# Patient Record
Sex: FEMALE | Race: WHITE | NOT HISPANIC OR LATINO | Employment: OTHER | URBAN - METROPOLITAN AREA
[De-identification: names, ages, dates, MRNs, and addresses within clinical notes are randomized per-mention and may not be internally consistent; named-entity substitution may affect disease eponyms.]

---

## 2017-01-04 ENCOUNTER — OUTSIDE FACILITY SERVICE (OUTPATIENT)
Dept: PAIN MEDICINE | Facility: CLINIC | Age: 47
End: 2017-01-04

## 2017-01-04 PROCEDURE — 64450 NJX AA&/STRD OTHER PN/BRANCH: CPT | Performed by: PAIN MEDICINE

## 2017-01-05 RX ORDER — TIZANIDINE 4 MG/1
TABLET ORAL
Qty: 270 TABLET | Refills: 0 | Status: SHIPPED | OUTPATIENT
Start: 2017-01-05 | End: 2017-02-10 | Stop reason: SDUPTHER

## 2017-01-07 ENCOUNTER — APPOINTMENT (OUTPATIENT)
Dept: GENERAL RADIOLOGY | Facility: HOSPITAL | Age: 47
End: 2017-01-07

## 2017-01-07 ENCOUNTER — HOSPITAL ENCOUNTER (INPATIENT)
Facility: HOSPITAL | Age: 47
LOS: 4 days | Discharge: HOME | End: 2017-01-11
Attending: FAMILY MEDICINE | Admitting: INTERNAL MEDICINE

## 2017-01-07 ENCOUNTER — APPOINTMENT (OUTPATIENT)
Dept: CT IMAGING | Facility: HOSPITAL | Age: 47
End: 2017-01-07

## 2017-01-07 DIAGNOSIS — I26.99 OTHER ACUTE PULMONARY EMBOLISM WITHOUT ACUTE COR PULMONALE (HCC): Primary | ICD-10-CM

## 2017-01-07 DIAGNOSIS — D50.9 IRON DEFICIENCY ANEMIA, UNSPECIFIED IRON DEFICIENCY ANEMIA TYPE: ICD-10-CM

## 2017-01-07 DIAGNOSIS — J18.9 PNEUMONIA OF BOTH LUNGS DUE TO INFECTIOUS ORGANISM, UNSPECIFIED PART OF LUNG: ICD-10-CM

## 2017-01-07 LAB
ALBUMIN SERPL-MCNC: 3.6 G/DL (ref 3.5–5.2)
ALBUMIN/GLOB SERPL: 1 G/DL
ALP SERPL-CCNC: 139 U/L (ref 39–117)
ALT SERPL W P-5'-P-CCNC: 26 U/L (ref 1–33)
ANION GAP SERPL CALCULATED.3IONS-SCNC: 15.7 MMOL/L
AST SERPL-CCNC: 21 U/L (ref 1–32)
BASOPHILS # BLD AUTO: 0.03 10*3/MM3 (ref 0–0.2)
BASOPHILS NFR BLD AUTO: 0.4 % (ref 0–1.5)
BILIRUB SERPL-MCNC: 0.3 MG/DL (ref 0.1–1.2)
BUN BLD-MCNC: 8 MG/DL (ref 6–20)
BUN/CREAT SERPL: 11 (ref 7–25)
CALCIUM SPEC-SCNC: 9.1 MG/DL (ref 8.6–10.5)
CHLORIDE SERPL-SCNC: 99 MMOL/L (ref 98–107)
CO2 SERPL-SCNC: 25.3 MMOL/L (ref 22–29)
CREAT BLD-MCNC: 0.73 MG/DL (ref 0.57–1)
D DIMER PPP FEU-MCNC: 1.4 MCGFEU/ML (ref 0–0.49)
DEPRECATED RDW RBC AUTO: 49 FL (ref 37–54)
EOSINOPHIL # BLD AUTO: 0.19 10*3/MM3 (ref 0–0.7)
EOSINOPHIL NFR BLD AUTO: 2.5 % (ref 0.3–6.2)
ERYTHROCYTE [DISTWIDTH] IN BLOOD BY AUTOMATED COUNT: 15.3 % (ref 11.7–13)
GFR SERPL CREATININE-BSD FRML MDRD: 85 ML/MIN/1.73
GLOBULIN UR ELPH-MCNC: 3.7 GM/DL
GLUCOSE BLD-MCNC: 102 MG/DL (ref 65–99)
HCG SERPL QL: NEGATIVE
HCT VFR BLD AUTO: 38.3 % (ref 35.6–45.5)
HGB BLD-MCNC: 12.6 G/DL (ref 11.9–15.5)
HOLD SPECIMEN: NORMAL
IMM GRANULOCYTES # BLD: 0 10*3/MM3 (ref 0–0.03)
IMM GRANULOCYTES NFR BLD: 0 % (ref 0–0.5)
LYMPHOCYTES # BLD AUTO: 1.51 10*3/MM3 (ref 0.9–4.8)
LYMPHOCYTES NFR BLD AUTO: 19.6 % (ref 19.6–45.3)
MCH RBC QN AUTO: 29.2 PG (ref 26.9–32)
MCHC RBC AUTO-ENTMCNC: 32.9 G/DL (ref 32.4–36.3)
MCV RBC AUTO: 88.9 FL (ref 80.5–98.2)
MONOCYTES # BLD AUTO: 0.34 10*3/MM3 (ref 0.2–1.2)
MONOCYTES NFR BLD AUTO: 4.4 % (ref 5–12)
NEUTROPHILS # BLD AUTO: 5.62 10*3/MM3 (ref 1.9–8.1)
NEUTROPHILS NFR BLD AUTO: 73.1 % (ref 42.7–76)
NT-PROBNP SERPL-MCNC: 931.2 PG/ML (ref 5–450)
PLATELET # BLD AUTO: 298 10*3/MM3 (ref 140–500)
PMV BLD AUTO: 9.6 FL (ref 6–12)
POTASSIUM BLD-SCNC: 4.1 MMOL/L (ref 3.5–5.2)
PROT SERPL-MCNC: 7.3 G/DL (ref 6–8.5)
RBC # BLD AUTO: 4.31 10*6/MM3 (ref 3.9–5.2)
SODIUM BLD-SCNC: 140 MMOL/L (ref 136–145)
TROPONIN T SERPL-MCNC: <0.01 NG/ML (ref 0–0.03)
WBC NRBC COR # BLD: 7.69 10*3/MM3 (ref 4.5–10.7)
WHOLE BLOOD HOLD SPECIMEN: NORMAL
WHOLE BLOOD HOLD SPECIMEN: NORMAL

## 2017-01-07 PROCEDURE — 83880 ASSAY OF NATRIURETIC PEPTIDE: CPT | Performed by: FAMILY MEDICINE

## 2017-01-07 PROCEDURE — 71275 CT ANGIOGRAPHY CHEST: CPT

## 2017-01-07 PROCEDURE — 93005 ELECTROCARDIOGRAM TRACING: CPT | Performed by: FAMILY MEDICINE

## 2017-01-07 PROCEDURE — 25010000002 LORAZEPAM PER 2 MG: Performed by: FAMILY MEDICINE

## 2017-01-07 PROCEDURE — 0 IOPAMIDOL PER 1 ML: Performed by: FAMILY MEDICINE

## 2017-01-07 PROCEDURE — 85379 FIBRIN DEGRADATION QUANT: CPT | Performed by: FAMILY MEDICINE

## 2017-01-07 PROCEDURE — 71020 HC CHEST PA AND LATERAL: CPT

## 2017-01-07 PROCEDURE — 80053 COMPREHEN METABOLIC PANEL: CPT | Performed by: FAMILY MEDICINE

## 2017-01-07 PROCEDURE — 25010000002 PROMETHAZINE PER 50 MG: Performed by: FAMILY MEDICINE

## 2017-01-07 PROCEDURE — 99285 EMERGENCY DEPT VISIT HI MDM: CPT

## 2017-01-07 PROCEDURE — 25010000002 HYDROMORPHONE PER 4 MG

## 2017-01-07 PROCEDURE — 85025 COMPLETE CBC W/AUTO DIFF WBC: CPT | Performed by: FAMILY MEDICINE

## 2017-01-07 PROCEDURE — 25010000002 ENOXAPARIN PER 10 MG: Performed by: FAMILY MEDICINE

## 2017-01-07 PROCEDURE — 93010 ELECTROCARDIOGRAM REPORT: CPT | Performed by: INTERNAL MEDICINE

## 2017-01-07 PROCEDURE — 25010000002 ONDANSETRON PER 1 MG

## 2017-01-07 PROCEDURE — 84484 ASSAY OF TROPONIN QUANT: CPT | Performed by: FAMILY MEDICINE

## 2017-01-07 PROCEDURE — 84703 CHORIONIC GONADOTROPIN ASSAY: CPT | Performed by: FAMILY MEDICINE

## 2017-01-07 RX ORDER — PROMETHAZINE HYDROCHLORIDE 25 MG/ML
12.5 INJECTION, SOLUTION INTRAMUSCULAR; INTRAVENOUS ONCE
Status: COMPLETED | OUTPATIENT
Start: 2017-01-07 | End: 2017-01-07

## 2017-01-07 RX ORDER — HYDROMORPHONE HYDROCHLORIDE 1 MG/ML
0.5 INJECTION, SOLUTION INTRAMUSCULAR; INTRAVENOUS; SUBCUTANEOUS ONCE
Status: COMPLETED | OUTPATIENT
Start: 2017-01-07 | End: 2017-01-07

## 2017-01-07 RX ORDER — ONDANSETRON 2 MG/ML
4 INJECTION INTRAMUSCULAR; INTRAVENOUS ONCE
Status: COMPLETED | OUTPATIENT
Start: 2017-01-07 | End: 2017-01-07

## 2017-01-07 RX ORDER — PROMETHAZINE HYDROCHLORIDE 25 MG/ML
6.25 INJECTION, SOLUTION INTRAMUSCULAR; INTRAVENOUS ONCE
Status: COMPLETED | OUTPATIENT
Start: 2017-01-07 | End: 2017-01-07

## 2017-01-07 RX ORDER — ONDANSETRON 2 MG/ML
INJECTION INTRAMUSCULAR; INTRAVENOUS
Status: COMPLETED
Start: 2017-01-07 | End: 2017-01-07

## 2017-01-07 RX ORDER — LORAZEPAM 2 MG/ML
1 INJECTION INTRAMUSCULAR ONCE
Status: COMPLETED | OUTPATIENT
Start: 2017-01-07 | End: 2017-01-07

## 2017-01-07 RX ORDER — SODIUM CHLORIDE 0.9 % (FLUSH) 0.9 %
10 SYRINGE (ML) INJECTION AS NEEDED
Status: DISCONTINUED | OUTPATIENT
Start: 2017-01-07 | End: 2017-01-11 | Stop reason: HOSPADM

## 2017-01-07 RX ORDER — PROMETHAZINE HYDROCHLORIDE 25 MG/ML
INJECTION, SOLUTION INTRAMUSCULAR; INTRAVENOUS
Status: DISPENSED
Start: 2017-01-07 | End: 2017-01-08

## 2017-01-07 RX ORDER — ALPRAZOLAM 0.25 MG/1
1 TABLET ORAL ONCE
Status: COMPLETED | OUTPATIENT
Start: 2017-01-07 | End: 2017-01-07

## 2017-01-07 RX ORDER — LORAZEPAM 2 MG/ML
1 INJECTION INTRAMUSCULAR ONCE
Status: DISCONTINUED | OUTPATIENT
Start: 2017-01-07 | End: 2017-01-07

## 2017-01-07 RX ADMIN — IOPAMIDOL 95 ML: 755 INJECTION, SOLUTION INTRAVENOUS at 20:54

## 2017-01-07 RX ADMIN — HYDROMORPHONE HYDROCHLORIDE 0.5 MG: 1 INJECTION, SOLUTION INTRAMUSCULAR; INTRAVENOUS; SUBCUTANEOUS at 23:53

## 2017-01-07 RX ADMIN — LORAZEPAM 1 MG: 2 INJECTION INTRAMUSCULAR; INTRAVENOUS at 19:49

## 2017-01-07 RX ADMIN — ALPRAZOLAM 1 MG: 0.25 TABLET ORAL at 17:53

## 2017-01-07 RX ADMIN — ONDANSETRON 4 MG: 2 INJECTION INTRAMUSCULAR; INTRAVENOUS at 23:53

## 2017-01-07 RX ADMIN — PROMETHAZINE HYDROCHLORIDE 12.5 MG: 25 INJECTION INTRAMUSCULAR; INTRAVENOUS at 18:00

## 2017-01-07 RX ADMIN — ENOXAPARIN SODIUM 160 MG: 80 INJECTION SUBCUTANEOUS at 23:55

## 2017-01-07 RX ADMIN — PROMETHAZINE HYDROCHLORIDE 6.25 MG: 25 INJECTION INTRAMUSCULAR; INTRAVENOUS at 20:24

## 2017-01-07 NOTE — IP AVS SNAPSHOT
AFTER VISIT SUMMARY             Radha Retana           About your hospitalization     You were admitted on:  January 7, 2017 You last received care in the:  [address]       Procedures & Surgeries         Medications    If you or your caregiver advised us that you are currently taking a medication and that medication is marked below as “Resume”, this simply indicates that we have reviewed those medications to make sure our new therapy recommendations do not interfere.  If you have concerns about medications other than those new ones which we are prescribing today, please consult the physician who prescribed them (or your primary physician).  Our review of your home medications is not meant to indicate that we are directing their use.             Your Medications      START taking these medications     albuterol 108 (90 BASE) MCG/ACT inhaler   Inhale 2 puffs Every 4 (Four) Hours As Needed for wheezing.   Commonly known as:  PROVENTIL HFA;VENTOLIN HFA           budesonide-formoterol 160-4.5 MCG/ACT inhaler   Inhale 2 puffs 2 (Two) Times a Day.   Last time this was given:  1/10/2017  7:59 PM   Commonly known as:  SYMBICORT           cyanocobalamin 500 MCG tablet   Take 1 tablet by mouth Daily.   Last time this was given:  1/11/2017 10:03 AM   Commonly known as:  VITAMIN B-12           dabigatran etexilate 150 MG capsu   Take 1 capsule by mouth Every 12 (Twelve) Hours.   Last time this was given:  1/11/2017 10:03 AM   Commonly known as:  PRADAXA            MG capsule   Take 100 mg by mouth 2 (Two) Times a Day.           oxyCODONE-acetaminophen 7.5-325 MG per tablet   Take 1 tablet by mouth Every 8 (Eight) Hours As Needed for moderate pain (4-6) or severe pain (7-10) for up to 8 days.   Last time this was given:  1/11/2017  5:55 AM   Commonly known as:  PERCOCET   Replaces:  oxyCODONE-acetaminophen 5-325 MG per tablet             CONTINUE taking these medications     amphetamine-dextroamphetamine 20 MG tablet   Commonly known as:  ADDERALL           cabergoline 0.5 MG tablet   Take 0.25 mg by mouth 2 (two) times a week.   Commonly known as:  DOSTINEX           clonazePAM 2 MG tablet   Take 2 mg by mouth 2 (Two) Times a Day As Needed.   Last time this was given:  1/11/2017 10:03 AM   Commonly known as:  KlonoPIN           gabapentin 600 MG tablet   TAKE 1 TABLET BY MOUTH THREE TIMES DAILY.   Last time this was given:  1/11/2017  5:55 AM   Commonly known as:  NEURONTIN           lisdexamfetamine 70 MG capsule   Take 70 mg by mouth every morning.   Commonly known as:  VYVANSE           tiZANidine 4 MG tablet   TAKE 1 TABLET BY MOUTH THREE TIMES DAILY   Last time this was given:  1/11/2017  5:55 AM   Commonly known as:  ZANAFLEX           vilazodone 40 MG tablet tablet   Take by mouth daily.   Last time this was given:  1/11/2017 10:04 AM   Commonly known as:  VIIBRYD           ziprasidone 60 MG capsule   60 mg 2 (Two) Times a Day.   Last time this was given:  1/10/2017  9:09 PM   Commonly known as:  GEODON             STOP taking these medications     ibuprofen 200 MG tablet   Commonly known as:  ADVIL,MOTRIN           naproxen sodium 220 MG tablet   Commonly known as:  ALEVE           oxyCODONE-acetaminophen 5-325 MG per tablet   Commonly known as:  PERCOCET   Replaced by:  oxyCODONE-acetaminophen 7.5-325 MG per tablet           VICTOZA 18 MG/3ML solution pen-injector   Generic drug:  Liraglutide                Where to Get Your Medications      These medications were sent to [facility] [id number] [place] [address] - [address] [facility] - [phone] [facility] [phone]   [address] [facility], [facility] [address]     Phone:  [phone]     albuterol 108 (90 BASE) MCG/ACT inhaler    budesonide-formoterol 160-4.5 MCG/ACT inhaler    cyanocobalamin 500 MCG tablet    dabigatran etexilate 150 MG capsu     MG capsule         You can get  these medications from any pharmacy     Bring a paper prescription for each of these medications     oxyCODONE-acetaminophen 7.5-325 MG per tablet                  Your Medications      Your Medication List           Morning Noon Evening Bedtime As Needed    albuterol 108 (90 BASE) MCG/ACT inhaler   Inhale 2 puffs Every 4 (Four) Hours As Needed for wheezing.   Commonly known as:  PROVENTIL HFA;VENTOLIN HFA                            Take every 4 hours as needed for shortness of air / wheezing       amphetamine-dextroamphetamine 20 MG tablet   Commonly known as:  ADDERALL                                   budesonide-formoterol 160-4.5 MCG/ACT inhaler   Inhale 2 puffs 2 (Two) Times a Day.   Commonly known as:  SYMBICORT                                      cabergoline 0.5 MG tablet   Take 0.25 mg by mouth 2 (two) times a week.   Commonly known as:  DOSTINEX                                   clonazePAM 2 MG tablet   Take 2 mg by mouth 2 (Two) Times a Day As Needed.   Commonly known as:  KlonoPIN                            Take twice a day as needed for anxiety       cyanocobalamin 500 MCG tablet   Take 1 tablet by mouth Daily.   Commonly known as:  VITAMIN B-12                                   dabigatran etexilate 150 MG capsu   Take 1 capsule by mouth Every 12 (Twelve) Hours.   Commonly known as:  PRADAXA                                       MG capsule   Take 100 mg by mouth 2 (Two) Times a Day.                                      gabapentin 600 MG tablet   TAKE 1 TABLET BY MOUTH THREE TIMES DAILY.   Commonly known as:  NEURONTIN                                         lisdexamfetamine 70 MG capsule   Take 70 mg by mouth every morning.   Commonly known as:  VYVANSE                                   oxyCODONE-acetaminophen 7.5-325 MG per tablet   Take 1 tablet by mouth Every 8 (Eight) Hours As Needed for moderate pain (4-6) or severe pain (7-10) for up to 8 days.   Commonly known as:  PERCOCET                             Take every 8 hours as needed for pain         tiZANidine 4 MG tablet   TAKE 1 TABLET BY MOUTH THREE TIMES DAILY   Commonly known as:  ZANAFLEX                                         vilazodone 40 MG tablet tablet   Take by mouth daily.   Commonly known as:  VIIBRYD                                   ziprasidone 60 MG capsule   60 mg 2 (Two) Times a Day.   Commonly known as:  GEODON                                               Instructions for After Discharge          Discharge Instructions       • Dr. Mccloud: See him in the office in 2-3 weeks to review her laboratory parameters and make any recommendations. In the future when she returns to the office, I am planning as well to proceed with a repeat CT scan of the chest to be sure that the clots are very much resolved.    Discharge References/Attachments     PULMONARY EMBOLISM (ENGLISH)    ALBUTEROL INHALATION SOLUTION (ENGLISH)    BUDESONIDE; FORMOTEROL INHALATION (ENGLISH)    VITAMIN B12 ORAL (ENGLISH)    DABIGATRAN ORAL CAPSULES (ENGLISH)    ACETAMINOPHEN; OXYCODONE TABLETS (ENGLISH)       Follow-ups for After Discharge        Follow-up Information     Follow up with Dilma Landeros MD. Schedule an appointment as soon as possible for a visit in 1 week(s).    Specialty:  Family Medicine    Why:  made appt for 1/19/17 at 2:15pm    Contact information:    [email or web address]  [address]  [place] [id number] [phone]          Follow up with Harlan Mccloud MD. Go on 1/31/2017.    Specialties:  Hematology and Oncology, Oncology, Hematology    Why:  1/31 @ 230  Office in 2-3 wks to review her laboratory parameters & make any recommendations.      In the future when she returns to the office, I am planning as well to proceed with a repeat CT scan     Contact information:    [address]  [address]  [place] [id number] [phone]          Follow up with Claudette Larson MD. Schedule an appointment as soon as possible for a visit in 2 week(s).     Specialties:  Pulmonary Disease, Sleep Medicine    Why:  Make an appointment to have home sleep study arranged.    Contact information:    [address]  [facility] [address]  [facility] [id number] [phone]        Scheduled Appointments     Jan 19, 2017  2:30 PM EST   Office Visit with Dilma Landeros MD   [facility] (--)    [address]  [facility] [address] [phone]           Arrive 15 minutes prior to appointment.            Jan 31, 2017  2:30 PM EST   Lab with LAB CHAIR 2 [facility] ONC LAB (--)    [address] [facility]  [address]  [facility] [id number] [phone]            Jan 31, 2017  3:00 PM EST   HOSITAL FOLLOW UP with Harlan Mccloud MD   [facility]ORDERS [facility]CER ([facility])    [address] [facility] [address]  [facility] [address] [phone]              MyChart Signup     Our records indicate that you have declined [facility] MyChart signup. If you would like to sign up for [facility], please email Server [email or web address] or call [phone] to obtain an activation code.         Summary of Your Hospitalization        Reason for Hospitalization     Your primary diagnosis was:  Pulmonary Embolism Without Acute Cor Pulmonale    Your diagnoses also included:  Anxiety Problem, Depression, Severe Obesity, Pituitary Adenoma, Vitamin B12 Deficiency, Iron Deficiency Anemia      Care Providers     Provider Service Role Specialty    Devendra Lucero MD -- Attending Provider Internal Medicine    Kyle Meyers II, MD -- Consulting Physician  Hematology and Oncology    Deonte Rivera MD -- Consulting Physician  Pulmonary Disease    Bekah Valdovinos MD PhD -- Consulting Physician  Hematology and Oncology       Your Allergies  Date Reviewed: 1/8/2017    Allergen Reactions    Adhesive Tape Not Noted         Penicillins Not Noted         Sulfa Antibiotics Not  Noted         Morphine Hives  Rash      Pending Labs     Order Current Status    Beta-2 Glycoprotein Antibodies In process    Factor 5 Leiden In process    Factor II, DNA Analysis In process    Lupus Anticoagulant In process    Blood Culture Preliminary result    Blood Culture Preliminary result      Patient Belongings Returned     Document Return of Belongings Flowsheet     Were the patient bedside belongings sent home?   Yes   Belongings Retrieved from Security & Sent Home   N/A    Belongings Sent to Safe   --   Medications Retrieved from Pharmacy & Sent Home   N/A              More Information      Pulmonary Embolism  A pulmonary embolism (PE) is a sudden blockage or decrease of blood flow in one lung or both lungs. Most blockages come from a blood clot that travels from the legs or the pelvis to the lungs. PE is a dangerous and potentially life-threatening condition if it is not treated right away.  CAUSES  A pulmonary embolism occurs most commonly when a blood clot travels from one of your veins to your lungs. Rarely, PE is caused by air, fat, amniotic fluid, or part of a tumor traveling through your veins to your lungs.  RISK FACTORS  A PE is more likely to develop in:  · People who smoke.  · People who are older, especially over 60 years of age.  · People who are overweight (obese).  · People who sit or lie still for a long time, such as during long-distance travel (over 4 hours), bed rest, hospitalization, or during recovery from certain medical conditions like a stroke.  · People who do not engage in much physical activity (sedentary lifestyle).  · People who have chronic breathing disorders.  · People who have a personal or family history of blood clots or blood clotting disease.  · People who have peripheral vascular disease (PVD), diabetes, or some types of cancer.  · People who have heart disease, especially if the person had a recent heart attack or has congestive heart failure.  · People who have  neurological diseases that affect the legs (leg paresis).  · People who have had a traumatic injury, such as breaking a hip or leg.  · People who have recently had major or lengthy surgery, especially on the hip, knee, or abdomen.  · People who have had a central line placed inside a large vein.  · People who take medicines that contain the hormone estrogen. These include birth control pills and hormone replacement therapy.  · Pregnancy or during childbirth or the postpartum period.  SIGNS AND SYMPTOMS   The symptoms of a PE usually start suddenly and include:  · Shortness of breath while active or at rest.  · Coughing or coughing up blood or blood-tinged mucus.  · Chest pain that is often worse with deep breaths.  · Rapid or irregular heartbeat.  · Feeling light-headed or dizzy.  · Fainting.  · Feeling anxious.  · Sweating.  There may also be pain and swelling in a leg if that is where the blood clot started.  These symptoms may represent a serious problem that is an emergency. Do not wait to see if the symptoms will go away. Get medical help right away. Call your local emergency services (911 in the [place] Do not drive yourself to the hospital.  DIAGNOSIS  Your health care provider will take a medical history and perform a physical exam. You may also have other tests, including:  · Blood tests to assess the clotting properties of your blood, assess oxygen levels in your blood, and find blood clots.  · Imaging tests, such as CT, ultrasound, MRI, X-ray, and other tests to see if you have clots anywhere in your body.  · An electrocardiogram (ECG) to look for heart strain from blood clots in the lungs.  TREATMENT  The main goals of PE treatment are:  · To stop a blood clot from growing larger.  · To stop new blood clots from forming.  The type of treatment that you receive depends on many factors, such as the cause of your PE, your risk for bleeding or developing more clots, and other medical conditions that you have.  Sometimes, a combination of treatments is necessary.  This condition may be treated with:  · Medicines, including newer oral blood thinners (anticoagulants), warfarin, low molecular weight heparins, thrombolytics, or heparins.  · Wearing compression stockings or using different types of devices.  · Surgery (rare) to remove the blood clot or to place a filter in your abdomen to stop the blood clot from traveling to your lungs.  Treatments for a PE are often divided into immediate treatment, long-term treatment (up to 3 months after PE), and extended treatment (more than 3 months after PE). Your treatment may continue for several months. This is called maintenance therapy, and it is used to prevent the forming of new blood clots. You can work with your health care provider to choose the treatment program that is best for you.  What are anticoagulants?   Anticoagulants are medicines that treat PEs. They can stop current blood clots from growing and stop new clots from forming. They cannot dissolve existing clots. Your body dissolves clots by itself over time. Anticoagulants are given by mouth, by injection, or through an IV tube.  What are thrombolytics?   Thrombolytics are clot-dissolving medicines that are used to dissolve a PE. They carry a high risk of bleeding, so they tend to be used only in severe cases or if you have very low blood pressure.  HOME CARE INSTRUCTIONS  If you are taking a newer oral anticoagulant:   · Take the medicine every single day at the same time each day.  · Understand what foods and drugs interact with this medicine.  · Understand that there are no regular blood tests required when using this medicine.  · Understand the side effects of this medicine, including excessive bruising or bleeding. Ask your health care provider or pharmacist about other possible side effects.  If you are taking warfarin:  · Understand how to take warfarin and know which foods can affect how warfarin works in your  body.  · Understand that it is dangerous to take too much or too little warfarin. Too much warfarin increases the risk of bleeding. Too little warfarin continues to allow the risk for blood clots.  · Follow your PT and INR blood testing schedule. The PT and INR results allow your health care provider to adjust your dose of warfarin. It is very important that you have your PT and INR tested as often as told by your health care provider.  · Avoid major changes in your diet, or tell your health care provider before you change your diet. Arrange a visit with a registered dietitian to answer your questions. Many foods, especially foods that are high in vitamin K, can interfere with warfarin and affect the PT and INR results. Eat a consistent amount of foods that are high in vitamin K, such as:    Spinach, kale, broccoli, cabbage, kadie greens, turnip greens, [place] sprouts, peas, cauliflower, seaweed, and parsley.    Beef liver and pork liver.    Green tea.    Soybean oil.  · Tell your health care provider about any and all medicines, vitamins, and supplements that you take, including aspirin and other over-the-counter anti-inflammatory medicines. Be especially cautious with aspirin and anti-inflammatory medicines. Do not take those before you ask your health care provider if it is safe to do so.  This is important because many medicines can interfere with warfarin and affect the PT and INR results.  · Do not start or stop taking any over-the-counter or prescription medicine unless your health care provider or pharmacist tells you to do so.  If you take warfarin, you will also need to do these things:  · Hold pressure over cuts for longer than usual.  · Tell your dentist and other health care providers that you are taking warfarin before you have any procedures in which bleeding may occur.  · Avoid alcohol or drink very small amounts. Tell your health care provider if you change your alcohol intake.  · Do not use  tobacco products, including cigarettes, chewing tobacco, and e-cigarettes. If you need help quitting, ask your health care provider.  · Avoid contact sports.  General Instructions  · Take over-the-counter and prescription medicines only as told by your health care provider. Anticoagulant medicines can have side effects, including easy bruising and difficulty stopping bleeding. If you are prescribed an anticoagulant, you will also need to do these things:    Hold pressure over cuts for longer than usual.    Tell your dentist and other health care providers that you are taking anticoagulants before you have any procedures in which bleeding may occur.    Avoid contact sports.  · Wear a medical alert bracelet or carry a medical alert card that says you have had a PE.  · Ask your health care provider how soon you can go back to your normal activities. Stay active to prevent new blood clots from forming.  · Make sure to exercise while traveling or when you have been sitting or standing for a long period of time. It is very important to exercise. Exercise your legs by walking or by tightening and relaxing your leg muscles often. Take frequent walks.  · Wear compression stockings as told by your health care provider to help prevent more blood clots from forming.  · Do not use tobacco products, including cigarettes, chewing tobacco, and e-cigarettes. If you need help quitting, ask your health care provider.  · Keep all follow-up appointments with your health care provider. This is important.  PREVENTION  Take these actions to decrease your risk of developing another PE:  · Exercise regularly. For at least 30 minutes every day, engage in:    Activity that involves moving your arms and legs.    Activity that encourages good blood flow through your body by increasing your heart rate.  · Exercise your arms and legs every hour during long-distance travel (over 4 hours). Drink plenty of water and avoid drinking alcohol while  traveling.  · Avoid sitting or lying in bed for long periods of time without moving your legs.  · Maintain a weight that is appropriate for your height. Ask your health care provider what weight is healthy for you.  · If you are a woman who is over 35 years of age, avoid unnecessary use of medicines that contain estrogen. These include birth control pills.  · Do not smoke, especially if you take estrogen medicines.  If you need help quitting, ask your health care provider.  · If you are at very high risk for PE, wear compression stockings.  · If you recently had a PE, have regularly scheduled ultrasound testing on your legs to check for new blood clots.  If you are hospitalized, prevention measures may include:  · Early walking after surgery, as soon as your health care provider says that it is safe.  · Receiving anticoagulants to prevent blood clots. If you cannot take anticoagulants, other options may be available, such as wearing compression stockings or using different types of devices.  SEEK IMMEDIATE MEDICAL CARE IF:  · You have new or increased pain, swelling, or redness in an arm or leg.  · You have numbness or tingling in an arm or leg.  · You have shortness of breath while active or at rest.  · You have chest pain.  · You have a rapid or irregular heartbeat.  · You feel light-headed or dizzy.  · You cough up blood.  · You notice blood in your vomit, bowel movement, or urine.  · You have a fever.  These symptoms may represent a serious problem that is an emergency. Do not wait to see if the symptoms will go away. Get medical help right away. Call your local emergency services (911 in the [place] Do not drive yourself to the hospital.     This information is not intended to replace advice given to you by your health care provider. Make sure you discuss any questions you have with your health care provider.     Document Released: 12/15/2001 Document Revised: 09/07/2016 Document Reviewed: 04/13/2016  [employer]  Interactive Patient Education ©2016 [employer]          Albuterol inhalation solution  What is this medicine?  ALBUTEROL (al BYOO ter ole) is a bronchodilator. It helps to open up the airways in your lungs to make it easier to breathe. This medicine is used to treat and to prevent bronchospasm.  This medicine may be used for other purposes; ask your health care provider or pharmacist if you have questions.  What should I tell my health care provider before I take this medicine?  They need to know if you have any of the following conditions:  -diabetes  -heart disease or irregular heartbeat  -high blood pressure  -pheochromocytoma  -seizures  -thyroid disease  -an unusual or allergic reaction to albuterol, levalbuterol, sulfites, other medicines, foods, dyes, or preservatives  -pregnant or trying to get pregnant  -breast-feeding  How should I use this medicine?  This medicine is used in a nebulizer. Nebulizers make a liquid into an aerosol that you breathe in through your mouth or your mouth and nose into your lungs. You will be taught how to use your nebulizer. Follow the directions on your prescription label. Take your medicine at regular intervals. Do not use more often than directed.  Talk to your pediatrician regarding the use of this medicine in children. Special care may be needed.  Overdosage: If you think you have taken too much of this medicine contact a poison control center or emergency room at once.  NOTE: This medicine is only for you. Do not share this medicine with others.  What if I miss a dose?  If you miss a dose, use it as soon as you can. If it is almost time for your next dose, use only that dose. Do not use double or extra doses.  What may interact with this medicine?  -anti-infectives like chloroquine and pentamidine  -caffeine  -cisapride  -diuretics  -medicines for colds  -medicines for depression or emotional or psychotic conditions  -medicines for weight loss including some herbal  products  -methadone  -some antibiotics like clarithromycin, erythromycin, levofloxacin, and linezolid  -some heart medicines  -steroid hormones like dexamethasone, cortisone, hydrocortisone  -theophylline  -thyroid hormones  This list may not describe all possible interactions. Give your health care provider a list of all the medicines, herbs, non-prescription drugs, or dietary supplements you use. Also tell them if you smoke, drink alcohol, or use illegal drugs. Some items may interact with your medicine.  What should I watch for while using this medicine?  Tell your doctor or health care professional if your symptoms do not improve. Do not use extra albuterol. Call your doctor right away if your asthma or bronchitis gets worse while you are using this medicine.  If your mouth gets dry try chewing sugarless gum or sucking hard candy. Drink water as directed.  What side effects may I notice from receiving this medicine?  Side effects that you should report to your doctor or health care professional as soon as possible:  -allergic reactions like skin rash, itching or hives, swelling of the face, lips, or tongue  -breathing problems  -chest pain  -feeling faint or lightheaded, falls  -high blood pressure  -irregular heartbeat  -fever  -muscle cramps or weakness  -pain, tingling, numbness in the hands or feet  -vomiting  Side effects that usually do not require medical attention (report to your doctor or health care professional if they continue or are bothersome):  -cough  -difficulty sleeping  -headache  -nervousness, trembling  -stomach upset  -stuffy or runny nose  -throat irritation  -unusual taste  This list may not describe all possible side effects. Call your doctor for medical advice about side effects. You may report side effects to FDA at 9-741-FDA-5975.  Where should I keep my medicine?  Keep out of the reach of children.  Store between 2 and 25 degrees C (36 and 77 degrees F). Do not freeze. Protect from  light. Throw away any unused medicine after the expiration date. Most products are kept in the foil package until time of use. Some products can be used up to 1 week after they are removed from the foil pouch. Check the instructions that come with your medicine.  NOTE: This sheet is a summary. It may not cover all possible information. If you have questions about this medicine, talk to your doctor, pharmacist, or health care provider.     © 2016, [employer]. (2012-09-07 15:19:55)          Budesonide; Formoterol Inhalation  What is this medicine?  BUDESONIDE; FORMOTEROL (byoo ROMIE oh nide; for MOH te annette) inhalation is a combination of two medicines that decrease inflammation and help to open up the airways in your lungs. It is used to treat asthma. Do NOT use for an acute asthma attack.  This medicine may be used for other purposes; ask your health care provider or pharmacist if you have questions.  What should I tell my health care provider before I take this medicine?  They need to know if you have any of these conditions:  -bone problems  -diabetes  -heart disease or irregular heartbeat  -high blood pressure  -immune system problems  -infection  -liver disease  -worsening asthma  -an unusual or allergic reaction to budesonide, formoterol, medicines, foods, dyes, or preservatives  -pregnant or trying to get pregnant  -breast-feeding  How should I use this medicine?  This medicine is inhaled through the mouth. Rinse your mouth with water after use. Make sure not to swallow the water. Follow the directions on your prescription label. Do not use more often than directed. Do not stop taking except on your doctor's advice. Make sure that you are using your inhaler correctly. Ask your doctor or health care provider if you have any questions.  A special MedGuide will be given to you by the pharmacist with each prescription and refill. Be sure to read this information carefully each time.  Talk to your  pediatrician regarding the use of this medicine in children. While this drug may be prescribed for children as young as 12 years of age for selected conditions, precautions do apply.  Overdosage: If you think you have taken too much of this medicine contact a poison control center or emergency room at once.  NOTE: This medicine is only for you. Do not share this medicine with others.  What if I miss a dose?  If you miss a dose, use it as soon as you remember. If it is almost time for your next dose, use only that dose and continue with your regular schedule, spacing doses evenly. Do not use double or extra doses.  What may interact with this medicine?  Do not take this medicine with any of the following medications:  -MAOIs like Carbex, Eldepryl, Marplan, Nardil, and Parnate  -mifepristone  -probucol  -procarbazine  -some other medicines for asthma like formoterol, salmeterol  This medicine may also interact with the following medications:  -antibiotics like clarithromycin, erythromycin  -cimetidine  -diuretics  -grapefruit juice  -itraconazole  -ketoconazole  -medicines for depression, anxiety, or psychotic disturbances  -medicines for irregular heartbeat  -methadone  -some heart medicines like atenolol, metoprolol  -some other medicines for breathing problems  -some vaccines  This list may not describe all possible interactions. Give your health care provider a list of all the medicines, herbs, non-prescription drugs, or dietary supplements you use. Also tell them if you smoke, drink alcohol, or use illegal drugs. Some items may interact with your medicine.  What should I watch for while using this medicine?  Tell your doctor or health care professional if your symptoms do not improve or get worse. If you need to use your short-acting inhalers more often, call your doctor right away. Do not use more than every 12 hours.  If you have asthma, be aware that using this medicine may increase your risk of dying from  asthma related problems. Talk to your doctor about the risks and benefits of taking this medicine. NEVER use this medicine for an acute asthma attack.  This medicine may increase your risk of getting an infection. Tell your doctor or health care professional if you are around anyone with measles or chickenpox, or if you develop sores or blisters that do not heal properly.  What side effects may I notice from receiving this medicine?  Side effects that you should report to your doctor or health care professional as soon as possible:  -allergic reactions such as skin rash or itching, hives, swelling of the face, lips or tongue  -breathing problems  -changes in vision  -chest pain  -fast, irregular heartbeat  -feeling faint or lightheaded, falls  -fever  -high blood pressure  -nervousness  -tremors  -white patches or sores in mouth  Side effects that usually do not require medical attention (report to your doctor or health care professional if they continue or are bothersome):  -cough  -different taste in mouth  -headache  -sore throat  -stuffy nose  -stomach upset  This list may not describe all possible side effects. Call your doctor for medical advice about side effects. You may report side effects to FDA at 9-805-FDA-6962.  Where should I keep my medicine?  Keep out of the reach of children.  Store in a dry place at room temperature between 20 and 25 degrees C (68 and 77 degrees F). Do not get the inhaler wet. Keep track of the number of doses used. Throw away the inhaler after using the marked number of inhalations or after the expiration date, whichever comes first. Do not burn or puncture canister.  NOTE: This sheet is a summary. It may not cover all possible information. If you have questions about this medicine, talk to your doctor, pharmacist, or health care provider.     © 2016, [employer]. (2014-04-24 16:01:23)          Vitamin B12 oral  What is this medicine?  CYANOCOBALAMIN (sye an oh koe BAL  a min) is a man made form of vitamin B12. Vitamin B12 is essential in the development of healthy blood cells, nerve cells, and proteins in the body. It also helps with the metabolism of fats and carbohydrates. It is added to a healthy diet to prevent or treat low vitamin B-12 levels.  This medicine may be used for other purposes; ask your health care provider or pharmacist if you have questions.  What should I tell my health care provider before I take this medicine?  They need to know if you have any of these conditions:  -anemia  -kidney disease  -Jeffrey's disease  -malabsorption disorder  -an unusual or allergic reaction to cyanocobalamin, cobalt, other medicines, foods, dyes, or preservatives  -pregnant or trying to get pregnant  -breast-feeding  How should I use this medicine?  Take this medicine by mouth with a glass of water. Follow the directions on the package or prescription label. If you are taking the tablets, do not chew, cut, or crush this medicine. If using an vitamin solution, use a specially marked spoon or dropper to measure each dose. Ask your pharmacist if you do not have one. Household spoons are not accurate. For best results take this vitamin with food. Take your medicine at regular intervals. Do not take your medicine more often than directed.  Talk to your pediatrician regarding the use of this medicine in children. While this drug may be prescribed for selected conditions, precautions do apply.  Overdosage: If you think you have taken too much of this medicine contact a poison control center or emergency room at once.  NOTE: This medicine is only for you. Do not share this medicine with others.  What if I miss a dose?  If you miss a dose, take it as soon as you can. If it is almost time for your next dose, take only that dose. Do not take double or extra doses.  What may interact with this medicine?  -alcohol  -aminosalicylic acid  -colchicine  -medicines that suppress your bone marrow like  chemotherapy, chloramphenicol  This list may not describe all possible interactions. Give your health care provider a list of all the medicines, herbs, non-prescription drugs, or dietary supplements you use. Also tell them if you smoke, drink alcohol, or use illegal drugs. Some items may interact with your medicine.  What should I watch for while using this medicine?  Follow a healthy diet. Taking a vitamin supplement does not replace the need for a balanced diet. Some foods that have vitamin B-12 naturally are fish, seafood, egg yolk, milk and fermented cheese.  Too much of this vitamin can be unsafe. Talk to your doctor or health care provider about how much is right for you.  What side effects may I notice from receiving this medicine?  Side effects that you should report to your doctor or health care professional as soon as possible:  -allergic reactions like skin rash, itching or hives, swelling of the face, lips, or tongue  -breathing problems  -chest pain, tightness  Side effects that usually do not require medical attention (report to your doctor or health care professional if they continue or are bothersome):  -diarrhea  This list may not describe all possible side effects. Call your doctor for medical advice about side effects. You may report side effects to FDA at 6-785-FDA-7214.  Where should I keep my medicine?  Keep out of the reach of children.  Store at room temperature between 15 and 30 degrees C (59 and 85 degrees F). Protect from heat and light. Throw away any unused medicine after the expiration date.  NOTE: This sheet is a summary. It may not cover all possible information. If you have questions about this medicine, talk to your doctor, pharmacist, or health care provider.     © 2016, [employer]. (2013-05-21 07:58:17)          Dabigatran oral capsules  What is this medicine?  DABIGATRAN (DA bi GAT ran) is an anticoagulant (blood thinner). It is used to lower the chance of stroke in  people with a medical condition called atrial fibrillation. It is also used to treat or prevent blood clots in the lungs or in the veins.  This medicine may be used for other purposes; ask your health care provider or pharmacist if you have questions.  What should I tell my health care provider before I take this medicine?  They need to know if you have any of these conditions:  -bleeding disorders  -history of stomach bleeding  -mechanical heart valve  -kidney disease  -recent or planned spinal or epidural procedure  -an allergic reaction to dabigatran, other medicines, foods, dyes, or preservatives  -pregnant or trying to get pregnant  -breast-feeding  How should I use this medicine?  Take this medicine by mouth with a full glass of water. Follow the directions on the prescription label. Swallow the capsules whole. Do not break, chew, or empty the contents from the capsule. You can take it with or without food. If it upsets your stomach, take it with food. Take your medicine at regular intervals. Do not take it more often than directed. Do not stop taking except on your doctor's advice. Stopping this medicine may increase your risk of a blot clot. Be sure to refill your prescription before you run out of medicine.  A special MedGuide will be given to you by the pharmacist with each prescription and refill. Be sure to read this information carefully each time.  Talk to your pediatrician regarding the use of this medicine in children. Special care may be needed.  Overdosage: If you think you have taken too much of this medicine contact a poison control center or emergency room at once.  NOTE: This medicine is only for you. Do not share this medicine with others.  What if I miss a dose?  If you miss a dose, take it as soon as you can. If your next dose is less than 6 hours away, skip the missed dose. Do not take double or extra doses.  What may interact with this medicine?  Do not take this medicine with any of the  following medications:  -certain medicines that treat or prevent blood clots like warfarin, enoxaparin, and dalteparin  -mifepristone, RU-486This medicine may also interact with the following:  -carbamazepine  -certain medicines for depression  -dronedarone  -ketoconazole  -NSAIDs, medicines for pain and inflammation, like ibuprofen or naproxen  -quinidine  -rifampin  -tipranavir  This list may not describe all possible interactions. Give your health care provider a list of all the medicines, herbs, non-prescription drugs, or dietary supplements you use. Also tell them if you smoke, drink alcohol, or use illegal drugs. Some items may interact with your medicine.  What should I watch for while using this medicine?  Visit your doctor or health care professional for regular check ups.  Notify your doctor or health care professional and seek emergency treatment if you develop breathing problems; changes in vision; chest pain; severe, sudden headache; pain, swelling, warmth in the leg; trouble speaking; sudden numbness or weakness of the face, arm, or leg. These can be signs that your condition has gotten worse.  If you are going to have surgery, tell your doctor or health care professional that you are taking this medicine.  Tell your health care professional that you use this medicine before you have a spinal or epidural procedure. Sometimes people who take this medicine have bleeding problems around the spine when they have a spinal or epidural procedure. This bleeding is very rare. If you have a spinal or epidural procedure while on this medicine, call your health care professional immediately if you have back pain, numbness or tingling (especially in your legs and feet), muscle weakness, paralysis, or loss of bladder or bowel control.  Avoid sports and activities that might cause injury while you are using this medicine. Severe falls or injuries can cause unseen bleeding. Be careful when using sharp tools or knives.  Consider using an electric razor. Take special care brushing or flossing your teeth. Report any injuries, bruising, or red spots on the skin to your doctor or health care professional.  What side effects may I notice from receiving this medicine?  Side effects that you should report to your doctor or health care professional as soon as possible:  -allergic reactions like skin rash, itching or hives, swelling of the face, lips, or tongue  -feeling faint or lightheaded, falls  -signs and symptoms of bleeding such as bloody or black, tarry stools; red or dark-brown urine; spitting up blood or brown material that looks like coffee grounds; red spots on the skin; unusual bruising or bleeding from the eye, gums, or nose  Side effects that usually do not require medical attention (report these to your doctor or health care professional if they continue or are bothersome):  -stomach pain  -upset stomach  This list may not describe all possible side effects. Call your doctor for medical advice about side effects. You may report side effects to FDA at 5-127-FDA-9117.  Where should I keep my medicine?  Keep out of the reach of children.  Store at room temperature between 15 and 30 degrees C (59 and 86 degrees F). Keep capsules in the original container. Do not store or place capsules in any other container, such as pill boxes or pill organizers. After opening the bottle, use within 4 months. Throw away any unused medicine after 4 months.  NOTE: This sheet is a summary. It may not cover all possible information. If you have questions about this medicine, talk to your doctor, pharmacist, or health care provider.     © 2016, [employer]. (2014-08-16 22:28:33)          Acetaminophen; Oxycodone tablets  What is this medicine?  ACETAMINOPHEN; OXYCODONE (a set a GLENROY nadeem fen; ox i KOE done) is a pain reliever. It is used to treat moderate to severe pain.  This medicine may be used for other purposes; ask your health care  provider or pharmacist if you have questions.  What should I tell my health care provider before I take this medicine?  They need to know if you have any of these conditions:  -brain tumor  -Crohn's disease, inflammatory bowel disease, or ulcerative colitis  -drug abuse or addiction  -head injury  -heart or circulation problems  -if you often drink alcohol  -kidney disease or problems going to the bathroom  -liver disease  -lung disease, asthma, or breathing problems  -an unusual or allergic reaction to acetaminophen, oxycodone, other opioid analgesics, other medicines, foods, dyes, or preservatives  -pregnant or trying to get pregnant  -breast-feeding  How should I use this medicine?  Take this medicine by mouth with a full glass of water. Follow the directions on the prescription label. You can take it with or without food. If it upsets your stomach, take it with food. Take your medicine at regular intervals. Do not take it more often than directed.  Talk to your pediatrician regarding the use of this medicine in children. Special care may be needed.  Patients over 65 years old may have a stronger reaction and need a smaller dose.  Overdosage: If you think you have taken too much of this medicine contact a poison control center or emergency room at once.  NOTE: This medicine is only for you. Do not share this medicine with others.  What if I miss a dose?  If you miss a dose, take it as soon as you can. If it is almost time for your next dose, take only that dose. Do not take double or extra doses.  What may interact with this medicine?  -alcohol  -antihistamines  -barbiturates like amobarbital, butalbital, butabarbital, methohexital, pentobarbital, phenobarbital, thiopental, and secobarbital  -benztropine  -drugs for bladder problems like solifenacin, trospium, oxybutynin, tolterodine, hyoscyamine, and methscopolamine  -drugs for breathing problems like ipratropium and tiotropium  -drugs for certain stomach or  intestine problems like propantheline, homatropine methylbromide, glycopyrrolate, atropine, belladonna, and dicyclomine  -general anesthetics like etomidate, ketamine, nitrous oxide, propofol, desflurane, enflurane, halothane, isoflurane, and sevoflurane  -medicines for depression, anxiety, or psychotic disturbances  -medicines for sleep  -muscle relaxants  -naltrexone  -narcotic medicines (opiates) for pain  -phenothiazines like perphenazine, thioridazine, chlorpromazine, mesoridazine, fluphenazine, prochlorperazine, promazine, and trifluoperazine  -scopolamine  -tramadol  -trihexyphenidyl  This list may not describe all possible interactions. Give your health care provider a list of all the medicines, herbs, non-prescription drugs, or dietary supplements you use. Also tell them if you smoke, drink alcohol, or use illegal drugs. Some items may interact with your medicine.  What should I watch for while using this medicine?  Tell your doctor or health care professional if your pain does not go away, if it gets worse, or if you have new or a different type of pain. You may develop tolerance to the medicine. Tolerance means that you will need a higher dose of the medication for pain relief. Tolerance is normal and is expected if you take this medicine for a long time.  Do not suddenly stop taking your medicine because you may develop a severe reaction. Your body becomes used to the medicine. This does NOT mean you are addicted. Addiction is a behavior related to getting and using a drug for a non-medical reason. If you have pain, you have a medical reason to take pain medicine. Your doctor will tell you how much medicine to take. If your doctor wants you to stop the medicine, the dose will be slowly lowered over time to avoid any side effects.  You may get drowsy or dizzy. Do not drive, use machinery, or do anything that needs mental alertness until you know how this medicine affects you. Do not stand or sit up  quickly, especially if you are an older patient. This reduces the risk of dizzy or fainting spells. Alcohol may interfere with the effect of this medicine. Avoid alcoholic drinks.  There are different types of narcotic medicines (opiates) for pain. If you take more than one type at the same time, you may have more side effects. Give your health care provider a list of all medicines you use. Your doctor will tell you how much medicine to take. Do not take more medicine than directed. Call emergency for help if you have problems breathing.  The medicine will cause constipation. Try to have a bowel movement at least every 2 to 3 days. If you do not have a bowel movement for 3 days, call your doctor or health care professional.  Do not take Tylenol (acetaminophen) or medicines that have acetaminophen with this medicine. Too much acetaminophen can be very dangerous. Many nonprescription medicines contain acetaminophen. Always read the labels carefully to avoid taking more acetaminophen.  What side effects may I notice from receiving this medicine?  Side effects that you should report to your doctor or health care professional as soon as possible:  -allergic reactions like skin rash, itching or hives, swelling of the face, lips, or tongue  -breathing difficulties, wheezing  -confusion  -light headedness or fainting spells  -severe stomach pain  -unusually weak or tired  -yellowing of the skin or the whites of the eyes  Side effects that usually do not require medical attention (report to your doctor or health care professional if they continue or are bothersome):  -dizziness  -drowsiness  -nausea  -vomiting  This list may not describe all possible side effects. Call your doctor for medical advice about side effects. You may report side effects to FDA at 3-553-FDA-3172.  Where should I keep my medicine?  Keep out of the reach of children. This medicine can be abused. Keep your medicine in a safe place to protect it from  theft. Do not share this medicine with anyone. Selling or giving away this medicine is dangerous and against the law.  This medicine may cause accidental overdose and death if it taken by other adults, children, or pets. Mix any unused medicine with a substance like cat litter or coffee grounds. Then throw the medicine away in a sealed container like a sealed bag or a coffee can with a lid. Do not use the medicine after the expiration date.  Store at room temperature between 20 and 25 degrees C (68 and 77 degrees F).  NOTE: This sheet is a summary. It may not cover all possible information. If you have questions about this medicine, talk to your doctor, pharmacist, or health care provider.     © 2016, [employer]. (2015-11-18 15:18:46)            SYMPTOMS OF A STROKE    Call 911 or have someone take you to the Emergency Department if you have any of the following:    · Sudden numbness or weakness of your face, arm or leg especially on one side of the body  · Sudden confusion, diffiiculty speaking or trouble understanding   · Changes in your vision or loss of sight in one eye  · Sudden severe headache with no known cause  · sudden dizziness, trouble walking, loss of balance or coordination    It is important to seek emergency care right away if you have further stroke symptoms. If you get emergency help quickly, the powerful clot-dissolving medicines can reduce the disabilities caused by a stroke.     For more information:    American Stroke Association  7-526-9-STROKE  www.strokeassociation.org           IF YOU SMOKE OR USE TOBACCO PLEASE READ THE FOLLOWING:    Why is smoking bad for me?  Smoking increases the risk of heart disease, lung disease, vascular disease, stroke, and cancer.     If you smoke, STOP!    If you would like more information on quitting smoking, please visit the [employer] website: [email or web address]   This link will provide additional  resources including the QUIT line and the Beat the Pack support groups.     For more information:    American Cancer Society  ([phone] 506-4677    American Heart Association  1-512.920.3828               YOU ARE THE MOST IMPORTANT FACTOR IN YOUR RECOVERY.     Follow all instructions carefully.     I have reviewed my discharge instructions with my nurse, including the following information, if applicable:     Information about my illness and diagnosis   Follow up appointments (including lab draws)   Wound Care   Equipment Needs   Medications (new and continuing) along with side effects   Preventative information such as vaccines and smoking cessations   Diet   Pain   I know when to contact my Doctor's office or seek emergency care      I want my nurse to describe the side effects of my medications: YES NO   If the answer is no, I understand the side effects of my medications: YES NO   My nurse described the side effects of my medications in a way that I could understand: YES NO   I have taken my personal belongings and my own medications with me at discharge: YES NO            I have received this information and my questions have been answered. I have discussed any concerns I see with this plan with the nurse or physician. I understand these instructions.    Signature of Patient or Responsible Person: _____________________________________    Date: _________________  Time: __________________    Signature of Healthcare Provider: _______________________________________  Date: _________________  Time: __________________

## 2017-01-07 NOTE — ED PROVIDER NOTES
EMERGENCY DEPARTMENT ENCOUNTER    CHIEF COMPLAINT  Chief Complaint: SOB  History given by: patient and friend  History limited by: nothing  Room Number: 11/11  PMD: Dilma Landeros MD      HPI:  Pt is a 46 y.o. female who presents complaining of SOB since this morning when she was woken up by her mother. Pt states that her mother is a nurse and woke pt up because of apparent apnea while she was sleeping in a chair. Pt states that she also suffers from anxiety and is bipolar. Pt states that she has taken all of her medication as directed today. Pt also complains of constant left parasternal CP, nausea, vomiting.  She denies recent long travel or history of DVT's.  She is quite sedentary due to hip/knee issues. Pt states that she had gastroenteritis one week ago. Pt has history of heart disease. Pt states that she has been diagnosed with a brain tumor (?pituitary)..    Duration: 8 hours   Onset: sudden  Timing: constant  Location: N/A  Radiation: N/A  Quality: N/A  Intensity/Severity: moderate  Progression: unchanged  Associated Symptoms: anxiety, CP, nausea, vomiting  Aggravating Factors: none stated  Alleviating Factors: none stated  Previous Episodes: Pt has history of anxiety  Treatment before arrival: pt reports taking all of her medications as directed    PAST MEDICAL HISTORY  Active Ambulatory Problems     Diagnosis Date Noted   • Anxiety 02/15/2016   • Depression 02/15/2016   • Knee pain 02/15/2016   • Low back pain 02/15/2016   • Psoriasis 02/15/2016   • Sciatica 02/15/2016   • Morbid obesity due to excess calories 02/17/2016   • Disorder of lactation 07/27/2016   • Pituitary adenoma 07/27/2016   • Chronic pain of both knees 12/29/2016     Resolved Ambulatory Problems     Diagnosis Date Noted   • No Resolved Ambulatory Problems     Past Medical History   Diagnosis Date   • Arthritis    • Bipolar depression    • Hyperprolinemia    • Migraine    • Neuromuscular disorder    • Obesity    • Pituitary tumor         PAST SURGICAL HISTORY  Past Surgical History   Procedure Laterality Date   • Bariatric surgery     •  section     • Back surgery     • Cholecystectomy     • Dilation and curettage, diagnostic / therapeutic         FAMILY HISTORY  Family History   Problem Relation Age of Onset   • Heart disease Mother    • Hypertension Mother    • Bipolar disorder Mother    • Heart disease Father    • Diabetes Father    • Alcohol abuse Brother    • Asthma Daughter    • Alcohol abuse Brother    • Bipolar disorder Brother        SOCIAL HISTORY  Social History     Social History   • Marital status:      Spouse name: N/A   • Number of children: N/A   • Years of education: N/A     Occupational History   • Not on file.     Social History Main Topics   • Smoking status: Former Smoker   • Smokeless tobacco: Never Used   • Alcohol use Yes      Comment: socially   • Drug use: No   • Sexual activity: Defer     Other Topics Concern   • Not on file     Social History Narrative   • No narrative on file       ALLERGIES  Adhesive tape; Penicillins; Sulfa antibiotics; and Morphine    REVIEW OF SYSTEMS  Review of Systems   Constitutional: Negative for chills, fever and unexpected weight change.   HENT: Negative for congestion, rhinorrhea and sore throat.    Respiratory: Negative for cough and shortness of breath.    Cardiovascular: Positive for chest pain. Negative for leg swelling.   Gastrointestinal: Positive for nausea and vomiting. Negative for abdominal pain, blood in stool and diarrhea.   Genitourinary: Negative for difficulty urinating, dysuria and frequency.   Musculoskeletal: Negative.    Skin: Negative.  Negative for rash.   Neurological: Negative.  Negative for weakness and headaches.   Psychiatric/Behavioral: The patient is nervous/anxious.    All other systems reviewed and are negative.      PHYSICAL EXAM  ED Triage Vitals   Temp Heart Rate Resp BP SpO2   17 1720 17 1716 17 1717 17 1720  01/07/17 1716   97 °F (36.1 °C) 115 40 156/117 99 %      Temp src Heart Rate Source Patient Position BP Location FiO2 (%)   -- -- -- -- --              Physical Exam   Constitutional: She is oriented to person, place, and time and well-developed, well-nourished, and in no distress. No distress.   HENT:   Head: Normocephalic and atraumatic.   Eyes: EOM are normal. Pupils are equal, round, and reactive to light.   Neck: Normal range of motion. Neck supple.   Cardiovascular: Normal rate, regular rhythm and normal heart sounds.    No murmur heard.  Pulmonary/Chest: Effort normal and breath sounds normal. No respiratory distress.   Abdominal: Soft. There is no tenderness. There is no rebound and no guarding.   Musculoskeletal: Normal range of motion. She exhibits no edema.   Homans negative.    Neurological: She is alert and oriented to person, place, and time. She has normal sensation and normal strength.   Skin: Skin is warm and dry. No rash noted.   Psychiatric: Mood and affect normal.   Nursing note and vitals reviewed.      LAB RESULTS  Lab Results (last 24 hours)     Procedure Component Value Units Date/Time    CBC & Differential [[id number]] Collected:  01/07/17 1721    Specimen:  Blood Updated:  01/07/17 1732    Narrative:       The following orders were created for panel order CBC & Differential.  Procedure                               Abnormality         Status                     ---------                               -----------         ------                     CBC Auto Differential[[id number]]         Abnormal            Final result                 Please view results for these tests on the individual orders.    Comprehensive Metabolic Panel [[id number]]  (Abnormal) Collected:  01/07/17 1721    Specimen:  Blood Updated:  01/07/17 1757     Glucose 102 (H) mg/dL      BUN 8 mg/dL      Creatinine 0.73 mg/dL      Sodium 140 mmol/L      Potassium 4.1 mmol/L      Chloride 99 mmol/L      CO2 25.3 mmol/L      Calcium  9.1 mg/dL      Total Protein 7.3 g/dL      Albumin 3.60 g/dL      ALT (SGPT) 26 U/L      AST (SGOT) 21 U/L      Alkaline Phosphatase 139 (H) U/L      Total Bilirubin 0.3 mg/dL      eGFR Non African Amer 85 mL/min/1.73      Globulin 3.7 gm/dL      A/G Ratio 1.0 g/dL      BUN/Creatinine Ratio 11.0      Anion Gap 15.7 mmol/L     BNP [[id number]]  (Abnormal) Collected:  01/07/17 1721    Specimen:  Blood Updated:  01/07/17 1756     proBNP 931.2 (H) pg/mL     Narrative:       Among patients with dyspnea, NT-proBNP is highly sensitive for the detection of acute congestive heart failure. In addition NT-proBNP of <300 pg/ml effectively rules out acute congestive heart failure with 99% negative predictive value.    Troponin [[id number]]  (Normal) Collected:  01/07/17 1721    Specimen:  Blood Updated:  01/07/17 1757     Troponin T <0.010 ng/mL     Narrative:       Troponin T Reference Ranges:  Less than 0.03 ng/mL:    Negative for AMI  0.03 to 0.09 ng/mL:      Indeterminant for AMI  Greater than 0.09 ng/mL: Positive for AMI    hCG, Serum, Qualitative [[id number]]  (Normal) Collected:  01/07/17 1721    Specimen:  Blood Updated:  01/07/17 1802     HCG Qualitative Negative     CBC Auto Differential [[id number]]  (Abnormal) Collected:  01/07/17 1721    Specimen:  Blood Updated:  01/07/17 1732     WBC 7.69 10*3/mm3      RBC 4.31 10*6/mm3      Hemoglobin 12.6 g/dL      Hematocrit 38.3 %      MCV 88.9 fL      MCH 29.2 pg      MCHC 32.9 g/dL      RDW 15.3 (H) %      RDW-SD 49.0 fl      MPV 9.6 fL      Platelets 298 10*3/mm3      Neutrophil % 73.1 %      Lymphocyte % 19.6 %      Monocyte % 4.4 (L) %      Eosinophil % 2.5 %      Basophil % 0.4 %      Immature Grans % 0.0 %      Neutrophils, Absolute 5.62 10*3/mm3      Lymphocytes, Absolute 1.51 10*3/mm3      Monocytes, Absolute 0.34 10*3/mm3      Eosinophils, Absolute 0.19 10*3/mm3      Basophils, Absolute 0.03 10*3/mm3      Immature Grans, Absolute 0.00 10*3/mm3     D-dimer, Quantitative  [[id number]]  (Abnormal) Collected:  01/07/17 1721    Specimen:  Blood Updated:  01/07/17 1822     D-Dimer, Quantitative 1.40 (H) MCGFEU/mL           I ordered the above labs and reviewed the results    RADIOLOGY  CT Angiogram Chest With Contrast   Preliminary Result   1.  Bilateral pulmonary emboli, thrombus burden is moderately large, no   evidence for right ventricular strain.   2.  Opacities in the right middle lobe and left lower lobe are likely   from infiltrate, less likely lung infarcts.            Findings called to Dr. Viadl, 9:30 PM.          XR Chest 2 View   Preliminary Result   Negative chest x-ray.               I ordered the above noted radiological studies. Interpreted by radiologist. Reviewed by me in PACS.       PROCEDURES  Procedures  EKG           EKG time: 1911  Rhythm/Rate: NSR, 68  P waves and MA: normal  QRS, axis: normal   ST and T waves: normal     Interpreted Contemporaneously by me, independently viewed  No prior for comparison      PROGRESS AND CONSULTS  ED Course     1721- Ordered blood work, hCG serum, Troponin, BNP, CXR, and EKG for further evaluation.    1747- Ordered Xanax and Phenergan. Ordered D-dimer for further evaluation.    1748- Ordered EKG for further evaluation.    1931- Pt rechecked and is resting comfortably. Pt states that she is feeling slightly better, but is still complaining of nausea, lightheadedness, and SOB.    1933- Ordered Ativan.    1934- Ordered CT Angiogram Chest for further evaluation.     2023- Ordered Phenergan for nausea.    2103- Pt rechecked and is resting comfortably. Pt states that she is still dizziness, SOB, and lightheadedness even after receiving more medication.    2229- Pt rechecked and is resting comfortably. Pt states that she is still complaining of SOB and lightheadedness. Discussed with pt and family CT Angiogram Chest result which shows evidence of blood clots in bilateral lungs and probable bilateral pneumonia or infarcts..    2234- Call  to [facility].    0254- Discussed pt's case with Dr. Caceres who agrees to admit the patient.    MEDICAL DECISION MAKING  Results were reviewed/discussed with the patient and they were also made aware of online access. Pt also made aware that some labs, such as cultures, will not be resulted during ER visit and follow up with PMD is necessary.     MDM  Number of Diagnoses or Management Options     Amount and/or Complexity of Data Reviewed  Clinical lab tests: ordered and reviewed  Tests in the radiology section of CPT®: ordered and reviewed (CT Angiogram Chest shows  1.  Bilateral pulmonary emboli, thrombus burden is moderately large, no  evidence for right ventricular strain.  2.  Opacities in the right middle lobe and left lower lobe are likely  from infiltrate, less likely lung infarcts.          CXR is negative.)  Tests in the medicine section of CPT®: ordered and reviewed (See EKG procedure note.)  Independent visualization of images, tracings, or specimens: yes    Patient Progress  Patient progress: stable         DIAGNOSIS  Final diagnoses:   Other acute pulmonary embolism without acute cor pulmonale   Pneumonia of both lungs due to infectious organism, unspecified part of lung       DISPOSITION  ADMISSION    Discussed treatment plan and reason for admission with pt/family and admitting physician.  Pt/family voiced understanding of the plan for admission for further testing/treatment as needed.         Latest Documented Vital Signs:  As of 10:58 PM  BP- 161/99 HR- 63 Temp- 97 °F (36.1 °C) O2 sat- 97%    --  Documentation assistance provided by artemio Malik for Dr. Vidal.  Information recorded by the scribe was done at my direction and has been verified and validated by me.              Tavia Malik  01/07/17 [id number]       Jovno Vidal MD  01/08/17 0020

## 2017-01-07 NOTE — ED NOTES
Pt unable to get a good breath. Pt feels like she is not moving air through lungs, specifically lower lungs. Symptoms started this morning and have gotten worse throughout day. Pt also c/o left sided chest pain      Kadi Rodríguez RN  01/07/17 [id number]

## 2017-01-08 ENCOUNTER — APPOINTMENT (OUTPATIENT)
Dept: CARDIOLOGY | Facility: HOSPITAL | Age: 47
End: 2017-01-08
Attending: INTERNAL MEDICINE

## 2017-01-08 LAB
ANION GAP SERPL CALCULATED.3IONS-SCNC: 10.2 MMOL/L
B PERT DNA SPEC QL NAA+PROBE: NOT DETECTED
BASOPHILS # BLD AUTO: 0.04 10*3/MM3 (ref 0–0.2)
BASOPHILS NFR BLD AUTO: 0.5 % (ref 0–1.5)
BH CV LOW VAS LEFT PERONEAL VESSEL: 1
BH CV LOWER VASCULAR LEFT COMMON FEMORAL AUGMENT: NORMAL
BH CV LOWER VASCULAR LEFT COMMON FEMORAL COMPETENT: NORMAL
BH CV LOWER VASCULAR LEFT COMMON FEMORAL COMPRESS: NORMAL
BH CV LOWER VASCULAR LEFT COMMON FEMORAL PHASIC: NORMAL
BH CV LOWER VASCULAR LEFT COMMON FEMORAL SPONT: NORMAL
BH CV LOWER VASCULAR LEFT DISTAL FEMORAL COMPRESS: NORMAL
BH CV LOWER VASCULAR LEFT GASTRONEMIUS COMPRESS: NORMAL
BH CV LOWER VASCULAR LEFT GREATER SAPH AK COMPRESS: NORMAL
BH CV LOWER VASCULAR LEFT GREATER SAPH BK COMPRESS: NORMAL
BH CV LOWER VASCULAR LEFT MID FEMORAL AUGMENT: NORMAL
BH CV LOWER VASCULAR LEFT MID FEMORAL COMPETENT: NORMAL
BH CV LOWER VASCULAR LEFT MID FEMORAL COMPRESS: NORMAL
BH CV LOWER VASCULAR LEFT MID FEMORAL PHASIC: NORMAL
BH CV LOWER VASCULAR LEFT MID FEMORAL SPONT: NORMAL
BH CV LOWER VASCULAR LEFT PERONEAL COMPRESS: NORMAL
BH CV LOWER VASCULAR LEFT PERONEAL THROMBUS: NORMAL
BH CV LOWER VASCULAR LEFT POPLITEAL AUGMENT: NORMAL
BH CV LOWER VASCULAR LEFT POPLITEAL COMPETENT: NORMAL
BH CV LOWER VASCULAR LEFT POPLITEAL COMPRESS: NORMAL
BH CV LOWER VASCULAR LEFT POPLITEAL PHASIC: NORMAL
BH CV LOWER VASCULAR LEFT POPLITEAL SPONT: NORMAL
BH CV LOWER VASCULAR LEFT POSTERIOR TIBIAL COMPRESS: NORMAL
BH CV LOWER VASCULAR LEFT PROXIMAL FEMORAL COMPRESS: NORMAL
BH CV LOWER VASCULAR LEFT SAPHENOFEMORAL JUNCTION AUGMENT: NORMAL
BH CV LOWER VASCULAR LEFT SAPHENOFEMORAL JUNCTION COMPETENT: NORMAL
BH CV LOWER VASCULAR LEFT SAPHENOFEMORAL JUNCTION COMPRESS: NORMAL
BH CV LOWER VASCULAR LEFT SAPHENOFEMORAL JUNCTION PHASIC: NORMAL
BH CV LOWER VASCULAR LEFT SAPHENOFEMORAL JUNCTION SPONT: NORMAL
BH CV LOWER VASCULAR RIGHT COMMON FEMORAL AUGMENT: NORMAL
BH CV LOWER VASCULAR RIGHT COMMON FEMORAL COMPETENT: NORMAL
BH CV LOWER VASCULAR RIGHT COMMON FEMORAL COMPRESS: NORMAL
BH CV LOWER VASCULAR RIGHT COMMON FEMORAL PHASIC: NORMAL
BH CV LOWER VASCULAR RIGHT COMMON FEMORAL SPONT: NORMAL
BH CV LOWER VASCULAR RIGHT DISTAL FEMORAL COMPRESS: NORMAL
BH CV LOWER VASCULAR RIGHT GASTRONEMIUS COMPRESS: NORMAL
BH CV LOWER VASCULAR RIGHT GREATER SAPH AK COMPRESS: NORMAL
BH CV LOWER VASCULAR RIGHT GREATER SAPH BK COMPRESS: NORMAL
BH CV LOWER VASCULAR RIGHT MID FEMORAL AUGMENT: NORMAL
BH CV LOWER VASCULAR RIGHT MID FEMORAL COMPETENT: NORMAL
BH CV LOWER VASCULAR RIGHT MID FEMORAL COMPRESS: NORMAL
BH CV LOWER VASCULAR RIGHT MID FEMORAL PHASIC: NORMAL
BH CV LOWER VASCULAR RIGHT MID FEMORAL SPONT: NORMAL
BH CV LOWER VASCULAR RIGHT PERONEAL COMPRESS: NORMAL
BH CV LOWER VASCULAR RIGHT POPLITEAL AUGMENT: NORMAL
BH CV LOWER VASCULAR RIGHT POPLITEAL COMPETENT: NORMAL
BH CV LOWER VASCULAR RIGHT POPLITEAL COMPRESS: NORMAL
BH CV LOWER VASCULAR RIGHT POPLITEAL PHASIC: NORMAL
BH CV LOWER VASCULAR RIGHT POPLITEAL SPONT: NORMAL
BH CV LOWER VASCULAR RIGHT POSTERIOR TIBIAL COMPRESS: NORMAL
BH CV LOWER VASCULAR RIGHT PROXIMAL FEMORAL COMPRESS: NORMAL
BH CV LOWER VASCULAR RIGHT SAPHENOFEMORAL JUNCTION AUGMENT: NORMAL
BH CV LOWER VASCULAR RIGHT SAPHENOFEMORAL JUNCTION COMPETENT: NORMAL
BH CV LOWER VASCULAR RIGHT SAPHENOFEMORAL JUNCTION COMPRESS: NORMAL
BH CV LOWER VASCULAR RIGHT SAPHENOFEMORAL JUNCTION PHASIC: NORMAL
BH CV LOWER VASCULAR RIGHT SAPHENOFEMORAL JUNCTION SPONT: NORMAL
BUN BLD-MCNC: 6 MG/DL (ref 6–20)
BUN/CREAT SERPL: 7.5 (ref 7–25)
C PNEUM DNA NPH QL NAA+NON-PROBE: NOT DETECTED
CALCIUM SPEC-SCNC: 8.3 MG/DL (ref 8.6–10.5)
CHLORIDE SERPL-SCNC: 102 MMOL/L (ref 98–107)
CHOLEST SERPL-MCNC: 159 MG/DL (ref 0–200)
CO2 SERPL-SCNC: 26.8 MMOL/L (ref 22–29)
CREAT BLD-MCNC: 0.8 MG/DL (ref 0.57–1)
CRP SERPL-MCNC: 0.71 MG/DL (ref 0–0.5)
DEPRECATED RDW RBC AUTO: 52.5 FL (ref 37–54)
EOSINOPHIL # BLD AUTO: 0.31 10*3/MM3 (ref 0–0.7)
EOSINOPHIL NFR BLD AUTO: 3.9 % (ref 0.3–6.2)
ERYTHROCYTE [DISTWIDTH] IN BLOOD BY AUTOMATED COUNT: 16 % (ref 11.7–13)
FERRITIN SERPL-MCNC: 15.36 NG/ML (ref 13–150)
FLUAV H1 2009 PAND RNA NPH QL NAA+PROBE: NOT DETECTED
FLUAV H1 HA GENE NPH QL NAA+PROBE: NOT DETECTED
FLUAV H3 RNA NPH QL NAA+PROBE: NOT DETECTED
FLUAV SUBTYP SPEC NAA+PROBE: NOT DETECTED
FLUBV RNA ISLT QL NAA+PROBE: NOT DETECTED
FOLATE SERPL-MCNC: 9.56 NG/ML (ref 4.78–24.2)
GFR SERPL CREATININE-BSD FRML MDRD: 77 ML/MIN/1.73
GLUCOSE BLD-MCNC: 126 MG/DL (ref 65–99)
HADV DNA SPEC NAA+PROBE: NOT DETECTED
HCOV 229E RNA SPEC QL NAA+PROBE: NOT DETECTED
HCOV HKU1 RNA SPEC QL NAA+PROBE: NOT DETECTED
HCOV NL63 RNA SPEC QL NAA+PROBE: NOT DETECTED
HCOV OC43 RNA SPEC QL NAA+PROBE: NOT DETECTED
HCT VFR BLD AUTO: 35.4 % (ref 35.6–45.5)
HDLC SERPL-MCNC: 59 MG/DL (ref 40–60)
HGB BLD-MCNC: 11 G/DL (ref 11.9–15.5)
HGB RETIC QN: 29.2 PG (ref 32.7–38.6)
HMPV RNA NPH QL NAA+NON-PROBE: NOT DETECTED
HPIV1 RNA SPEC QL NAA+PROBE: NOT DETECTED
HPIV2 RNA SPEC QL NAA+PROBE: NOT DETECTED
HPIV3 RNA NPH QL NAA+PROBE: NOT DETECTED
HPIV4 P GENE NPH QL NAA+PROBE: NOT DETECTED
IMM GRANULOCYTES # BLD: 0 10*3/MM3 (ref 0–0.03)
IMM GRANULOCYTES NFR BLD: 0 % (ref 0–0.5)
IMM RETICS NFR: 25.9 % (ref 0.7–13.7)
IRON 24H UR-MRATE: 29 MCG/DL (ref 37–145)
IRON SATN MFR SERPL: 7 % (ref 20–50)
LDLC SERPL CALC-MCNC: 63 MG/DL (ref 0–100)
LDLC/HDLC SERPL: 1.07 {RATIO}
LYMPHOCYTES # BLD AUTO: 2.01 10*3/MM3 (ref 0.9–4.8)
LYMPHOCYTES NFR BLD AUTO: 25.6 % (ref 19.6–45.3)
M PNEUMO IGG SER IA-ACNC: NOT DETECTED
MCH RBC QN AUTO: 28.5 PG (ref 26.9–32)
MCHC RBC AUTO-ENTMCNC: 31.1 G/DL (ref 32.4–36.3)
MCV RBC AUTO: 91.7 FL (ref 80.5–98.2)
MONOCYTES # BLD AUTO: 0.48 10*3/MM3 (ref 0.2–1.2)
MONOCYTES NFR BLD AUTO: 6.1 % (ref 5–12)
NEUTROPHILS # BLD AUTO: 5.02 10*3/MM3 (ref 1.9–8.1)
NEUTROPHILS NFR BLD AUTO: 63.9 % (ref 42.7–76)
PLATELET # BLD AUTO: 299 10*3/MM3 (ref 140–500)
PMV BLD AUTO: 9.7 FL (ref 6–12)
POTASSIUM BLD-SCNC: 3.6 MMOL/L (ref 3.5–5.2)
PROCALCITONIN SERPL-MCNC: 0.03 NG/ML (ref 0.1–0.25)
RBC # BLD AUTO: 3.86 10*6/MM3 (ref 3.9–5.2)
RETICS/RBC NFR AUTO: 2.42 % (ref 0.5–1.5)
RHINOVIRUS RNA SPEC NAA+PROBE: NOT DETECTED
RSV RNA NPH QL NAA+NON-PROBE: NOT DETECTED
SODIUM BLD-SCNC: 139 MMOL/L (ref 136–145)
TIBC SERPL-MCNC: 407 MCG/DL (ref 298–536)
TRANSFERRIN SERPL-MCNC: 273 MG/DL (ref 200–360)
TRIGL SERPL-MCNC: 185 MG/DL (ref 0–150)
VLDLC SERPL-MCNC: 37 MG/DL (ref 5–40)
WBC NRBC COR # BLD: 7.86 10*3/MM3 (ref 4.5–10.7)

## 2017-01-08 PROCEDURE — 81240 F2 GENE: CPT | Performed by: INTERNAL MEDICINE

## 2017-01-08 PROCEDURE — 85670 THROMBIN TIME PLASMA: CPT | Performed by: INTERNAL MEDICINE

## 2017-01-08 PROCEDURE — 87798 DETECT AGENT NOS DNA AMP: CPT | Performed by: INTERNAL MEDICINE

## 2017-01-08 PROCEDURE — 82728 ASSAY OF FERRITIN: CPT | Performed by: INTERNAL MEDICINE

## 2017-01-08 PROCEDURE — 81241 F5 GENE: CPT | Performed by: INTERNAL MEDICINE

## 2017-01-08 PROCEDURE — 99223 1ST HOSP IP/OBS HIGH 75: CPT | Performed by: INTERNAL MEDICINE

## 2017-01-08 PROCEDURE — 94640 AIRWAY INHALATION TREATMENT: CPT

## 2017-01-08 PROCEDURE — 87581 M.PNEUMON DNA AMP PROBE: CPT | Performed by: INTERNAL MEDICINE

## 2017-01-08 PROCEDURE — 80061 LIPID PANEL: CPT | Performed by: INTERNAL MEDICINE

## 2017-01-08 PROCEDURE — 84466 ASSAY OF TRANSFERRIN: CPT | Performed by: INTERNAL MEDICINE

## 2017-01-08 PROCEDURE — 85732 THROMBOPLASTIN TIME PARTIAL: CPT | Performed by: INTERNAL MEDICINE

## 2017-01-08 PROCEDURE — 85613 RUSSELL VIPER VENOM DILUTED: CPT | Performed by: INTERNAL MEDICINE

## 2017-01-08 PROCEDURE — 83540 ASSAY OF IRON: CPT | Performed by: INTERNAL MEDICINE

## 2017-01-08 PROCEDURE — 84145 PROCALCITONIN (PCT): CPT | Performed by: INTERNAL MEDICINE

## 2017-01-08 PROCEDURE — 94799 UNLISTED PULMONARY SVC/PX: CPT

## 2017-01-08 PROCEDURE — 86140 C-REACTIVE PROTEIN: CPT | Performed by: INTERNAL MEDICINE

## 2017-01-08 PROCEDURE — 87486 CHLMYD PNEUM DNA AMP PROBE: CPT | Performed by: INTERNAL MEDICINE

## 2017-01-08 PROCEDURE — 25010000002 ENOXAPARIN PER 10 MG: Performed by: INTERNAL MEDICINE

## 2017-01-08 PROCEDURE — 85046 RETICYTE/HGB CONCENTRATE: CPT | Performed by: INTERNAL MEDICINE

## 2017-01-08 PROCEDURE — 93970 EXTREMITY STUDY: CPT

## 2017-01-08 PROCEDURE — 87040 BLOOD CULTURE FOR BACTERIA: CPT | Performed by: FAMILY MEDICINE

## 2017-01-08 PROCEDURE — 85705 THROMBOPLASTIN INHIBITION: CPT | Performed by: INTERNAL MEDICINE

## 2017-01-08 PROCEDURE — 85025 COMPLETE CBC W/AUTO DIFF WBC: CPT | Performed by: INTERNAL MEDICINE

## 2017-01-08 PROCEDURE — 87633 RESP VIRUS 12-25 TARGETS: CPT | Performed by: INTERNAL MEDICINE

## 2017-01-08 PROCEDURE — 82746 ASSAY OF FOLIC ACID SERUM: CPT | Performed by: INTERNAL MEDICINE

## 2017-01-08 PROCEDURE — 80048 BASIC METABOLIC PNL TOTAL CA: CPT | Performed by: INTERNAL MEDICINE

## 2017-01-08 RX ORDER — CLONAZEPAM 1 MG/1
2 TABLET ORAL DAILY
Status: DISCONTINUED | OUTPATIENT
Start: 2017-01-08 | End: 2017-01-08

## 2017-01-08 RX ORDER — ONDANSETRON 2 MG/ML
4 INJECTION INTRAMUSCULAR; INTRAVENOUS EVERY 6 HOURS PRN
Status: DISCONTINUED | OUTPATIENT
Start: 2017-01-08 | End: 2017-01-11 | Stop reason: HOSPADM

## 2017-01-08 RX ORDER — ZIPRASIDONE HYDROCHLORIDE 60 MG/1
120 CAPSULE ORAL NIGHTLY
Status: DISCONTINUED | OUTPATIENT
Start: 2017-01-08 | End: 2017-01-08

## 2017-01-08 RX ORDER — VILAZODONE HYDROCHLORIDE 40 MG/1
40 TABLET ORAL DAILY
Status: DISCONTINUED | OUTPATIENT
Start: 2017-01-08 | End: 2017-01-11 | Stop reason: HOSPADM

## 2017-01-08 RX ORDER — DEXTROAMPHETAMINE SACCHARATE, AMPHETAMINE ASPARTATE, DEXTROAMPHETAMINE SULFATE AND AMPHETAMINE SULFATE 5; 5; 5; 5 MG/1; MG/1; MG/1; MG/1
20 TABLET ORAL DAILY
Status: DISCONTINUED | OUTPATIENT
Start: 2017-01-08 | End: 2017-01-08

## 2017-01-08 RX ORDER — NITROGLYCERIN 0.4 MG/1
0.4 TABLET SUBLINGUAL
Status: DISCONTINUED | OUTPATIENT
Start: 2017-01-08 | End: 2017-01-11 | Stop reason: HOSPADM

## 2017-01-08 RX ORDER — TIZANIDINE 4 MG/1
4 TABLET ORAL EVERY 8 HOURS SCHEDULED
Status: DISCONTINUED | OUTPATIENT
Start: 2017-01-08 | End: 2017-01-11 | Stop reason: HOSPADM

## 2017-01-08 RX ORDER — ONDANSETRON 4 MG/1
4 TABLET, FILM COATED ORAL EVERY 6 HOURS PRN
Status: DISCONTINUED | OUTPATIENT
Start: 2017-01-08 | End: 2017-01-11 | Stop reason: HOSPADM

## 2017-01-08 RX ORDER — ZIPRASIDONE HYDROCHLORIDE 60 MG/1
60 CAPSULE ORAL ONCE
Status: COMPLETED | OUTPATIENT
Start: 2017-01-08 | End: 2017-01-08

## 2017-01-08 RX ORDER — GABAPENTIN 600 MG/1
600 TABLET ORAL EVERY 8 HOURS SCHEDULED
Status: DISCONTINUED | OUTPATIENT
Start: 2017-01-08 | End: 2017-01-11 | Stop reason: HOSPADM

## 2017-01-08 RX ORDER — ALBUTEROL SULFATE 2.5 MG/3ML
2.5 SOLUTION RESPIRATORY (INHALATION) EVERY 6 HOURS PRN
Status: DISCONTINUED | OUTPATIENT
Start: 2017-01-08 | End: 2017-01-11 | Stop reason: HOSPADM

## 2017-01-08 RX ORDER — CLONAZEPAM 1 MG/1
2 TABLET ORAL EVERY 12 HOURS SCHEDULED
Status: DISCONTINUED | OUTPATIENT
Start: 2017-01-08 | End: 2017-01-11 | Stop reason: HOSPADM

## 2017-01-08 RX ORDER — ZIPRASIDONE HYDROCHLORIDE 60 MG/1
60 CAPSULE ORAL EVERY 12 HOURS
Status: DISCONTINUED | OUTPATIENT
Start: 2017-01-08 | End: 2017-01-08

## 2017-01-08 RX ORDER — ZIPRASIDONE HYDROCHLORIDE 60 MG/1
60 CAPSULE ORAL NIGHTLY
Status: DISCONTINUED | OUTPATIENT
Start: 2017-01-08 | End: 2017-01-11 | Stop reason: HOSPADM

## 2017-01-08 RX ORDER — SODIUM CHLORIDE 0.9 % (FLUSH) 0.9 %
1-10 SYRINGE (ML) INJECTION AS NEEDED
Status: DISCONTINUED | OUTPATIENT
Start: 2017-01-08 | End: 2017-01-11 | Stop reason: HOSPADM

## 2017-01-08 RX ORDER — OXYCODONE HYDROCHLORIDE AND ACETAMINOPHEN 5; 325 MG/1; MG/1
1 TABLET ORAL ONCE
Status: COMPLETED | OUTPATIENT
Start: 2017-01-08 | End: 2017-01-08

## 2017-01-08 RX ORDER — OXYCODONE HYDROCHLORIDE AND ACETAMINOPHEN 5; 325 MG/1; MG/1
1 TABLET ORAL 3 TIMES DAILY PRN
Status: DISCONTINUED | OUTPATIENT
Start: 2017-01-08 | End: 2017-01-09

## 2017-01-08 RX ORDER — BUDESONIDE AND FORMOTEROL FUMARATE DIHYDRATE 160; 4.5 UG/1; UG/1
2 AEROSOL RESPIRATORY (INHALATION)
Status: DISCONTINUED | OUTPATIENT
Start: 2017-01-08 | End: 2017-01-11 | Stop reason: HOSPADM

## 2017-01-08 RX ORDER — ZIPRASIDONE HYDROCHLORIDE 60 MG/1
60 CAPSULE ORAL NIGHTLY
Status: DISCONTINUED | OUTPATIENT
Start: 2017-01-08 | End: 2017-01-08

## 2017-01-08 RX ORDER — DEXTROAMPHETAMINE SACCHARATE, AMPHETAMINE ASPARTATE, DEXTROAMPHETAMINE SULFATE AND AMPHETAMINE SULFATE 5; 5; 5; 5 MG/1; MG/1; MG/1; MG/1
20 TABLET ORAL DAILY
Status: DISCONTINUED | OUTPATIENT
Start: 2017-01-08 | End: 2017-01-11 | Stop reason: HOSPADM

## 2017-01-08 RX ORDER — ONDANSETRON 4 MG/1
4 TABLET, ORALLY DISINTEGRATING ORAL EVERY 6 HOURS PRN
Status: DISCONTINUED | OUTPATIENT
Start: 2017-01-08 | End: 2017-01-11 | Stop reason: HOSPADM

## 2017-01-08 RX ADMIN — CLONAZEPAM 2 MG: 1 TABLET ORAL at 09:22

## 2017-01-08 RX ADMIN — ZIPRASIDONE HYDROCHLORIDE 60 MG: 60 CAPSULE ORAL at 03:18

## 2017-01-08 RX ADMIN — BUDESONIDE AND FORMOTEROL FUMARATE DIHYDRATE 2 PUFF: 160; 4.5 AEROSOL RESPIRATORY (INHALATION) at 20:58

## 2017-01-08 RX ADMIN — CLONAZEPAM 2 MG: 1 TABLET ORAL at 20:01

## 2017-01-08 RX ADMIN — ENOXAPARIN SODIUM 160 MG: 80 INJECTION SUBCUTANEOUS at 14:44

## 2017-01-08 RX ADMIN — ZIPRASIDONE HYDROCHLORIDE 60 MG: 60 CAPSULE ORAL at 22:20

## 2017-01-08 RX ADMIN — OXYCODONE HYDROCHLORIDE AND ACETAMINOPHEN 1 TABLET: 5; 325 TABLET ORAL at 22:20

## 2017-01-08 RX ADMIN — GABAPENTIN 600 MG: 600 TABLET ORAL at 14:44

## 2017-01-08 RX ADMIN — GABAPENTIN 600 MG: 600 TABLET ORAL at 09:22

## 2017-01-08 RX ADMIN — CLONAZEPAM 2 MG: 1 TABLET ORAL at 03:18

## 2017-01-08 RX ADMIN — OXYCODONE HYDROCHLORIDE AND ACETAMINOPHEN 1 TABLET: 5; 325 TABLET ORAL at 03:18

## 2017-01-08 RX ADMIN — TIZANIDINE 4 MG: 4 TABLET ORAL at 09:23

## 2017-01-08 RX ADMIN — TIZANIDINE 4 MG: 4 TABLET ORAL at 22:20

## 2017-01-08 RX ADMIN — GABAPENTIN 600 MG: 600 TABLET ORAL at 22:20

## 2017-01-08 RX ADMIN — OXYCODONE HYDROCHLORIDE AND ACETAMINOPHEN 1 TABLET: 5; 325 TABLET ORAL at 17:23

## 2017-01-08 RX ADMIN — TIZANIDINE 4 MG: 4 TABLET ORAL at 14:44

## 2017-01-08 RX ADMIN — OXYCODONE HYDROCHLORIDE AND ACETAMINOPHEN 1 TABLET: 5; 325 TABLET ORAL at 09:26

## 2017-01-08 RX ADMIN — DEXTROAMPHETAMINE SACCHARATE, AMPHETAMINE ASPARTATE, DEXTROAMPHETAMINE SULFATE AND AMPHETAMINE SULFATE 20 MG: 5; 5; 5; 5 TABLET ORAL at 14:44

## 2017-01-08 RX ADMIN — VILAZODONE HYDROCHLORIDE 40 MG: 40 TABLET ORAL at 09:22

## 2017-01-08 NOTE — PLAN OF CARE
Problem: Patient Care Overview (Adult)  Goal: Plan of Care Review  Outcome: Ongoing (interventions implemented as appropriate)    17 1601   Coping/Psychosocial Response Interventions   Plan Of Care Reviewed With patient   Patient Care Overview   Progress no change   Outcome Evaluation   Outcome Summary/Follow up Plan Doppler & Echo completed, prelim report pt has Left calf vein thrombosis, 2L O2 pt reports orthopnea        Goal: Adult Individualization and Mutuality  Outcome: Ongoing (interventions implemented as appropriate)  Goal: Discharge Needs Assessment  Outcome: Ongoing (interventions implemented as appropriate)    Problem: Pain, Acute (Adult)  Goal: Acceptable Pain Control/Comfort Level  Outcome: Ongoing (interventions implemented as appropriate)    Problem: Tissue Perfusion, Ineffective Peripheral (Adult)  Goal: Adequate Tissue Perfusion  Outcome: Ongoing (interventions implemented as appropriate)    Problem: Health Knowledge, Opportunity for Enhanced (Adult,NICU,[place],Obstetrics,Pediatric)  Goal: Knowledgeable about Health Subject/Topic  Outcome: Ongoing (interventions implemented as appropriate)

## 2017-01-08 NOTE — H&P
[facility] Admission H&P    Patient Care Team:  Dilma Landeros MD as PCP - General    Chief complaint: Shortness of breath, chest pain    History of Present Illness    This is a 46-year-old white female who presented to the hospital with acute onset shortness of breath that started this morning.  She was at home with her mother.  She was sleeping upright in a chair and her mother noticed that she was having periods of apnea.  She woke her daughter up and it was found that she was having shortness of breath that was worse with exertion.  She also had associated sharp chest pain that was worse with deep inspiration.  Symptoms did not improve and thus she presented to the emergency room.  She is currently at the tail end of a viral illness that is essentially resolved at this point.  Last week she was having cough with nausea and diarrhea.  Again, these symptoms are all more or less resolved at this point.  The shortness of breath is certainly new however.  In the emergency room she had an elevated d-dimer and follow-up CT angiography of the chest that showed bilateral pulmonary emboli.  The patient states that she took Lovenox injections during the last 4 weeks of [place] with her daughter 22 years ago but does not remember ever being told she had a blood clot.  She denies any prolonged travel.  Her viral illness from last week more or less made her immobile for several days and she states she basically spent that whole time in bed.  Yesterday she did notice some right calf cramping that is currently resolved.  She has not noticed any exceptional leg swelling or redness.  She does suffer from fairly significant anxiety and she says that it is worse at present due to her shortness of breath.  She feels herself getting more anxious that then in turn exacerbates her symptoms of dyspnea.    Past Medical History   Diagnosis Date   • Anxiety    • Arthritis    • Bipolar depression    • Depression    • Hyperprolinemia    • Low  back pain    • Migraine    • Neuromuscular disorder    • Obesity    • Pituitary tumor    • Pulmonary embolism without acute cor pulmonale 2017     Past Surgical History   Procedure Laterality Date   • Bariatric surgery     •  section     • Back surgery     • Cholecystectomy     • Dilation and curettage, diagnostic / therapeutic       Family History   Problem Relation Age of Onset   • Heart disease Mother    • Hypertension Mother    • Bipolar disorder Mother    • Heart disease Father    • Diabetes Father    • Alcohol abuse Brother    • Asthma Daughter    • Alcohol abuse Brother    • Bipolar disorder Brother      Social History   Substance Use Topics   • Smoking status: Former Smoker   • Smokeless tobacco: Never Used   • Alcohol use Yes      Comment: socially       (Not in a hospital admission)  Allergies:  Adhesive tape; Penicillins; Sulfa antibiotics; and Morphine    Review of Systems   Constitutional: Negative for chills and fever.   HENT: Negative for congestion and sore throat.    Eyes: Negative for visual disturbance.   Respiratory: Positive for shortness of breath. Negative for cough, chest tightness and wheezing.    Cardiovascular: Positive for chest pain. Negative for palpitations and leg swelling.   Gastrointestinal: Negative for abdominal distention, abdominal pain, diarrhea, nausea and vomiting.   Endocrine: Negative for polydipsia and polyuria.   Genitourinary: Negative for difficulty urinating, dysuria, frequency and urgency.   Musculoskeletal: Negative for arthralgias and myalgias.   Skin: Negative for color change and rash.   Neurological: Positive for light-headedness. Negative for dizziness.   Psychiatric/Behavioral: The patient is nervous/anxious.         PHYSICAL EXAM    Vital Signs  tMax 24 hrs:  Temp (24hrs), Av °F (36.1 °C), Min:97 °F (36.1 °C), Max:97 °F (36.1 °C)    Vitals Ranges:  Temp:  [97 °F (36.1 °C)] 97 °F (36.1 °C)  Heart Rate:  [] 91  Resp:  [24-40] 24  BP:  (156-196)/() 196/123    Physical Exam   Constitutional: She is oriented to person, place, and time. She appears well-developed and well-nourished. She appears distressed.   She appears in a mild amount of distress resulting from accommodation of her shortness of breath and anxiety.   HENT:   Head: Normocephalic and atraumatic.   Eyes: EOM are normal. Pupils are equal, round, and reactive to light.   Neck: Neck supple. No tracheal deviation present.   Cardiovascular: Normal rate and regular rhythm.  Exam reveals no gallop.    No murmur heard.  Pulmonary/Chest: Effort normal. She has no wheezes.   Upon entering the room the patient appeared significantly short of breath after just ambulating back from the bathroom.  Over the course of my interview her dyspnea improved significantly with rest.   Abdominal: Soft. Bowel sounds are normal. She exhibits no distension. There is no tenderness.   She is morbidly obese   Musculoskeletal: She exhibits no edema or tenderness.   Neurological: She is alert and oriented to person, place, and time. No cranial nerve deficit.   Skin: Skin is warm and dry.   Psychiatric:   She appears quite anxious that I feel his feeding into her symptoms of shortness of air.   Nursing note and vitals reviewed.      Results Review:    Results from last 7 days  Lab Units 01/07/17  1721   WBC 10*3/mm3 7.69   HEMOGLOBIN g/dL 12.6   HEMATOCRIT % 38.3   PLATELETS 10*3/mm3 298       Results from last 7 days  Lab Units 01/07/17  1721   SODIUM mmol/L 140   POTASSIUM mmol/L 4.1   CHLORIDE mmol/L 99   TOTAL CO2 mmol/L 25.3   BUN mg/dL 8   CREATININE mg/dL 0.73   CALCIUM mg/dL 9.1   BILIRUBIN mg/dL 0.3   ALK PHOS U/L 139*   ALT (SGPT) U/L 26   AST (SGOT) U/L 21   GLUCOSE mg/dL 102*     D-dimer 1.4  Troponin less than 0.01  ProBNP 931  HCG negative    Chest x-ray showed no acute infiltrate or consolidation    CT angiogram of the chest:  1.  Bilateral pulmonary emboli, thrombus burden is moderately large,  no  evidence for right ventricular strain.  2.  Opacities in the right middle lobe and left lower lobe are likely  from infiltrate, less likely lung infarcts.    EKG shows sinus rhythm.  No evidence of LVH     I reviewed the patient's new clinical results.  I reviewed the patient's new imaging results and agree with the interpretation.  I personally viewed and interpreted the patient's EKG/Telemetry data      Principal Problem:    Pulmonary embolism without acute cor pulmonale  Active Problems:    Anxiety    Depression    Morbid obesity due to excess calories    Pituitary adenoma      Assessment & Plan    The patient will be admitted and started on therapeutic Lovenox.  We'll check lower extremity Dopplers.  Will ask hematology to see her.  She states she had a hypercoagulable workup 3 years ago that was negative.  I'm not convinced she truly has a pneumonia as represented on her CT scan.  We will check a pro-calcitonin and a respiratory viral panel.  Hold on antibiotics for the moment.  Continue outpatient medications for anxiety and bipolar depression.  She appears hemodynamically stable at this time and will monitor on a telemetry floor.  Additional plans based on her clinical course.    I discussed the patients findings and my recommendations with patient and family    Jonel Caceres MD  01/07/17  11:52 PM

## 2017-01-08 NOTE — PLAN OF CARE
Problem: Patient Care Overview (Adult)  Goal: Plan of Care Review  Outcome: Ongoing (interventions implemented as appropriate)    17 0500   Coping/Psychosocial Response Interventions   Plan Of Care Reviewed With patient   Patient Care Overview   Progress no change   Outcome Evaluation   Outcome Summary/Follow up Plan Pt admitted to floor and settled in; will continue to monitor       Goal: Adult Individualization and Mutuality  Outcome: Ongoing (interventions implemented as appropriate)  Goal: Discharge Needs Assessment  Outcome: Ongoing (interventions implemented as appropriate)    Problem: Pain, Acute (Adult)  Goal: Identify Related Risk Factors and Signs and Symptoms  Outcome: Outcome(s) achieved Date Met:  17  Goal: Acceptable Pain Control/Comfort Level  Outcome: Ongoing (interventions implemented as appropriate)    Problem: Tissue Perfusion, Ineffective Peripheral (Adult)  Goal: Identify Related Risk Factors and Signs and Symptoms  Outcome: Outcome(s) achieved Date Met:  17  Goal: Adequate Tissue Perfusion  Outcome: Ongoing (interventions implemented as appropriate)    Problem: Health Knowledge, Opportunity for Enhanced (Adult,NICU,[place],Obstetrics,Pediatric)  Goal: Identify Related Risk Factors and Signs and Symptoms  Outcome: Outcome(s) achieved Date Met:  17  Goal: Knowledgeable about Health Subject/Topic  Outcome: Ongoing (interventions implemented as appropriate)

## 2017-01-08 NOTE — PROGRESS NOTES
"[facility]               ASSOCIATES     LOS: 1 day     Name: Radha Retana  Age: 46 y.o.  Sex: female  :  1970  MRN: [id number]         Primary Care Physician: Dilma Landeros MD    Subjective   Feels OK. Chest pain and SOA not bad at present. Discussed with RN.    Objective   Temp:  [97 °F (36.1 °C)-97.9 °F (36.6 °C)] 97.6 °F (36.4 °C)  Heart Rate:  [] 82  Resp:  [16-40] 16  BP: (100-196)/() 111/94  Body mass index is 62 kg/(m^2).  Diet Regular; Lactose Restricted    Objective:  General Appearance:  Comfortable and in no acute distress.    Vital signs: (most recent): Blood pressure 111/94, pulse 82, temperature 97.6 °F (36.4 °C), temperature source Oral, resp. rate 16, height 63\" (160 cm), weight 350 lb (159 kg), last menstrual period 2017, SpO2 90 %.    Lungs:  Normal respiratory rate and normal effort.  She is not in respiratory distress.  Breath sounds clear to auscultation.    Heart: Normal rate.  Regular rhythm.    Abdomen: Abdomen is soft.  (Obese)There is no abdominal tenderness.  There is no rebound tenderness.  There is no guarding.     Neurological: Patient is alert and oriented to person, place and time.    Skin:  Warm and dry.            Results Review:    Reviewed medications and new clinical results    amphetamine-dextroamphetamine 20 mg Oral Daily   clonazePAM 2 mg Oral Q12H   enoxaparin 1 mg/kg Subcutaneous Q12H   gabapentin 600 mg Oral Q8H   tiZANidine 4 mg Oral Q8H   vilazodone 40 mg Oral Daily   ziprasidone 60 mg Oral Q12H      Results from last 7 days  Lab Units 17  0453 17  1721   WBC 10*3/mm3 7.86 7.69   HEMOGLOBIN g/dL 11.0* 12.6   PLATELETS 10*3/mm3 299 298     Results from last 7 days  Lab Units 17  0453 17  1721   SODIUM mmol/L 139 140   POTASSIUM mmol/L 3.6 4.1   CHLORIDE mmol/L 102 99   TOTAL CO2 mmol/L 26.8 25.3   BUN mg/dL 6 8   CREATININE mg/dL 0.80 0.73   CALCIUM mg/dL 8.3* 9.1   GLUCOSE mg/dL " 126* 102*     Blood culture and resp panel so far negative  Procal 0.03    Assessment/Plan   Active Hospital Problems (** Indicates Principal Problem)    Diagnosis Date Noted   • **Pulmonary embolism without acute cor pulmonale [I26.99] 01/07/2017   • Pituitary adenoma [D35.2] 07/27/2016   • Morbid obesity due to excess calories [E66.01] 02/17/2016   • Anxiety [F41.9] 02/15/2016   • Depression [F32.9] 02/15/2016      Resolved Hospital Problems    Diagnosis Date Noted Date Resolved   No resolved problems to display.     · Continue anticoagulation  · Appreciate hematology  · Patient and family request pulm eval  · They also state that she takes Geodon only at night  · Discussed with family x2    Eddie Sue MD   01/08/17  1:39 PM

## 2017-01-08 NOTE — CONSULTS
[facility]  Dr Claudette Larson. MD [facility]  Pulmonary/Critical Care/Sleep Medicine    Reason for consult:    PE and Asthma    HPI:   This is a 46-year-old female who is morbidly obese had a upper respiratory tract infection around Radha time and she was immobile for a week.  She developed shortness breath all of a sudden on the day of admission was brought to the emergency room where she was found to have elevated dimers and followed by a CT which shows bilateral pulmonary embolism.  She is currently on Lovenox.  Oncology has seen the patient and is being worked up for hypercoagulable state.  In addition patient also has a history of asthma for which she is takes Symbicort and pro-air.  She has been stable at present she is not wheezing but complaints of shortness breath and she is using 2 L of oxygen.    Past Medical History   Diagnosis Date   • Anxiety    • Arthritis    • Bipolar depression    • Depression    • Hyperprolinemia    • Low back pain    • Migraine    • Neuromuscular disorder    • Obesity    • Pituitary tumor    • Pulmonary embolism without acute cor pulmonale 2017     Past Surgical History   Procedure Laterality Date   • Bariatric surgery     •  section     • Back surgery     • Cholecystectomy     • Dilation and curettage, diagnostic / therapeutic       No current facility-administered medications on file prior to encounter.      Current Outpatient Prescriptions on File Prior to Encounter   Medication Sig Dispense Refill   • amphetamine-dextroamphetamine (ADDERALL) 20 MG tablet   0   • cabergoline (DOSTINEX) 0.5 MG tablet Take 0.25 mg by mouth 2 (two) times a week.     • clonazePAM (KlonoPIN) 2 MG tablet Take 2 mg by mouth 2 (Two) Times a Day As Needed.     • gabapentin (NEURONTIN) 600 MG tablet TAKE 1 TABLET BY MOUTH THREE TIMES DAILY. 270 tablet 5   • ibuprofen (ADVIL,MOTRIN) 200 MG tablet Take 200 mg by mouth Every 6 (Six) Hours As Needed for mild pain (1-3).     •  lisdexamfetamine (VYVANSE) 70 MG capsule Take 70 mg by mouth every morning.     • naproxen sodium (ALEVE) 220 MG tablet Take 220 mg by mouth 2 (Two) Times a Day As Needed for mild pain (1-3).     • oxyCODONE-acetaminophen (PERCOCET) 5-325 MG per tablet Take 1 tablet by mouth 3 (Three) Times a Day As Needed (pain). 90 tablet 0   • tiZANidine (ZANAFLEX) 4 MG tablet TAKE 1 TABLET BY MOUTH THREE TIMES DAILY 270 tablet 0   • VICTOZA 18 MG/3ML solution pen-injector   6   • vilazodone (VIIBRYD) 40 MG tablet tablet Take by mouth daily.     • ziprasidone (GEODON) 60 MG capsule 60 mg 2 (Two) Times a Day.       Allergies as of 01/07/2017 - [place] as Reviewed 01/07/2017   Allergen Reaction Noted   • Adhesive tape  11/17/2016   • Penicillins  02/15/2016   • Sulfa antibiotics  02/15/2016   • Morphine Hives and Rash 05/17/2016     Social History   Substance Use Topics   • Smoking status: Former Smoker   • Smokeless tobacco: Never Used   • Alcohol use Yes      Comment: socially     Family History   Problem Relation Age of Onset   • Heart disease Mother    • Hypertension Mother    • Bipolar disorder Mother    • Heart disease Father    • Diabetes Father    • Alcohol abuse Brother    • Asthma Daughter    • Alcohol abuse Brother    • Bipolar disorder Brother        amphetamine-dextroamphetamine 20 mg Oral Daily   budesonide-formoterol 2 puff Inhalation BID - RT   clonazePAM 2 mg Oral Q12H   enoxaparin 1 mg/kg Subcutaneous Q12H   gabapentin 600 mg Oral Q8H   tiZANidine 4 mg Oral Q8H   vilazodone 40 mg Oral Daily   ziprasidone 60 mg Oral Nightly       Review of Systems   Constitution: Negative for chills and fever.   HENT: Negative for congestion, headaches, nosebleeds and stridor.    Eyes: Negative for blurred vision and discharge.   Cardiovascular: Negative for chest pain, cyanosis and leg swelling.   Respiratory: Positive for shortness of breath. Negative for cough, hemoptysis, snoring and wheezing.    Endocrine: Negative for cold  "intolerance and heat intolerance.   Skin: Negative for itching and rash.   Musculoskeletal: Negative for falls, neck pain and stiffness.   Gastrointestinal: Negative for diarrhea, heartburn, jaundice and vomiting.   Genitourinary: Negative for dysuria and hematuria.   Neurological: Negative for dizziness, numbness and seizures.   Psychiatric/Behavioral: Negative for depression and hallucinations. The patient does not have insomnia.        Visit Vitals   • /51 (BP Location: Right arm, Patient Position: Lying)   • Pulse 88   • Temp 97.9 °F (36.6 °C) (Oral)   • Resp 16   • Ht 63\" (160 cm)   • Wt (!) 350 lb (159 kg)   • LMP 01/07/2017 (Exact Date)   • SpO2 94%   • BMI 62 kg/m2        I/O this shift:  In: 480 [P.O.:480]  Out: -     Physical Exam   Constitutional: She appears well-developed and well-nourished.   Obese   oral airway Mallampati class 4   HENT:   Head: Normocephalic and atraumatic.   Nose: No mucosal edema or nasal deformity.   Mouth/Throat: Mucous membranes are normal. No oral lesions. No lacerations. No oropharyngeal exudate.   Eyes: Conjunctivae are normal. Pupils are equal, round, and reactive to light. No scleral icterus. Right pupil is round and reactive. Left pupil is round and reactive. Pupils are equal.   Neck: Neck supple. No tracheal deviation present.   Cardiovascular: Regular rhythm, S1 normal and S2 normal.    No murmur heard.  Pulmonary/Chest: Effort normal and breath sounds normal.   Abdominal: Soft. Normal appearance and bowel sounds are normal. There is no hepatosplenomegaly.   Musculoskeletal: Normal range of motion.   Neurological: She is alert. She has normal reflexes. No cranial nerve deficit or sensory deficit.   Skin: Skin is warm, dry and intact. No cyanosis. Nails show no clubbing.   Psychiatric: She has a normal mood and affect. Her speech is normal and behavior is normal. Judgment and thought content normal. Cognition and memory are normal.         Results from last 7 " days  Lab Units 01/08/17  0453   WBC 10*3/mm3 7.86   HEMOGLOBIN g/dL 11.0*   HEMATOCRIT % 35.4*   PLATELETS 10*3/mm3 299       Results from last 7 days  Lab Units 01/08/17  0453 01/07/17  1721   SODIUM mmol/L 139 140   POTASSIUM mmol/L 3.6 4.1   CHLORIDE mmol/L 102 99   TOTAL CO2 mmol/L 26.8 25.3   BUN mg/dL 6 8   CREATININE mg/dL 0.80 0.73   CALCIUM mg/dL 8.3* 9.1   BILIRUBIN mg/dL  --  0.3   ALK PHOS U/L  --  139*   ALT (SGPT) U/L  --  26   AST (SGOT) U/L  --  21   GLUCOSE mg/dL 126* 102*     CXR/CT:       Assessment and plan:       · Hypoxemia secondary to pulmonary embolism.  Continue oxygen  · Bilateral PE she is on anticoagulation and oncology is also following and workup is ongoing  · DVT  · Obesity  · History of asthma which is stable we will restart her home Symbicort                                                             Claudette Larson MD,[facility]  Pulmonary/Critical Care and Sleep Medicine.  1/8/2017    [employer] Dragon/Transcription disclaimer:   Much of this encounter note is an electronic transcription/translation of spoken language to printed text. The electronic translation of spoken language may permit erroneous, or at times, nonsensical words or phrases to be inadvertently transcribed; Although I have reviewed the note for such errors, some may still exist.

## 2017-01-08 NOTE — PROGRESS NOTES
Bilateral leg venous doppler study preliminary report: right leg negative for dvt.  Positive for calf vein thrombosis left leg.  Called report to Ann Post.

## 2017-01-09 ENCOUNTER — APPOINTMENT (OUTPATIENT)
Dept: CARDIOLOGY | Facility: HOSPITAL | Age: 47
End: 2017-01-09
Attending: INTERNAL MEDICINE

## 2017-01-09 PROBLEM — D50.9 IRON DEFICIENCY ANEMIA: Status: ACTIVE | Noted: 2017-01-09

## 2017-01-09 PROBLEM — E53.8 B12 DEFICIENCY: Status: ACTIVE | Noted: 2017-01-09

## 2017-01-09 LAB
BH CV ECHO MEAS - ACS: 1.9 CM
BH CV ECHO MEAS - AO MEAN PG (FULL): 2 MMHG
BH CV ECHO MEAS - AO MEAN PG: 4 MMHG
BH CV ECHO MEAS - AO V2 MEAN: 94.7 CM/SEC
BH CV ECHO MEAS - AO V2 VTI: 29.6 CM
BH CV ECHO MEAS - ASC AORTA: 2.6 CM
BH CV ECHO MEAS - AVA(I,A): 2.3 CM^2
BH CV ECHO MEAS - AVA(I,D): 2.3 CM^2
BH CV ECHO MEAS - BSA(HAYCOCK): 2.8 M^2
BH CV ECHO MEAS - BSA: 2.5 M^2
BH CV ECHO MEAS - BZI_BMI: 62 KILOGRAMS/M^2
BH CV ECHO MEAS - BZI_METRIC_HEIGHT: 160 CM
BH CV ECHO MEAS - BZI_METRIC_WEIGHT: 158.8 KG
BH CV ECHO MEAS - CONTRAST EF (2CH): 63.3 ML/M^2
BH CV ECHO MEAS - CONTRAST EF 4CH: 63.4 ML/M^2
BH CV ECHO MEAS - EDV(CUBED): 97.3 ML
BH CV ECHO MEAS - EDV(MOD-SP2): 90 ML
BH CV ECHO MEAS - EDV(MOD-SP4): 101 ML
BH CV ECHO MEAS - EDV(TEICH): 97.3 ML
BH CV ECHO MEAS - EF(CUBED): 77.4 %
BH CV ECHO MEAS - EF(MOD-SP2): 63.3 %
BH CV ECHO MEAS - EF(MOD-SP4): 63.4 %
BH CV ECHO MEAS - EF(TEICH): 69.6 %
BH CV ECHO MEAS - ESV(CUBED): 22 ML
BH CV ECHO MEAS - ESV(MOD-SP2): 33 ML
BH CV ECHO MEAS - ESV(MOD-SP4): 37 ML
BH CV ECHO MEAS - ESV(TEICH): 29.6 ML
BH CV ECHO MEAS - FS: 39.1 %
BH CV ECHO MEAS - IVS/LVPW: 0.91
BH CV ECHO MEAS - IVSD: 1 CM
BH CV ECHO MEAS - LAT PEAK E' VEL: 13 CM/SEC
BH CV ECHO MEAS - LV DIASTOLIC VOL/BSA (35-75): 41.2 ML/M^2
BH CV ECHO MEAS - LV MASS(C)D: 169.9 GRAMS
BH CV ECHO MEAS - LV MASS(C)DI: 69.2 GRAMS/M^2
BH CV ECHO MEAS - LV MEAN PG: 2 MMHG
BH CV ECHO MEAS - LV SYSTOLIC VOL/BSA (12-30): 15.1 ML/M^2
BH CV ECHO MEAS - LV V1 MEAN: 67.6 CM/SEC
BH CV ECHO MEAS - LV V1 VTI: 23.9 CM
BH CV ECHO MEAS - LVIDD: 4.6 CM
BH CV ECHO MEAS - LVIDS: 2.8 CM
BH CV ECHO MEAS - LVLD AP2: 7.5 CM
BH CV ECHO MEAS - LVLD AP4: 8.3 CM
BH CV ECHO MEAS - LVLS AP2: 6.4 CM
BH CV ECHO MEAS - LVLS AP4: 6.9 CM
BH CV ECHO MEAS - LVOT AREA (M): 2.8 CM^2
BH CV ECHO MEAS - LVOT AREA: 2.8 CM^2
BH CV ECHO MEAS - LVOT DIAM: 1.9 CM
BH CV ECHO MEAS - LVPWD: 1.1 CM
BH CV ECHO MEAS - MED PEAK E' VEL: 11 CM/SEC
BH CV ECHO MEAS - MR MAX PG: 62.1 MMHG
BH CV ECHO MEAS - MR MAX VEL: 384 CM/SEC
BH CV ECHO MEAS - MV A DUR: 0.14 SEC
BH CV ECHO MEAS - MV A MAX VEL: 80.5 CM/SEC
BH CV ECHO MEAS - MV DEC SLOPE: 360 CM/SEC^2
BH CV ECHO MEAS - MV DEC TIME: 0.22 SEC
BH CV ECHO MEAS - MV E MAX VEL: 100 CM/SEC
BH CV ECHO MEAS - MV E/A: 1.2
BH CV ECHO MEAS - MV MEAN PG: 2 MMHG
BH CV ECHO MEAS - MV P1/2T MAX VEL: 101 CM/SEC
BH CV ECHO MEAS - MV P1/2T: 82.2 MSEC
BH CV ECHO MEAS - MV V2 MEAN: 61 CM/SEC
BH CV ECHO MEAS - MV V2 VTI: 32.1 CM
BH CV ECHO MEAS - MVA P1/2T LCG: 2.2 CM^2
BH CV ECHO MEAS - MVA(P1/2T): 2.7 CM^2
BH CV ECHO MEAS - MVA(VTI): 2.1 CM^2
BH CV ECHO MEAS - PA ACC SLOPE: 20.6 CM/SEC^2
BH CV ECHO MEAS - PA ACC TIME: 0.11 SEC
BH CV ECHO MEAS - PA MAX PG: 4.5 MMHG
BH CV ECHO MEAS - PA PR(ACCEL): 31.3 MMHG
BH CV ECHO MEAS - PA V2 MAX: 106 CM/SEC
BH CV ECHO MEAS - PULM A REVS DUR: 0.11 SEC
BH CV ECHO MEAS - PULM A REVS VEL: 24.7 CM/SEC
BH CV ECHO MEAS - PULM DIAS VEL: 31.6 CM/SEC
BH CV ECHO MEAS - PULM S/D: 1.4
BH CV ECHO MEAS - PULM SYS VEL: 44.7 CM/SEC
BH CV ECHO MEAS - QP/QS: 0.53
BH CV ECHO MEAS - RAP SYSTOLE: 8 MMHG
BH CV ECHO MEAS - RV MEAN PG: 1 MMHG
BH CV ECHO MEAS - RV V1 MEAN: 48.2 CM/SEC
BH CV ECHO MEAS - RV V1 VTI: 17.8 CM
BH CV ECHO MEAS - RVOT AREA: 2 CM^2
BH CV ECHO MEAS - RVOT DIAM: 1.6 CM
BH CV ECHO MEAS - RVSP: 41.6 MMHG
BH CV ECHO MEAS - SI(CUBED): 30.7 ML/M^2
BH CV ECHO MEAS - SI(LVOT): 27.6 ML/M^2
BH CV ECHO MEAS - SI(MOD-SP2): 23.2 ML/M^2
BH CV ECHO MEAS - SI(MOD-SP4): 26.1 ML/M^2
BH CV ECHO MEAS - SI(TEICH): 27.6 ML/M^2
BH CV ECHO MEAS - SV(CUBED): 75.4 ML
BH CV ECHO MEAS - SV(LVOT): 67.8 ML
BH CV ECHO MEAS - SV(MOD-SP2): 57 ML
BH CV ECHO MEAS - SV(MOD-SP4): 64 ML
BH CV ECHO MEAS - SV(RVOT): 35.8 ML
BH CV ECHO MEAS - SV(TEICH): 67.8 ML
BH CV ECHO MEAS - TAPSE (>1.6): 2.3 CM2
BH CV ECHO MEAS - TR MAX VEL: 290 CM/SEC
BH CV XLRA - RV BASE: 3.5 CM
BH CV XLRA - TDI S': 15 CM/SEC
E/E' RATIO: 9
LEFT ATRIUM VOLUME INDEX: 23 ML/M2
LV EF 2D ECHO EST: 64 %
VIT B12 BLD-MCNC: 271 PG/ML (ref 211–946)

## 2017-01-09 PROCEDURE — 82607 VITAMIN B-12: CPT | Performed by: INTERNAL MEDICINE

## 2017-01-09 PROCEDURE — 94799 UNLISTED PULMONARY SVC/PX: CPT

## 2017-01-09 PROCEDURE — 93306 TTE W/DOPPLER COMPLETE: CPT | Performed by: INTERNAL MEDICINE

## 2017-01-09 PROCEDURE — 99233 SBSQ HOSP IP/OBS HIGH 50: CPT | Performed by: INTERNAL MEDICINE

## 2017-01-09 PROCEDURE — 25010000002 ENOXAPARIN PER 10 MG: Performed by: INTERNAL MEDICINE

## 2017-01-09 PROCEDURE — 25010000002 IRON SUCROSE PER 1 MG: Performed by: INTERNAL MEDICINE

## 2017-01-09 PROCEDURE — 93306 TTE W/DOPPLER COMPLETE: CPT

## 2017-01-09 PROCEDURE — 94640 AIRWAY INHALATION TREATMENT: CPT

## 2017-01-09 RX ORDER — FERROUS SULFATE 325(65) MG
325 TABLET ORAL
Status: DISCONTINUED | OUTPATIENT
Start: 2017-01-09 | End: 2017-01-09

## 2017-01-09 RX ORDER — OXYCODONE AND ACETAMINOPHEN 7.5; 325 MG/1; MG/1
1 TABLET ORAL EVERY 8 HOURS PRN
Status: DISCONTINUED | OUTPATIENT
Start: 2017-01-09 | End: 2017-01-11 | Stop reason: HOSPADM

## 2017-01-09 RX ORDER — DOCUSATE SODIUM 100 MG/1
100 CAPSULE, LIQUID FILLED ORAL 2 TIMES DAILY
Status: DISCONTINUED | OUTPATIENT
Start: 2017-01-09 | End: 2017-01-11 | Stop reason: HOSPADM

## 2017-01-09 RX ORDER — CHOLECALCIFEROL (VITAMIN D3) 125 MCG
500 CAPSULE ORAL DAILY
Status: DISCONTINUED | OUTPATIENT
Start: 2017-01-09 | End: 2017-01-11 | Stop reason: HOSPADM

## 2017-01-09 RX ORDER — FAMOTIDINE 10 MG/ML
20 INJECTION, SOLUTION INTRAVENOUS DAILY
Status: COMPLETED | OUTPATIENT
Start: 2017-01-09 | End: 2017-01-10

## 2017-01-09 RX ADMIN — ENOXAPARIN SODIUM 160 MG: 80 INJECTION SUBCUTANEOUS at 13:42

## 2017-01-09 RX ADMIN — ZIPRASIDONE HYDROCHLORIDE 60 MG: 60 CAPSULE ORAL at 22:31

## 2017-01-09 RX ADMIN — IRON SUCROSE 300 MG: 20 INJECTION, SOLUTION INTRAVENOUS at 13:42

## 2017-01-09 RX ADMIN — GABAPENTIN 600 MG: 600 TABLET ORAL at 22:31

## 2017-01-09 RX ADMIN — OXYCODONE HYDROCHLORIDE AND ACETAMINOPHEN 1 TABLET: 5; 325 TABLET ORAL at 05:48

## 2017-01-09 RX ADMIN — BUDESONIDE AND FORMOTEROL FUMARATE DIHYDRATE 2 PUFF: 160; 4.5 AEROSOL RESPIRATORY (INHALATION) at 09:15

## 2017-01-09 RX ADMIN — FAMOTIDINE 20 MG: 10 INJECTION, SOLUTION INTRAVENOUS at 13:42

## 2017-01-09 RX ADMIN — TIZANIDINE 4 MG: 4 TABLET ORAL at 22:31

## 2017-01-09 RX ADMIN — CLONAZEPAM 2 MG: 1 TABLET ORAL at 20:20

## 2017-01-09 RX ADMIN — DEXTROAMPHETAMINE SACCHARATE, AMPHETAMINE ASPARTATE, DEXTROAMPHETAMINE SULFATE AND AMPHETAMINE SULFATE 20 MG: 5; 5; 5; 5 TABLET ORAL at 09:29

## 2017-01-09 RX ADMIN — OXYCODONE HYDROCHLORIDE AND ACETAMINOPHEN 1 TABLET: 7.5; 325 TABLET ORAL at 22:31

## 2017-01-09 RX ADMIN — Medication 500 MCG: at 12:39

## 2017-01-09 RX ADMIN — VILAZODONE HYDROCHLORIDE 40 MG: 40 TABLET ORAL at 09:29

## 2017-01-09 RX ADMIN — GABAPENTIN 600 MG: 600 TABLET ORAL at 14:58

## 2017-01-09 RX ADMIN — TIZANIDINE 4 MG: 4 TABLET ORAL at 14:58

## 2017-01-09 RX ADMIN — BUDESONIDE AND FORMOTEROL FUMARATE DIHYDRATE 2 PUFF: 160; 4.5 AEROSOL RESPIRATORY (INHALATION) at 21:15

## 2017-01-09 RX ADMIN — OXYCODONE HYDROCHLORIDE AND ACETAMINOPHEN 1 TABLET: 7.5; 325 TABLET ORAL at 13:42

## 2017-01-09 RX ADMIN — GABAPENTIN 600 MG: 600 TABLET ORAL at 05:48

## 2017-01-09 RX ADMIN — TIZANIDINE 4 MG: 4 TABLET ORAL at 05:48

## 2017-01-09 RX ADMIN — CLONAZEPAM 2 MG: 1 TABLET ORAL at 09:29

## 2017-01-09 RX ADMIN — ENOXAPARIN SODIUM 160 MG: 80 INJECTION SUBCUTANEOUS at 00:06

## 2017-01-09 NOTE — PLAN OF CARE
Problem: Patient Care Overview (Adult)  Goal: Plan of Care Review  Outcome: Ongoing (interventions implemented as appropriate)    17 0756   Coping/Psychosocial Response Interventions   Plan Of Care Reviewed With patient   Patient Care Overview   Progress improving   Outcome Evaluation   Outcome Summary/Follow up Plan VSS. C/o of pain when breathe but eased up after pain meds. Pt now on room air. Possible d/c tomorrow.        Goal: Adult Individualization and Mutuality  Outcome: Ongoing (interventions implemented as appropriate)  Goal: Discharge Needs Assessment  Outcome: Ongoing (interventions implemented as appropriate)    Problem: Pain, Acute (Adult)  Goal: Acceptable Pain Control/Comfort Level  Outcome: Ongoing (interventions implemented as appropriate)    Problem: Tissue Perfusion, Ineffective Peripheral (Adult)  Goal: Adequate Tissue Perfusion  Outcome: Ongoing (interventions implemented as appropriate)    Problem: Health Knowledge, Opportunity for Enhanced (Adult,NICU,,Obstetrics,Pediatric)  Goal: Knowledgeable about Health Subject/Topic  Outcome: Ongoing (interventions implemented as appropriate)

## 2017-01-09 NOTE — PROGRESS NOTES
LOS: 2 days   Primary Care Physician: Dilma Landeros MD       Subjective    History taken from: patient chart family   No acute events overnight.  Patient reports improved pain on percocet but still significant.  Dyspnea is improving, walking to and from nurses' station.  Denies nausea, vomiting, and diarrhea.      Physical Exam   Constitutional: She is oriented to person, place, and time. No distress.   HENT:   Head: Normocephalic and atraumatic.   Mouth/Throat: Oropharynx is clear and moist.   Eyes: Conjunctivae and EOM are normal. Pupils are equal, round, and reactive to light. No scleral icterus.   Neck: Normal range of motion. Neck supple. No JVD present.   Cardiovascular: Normal rate, regular rhythm and intact distal pulses.    Pulmonary/Chest: Effort normal and breath sounds normal.   Abdominal: Soft. Bowel sounds are normal.   Musculoskeletal: She exhibits edema and tenderness.   Neurological: She is alert and oriented to person, place, and time. She exhibits normal muscle tone.   Skin: Skin is warm and dry. She is not diaphoretic.   Psychiatric: She has a normal mood and affect. Her behavior is normal.   Nursing note and vitals reviewed.      Vital Signs  Body mass index is 62 kg/(m^2).  Temp:  [97.3 °F (36.3 °C)-97.9 °F (36.6 °C)] 97.3 °F (36.3 °C)  Heart Rate:  [68-90] 78  Resp:  [16-20] 16  BP: ()/(51-94) 107/69      Results Review:     I reviewed the patient's new clinical results.  I reviewed the patient's other test results and agree with the interpretation  I personally viewed and interpreted the patient's EKG/Telemetry data      Results from last 7 days  Lab Units 01/08/17  0453 01/07/17  1721   WBC 10*3/mm3 7.86 7.69   HEMOGLOBIN g/dL 11.0* 12.6   PLATELETS 10*3/mm3 299 298       Results from last 7 days  Lab Units 01/08/17  0453 01/07/17  1721   SODIUM mmol/L 139 140   POTASSIUM mmol/L 3.6 4.1   CHLORIDE mmol/L 102 99   TOTAL CO2 mmol/L 26.8 25.3   BUN mg/dL 6 8   CREATININE mg/dL  0.80 0.73   CALCIUM mg/dL 8.3* 9.1   GLUCOSE mg/dL 126* 102*         Hemoglobin A1C:No results found for: HGBA1C    Glucose Range:No results found for: POCGLU    Medication Review: Yes    Physical Therapy:    Assessment/Plan     Active Hospital Problems (** Indicates Principal Problem)    Diagnosis Date Noted   • **Pulmonary embolism without acute cor pulmonale [I26.99] 01/07/2017   • B12 deficiency [E53.8] 01/09/2017   • Iron deficiency anemia [D50.9] 01/09/2017   • Pituitary adenoma [D35.2] 07/27/2016   • Morbid obesity due to excess calories [E66.01] 02/17/2016   • Anxiety [F41.9] 02/15/2016   • Depression [F32.9] 02/15/2016      Resolved Hospital Problems    Diagnosis Date Noted Date Resolved   No resolved problems to display.       Assessment & Plan  Pulmonary embolism with hypoxia  -on therapeutic lovenox, further anticoagulation recommendations per heme/onc  -echo done this AM, follow result  -anticoagulation as long as patient is obese  -will increase percocet for pain  -walking oximetry today  -pulm following, appreciate recs      Anemia  -B12 and iron deficiency, will supplement both  -further recommendations/adjustment per heme/onc      Dispo: Can go home tomorrow if echo okay, pain controlled, and okay with consultants.  Devendra Lucero MD  01/09/17  10:39 AM

## 2017-01-09 NOTE — PROGRESS NOTES
REASON FOR CONSULTATION:   1. Pumonary embolism with tachycardia, morbid obesity is most likely the cause, ; anemia after gastric bypass that needs further work up. Provide an opinion on any further workup or treatment   2.  Anemia in the setting of gastric bypass.     Interval history: She still has some dyspnea especially when she talks.  No cough or hemoptysis.  She is getting Lovenox for anticoagulation.     HISTORY OF PRESENT ILLNESS: The patient is a 46 y.o. year old female who is here for an opinion about the above issue.     History of Present Illness    This 46-year-old white female was brought yesterday to the emergency room by the family, complaining of several days' history of shortness of breath, coughing, and a sensation of palpitations. The patient just got over a very severe upper and lower respiratory infection; I wonder if rhinovirus or respiratory syncytial virus. In any event, at the time of the admission the patient again was tachycardic with no fever. A D-dimer was done that showed elevation and this triggered a CT scan of the chest that documented pulmonary emboli. The patient was anticoagulated with Lovenox and she was transferred to the floor and she is ready to go for Doppler studies of the lower extremities as we speak. The patient denies any swelling in her lower extremities or trauma, erythema, or palpable cords. She has not had any fevers or chills. Her sputum at this time is minimal and her shortness of breath is prominent. She has not had any obvious pleuritic pain or hemoptysis.       The patient's weight remains a problem, very elevated, with a BMI of 65. The patient has not had any modification in her weight lately. She denies any heartburn or indigestion; no difficulty swallowing; no abdominal pain or cramping. Bowel activity has been normal with no passage of blood in the stools. Urination is normal. No vaginal bleeding or discharge. She has continuous pain in her back and in her  knees. She has been seen by pain specialists and local injections have been given. The patient also has treatment for an adenoma of the hypophysis and she goes to the [facility] every 3 months and has an MRI of the hypophysis twice a year and according to her everything is stable.            Medical History         Past Medical History    Diagnosis  Date    •  Anxiety      •  Arthritis      •  Bipolar depression      •  Depression      •  Hyperprolinemia      •  Low back pain      •  Migraine      •  Neuromuscular disorder      •  Obesity      •  Pituitary tumor      •  Pulmonary embolism without acute cor pulmonale  2017        History of MRSA infection, after abdomen surgery      Surgical History          Past Surgical History    Procedure  Laterality  Date    •  Bariatric surgery, has sugar bypass in  at [facility], with complication, followed by 2 episodes of bowels resection no details available        •   section        •  Back surgery        •  Cholecystectomy        •  Dilation and curettage, diagnostic / therapeutic                        Current Outpatient Prescriptions on File Prior to Encounter    Medication  Sig  Dispense  Refill    •  amphetamine-dextroamphetamine (ADDERALL) 20 MG tablet      0    •  cabergoline (DOSTINEX) 0.5 MG tablet  Take 0.25 mg by mouth 2 (two) times a week.        •  clonazePAM (KlonoPIN) 2 MG tablet  Take 2 mg by mouth 2 (Two) Times a Day As Needed.        •  gabapentin (NEURONTIN) 600 MG tablet  TAKE 1 TABLET BY MOUTH THREE TIMES DAILY.  270 tablet  5    •  ibuprofen (ADVIL,MOTRIN) 200 MG tablet  Take 200 mg by mouth Every 6 (Six) Hours As Needed for mild pain (1-3).        •  lisdexamfetamine (VYVANSE) 70 MG capsule  Take 70 mg by mouth every morning.        •  naproxen sodium (ALEVE) 220 MG tablet  Take 220 mg by mouth 2 (Two) Times a Day As Needed for mild pain (1-3).        •  oxyCODONE-acetaminophen (PERCOCET) 5-325 MG per tablet  Take 1 tablet  by mouth 3 (Three) Times a Day As Needed (pain).  90 tablet  0    •  tiZANidine (ZANAFLEX) 4 MG tablet  TAKE 1 TABLET BY MOUTH THREE TIMES DAILY  270 tablet  0    •  VICTOZA 18 MG/3ML solution pen-injector      6    •  vilazodone (VIIBRYD) 40 MG tablet tablet  Take by mouth daily.        •  ziprasidone (GEODON) 60 MG capsule  60 mg 2 (Two) Times a Day.              ALLERGIES:         Allergies    Allergen  Reactions    •  Adhesive Tape      •  Penicillins      •  Sulfa Antibiotics      •  Morphine  Hives and Rash           Social History    Social History             Social History    •  Marital status:          Spouse name:  N/A    •  Number of children:  N/A    •  Years of education:  N/A               Social History Main Topics     •  Smoking status:  Former Smoker     •  Smokeless tobacco:  Never Used     •  Alcohol use  Yes          Comment: socially     •  Drug use:  No     •  Sexual activity:  Defer                          Family History    Problem  Relation  Age of Onset    •  Heart disease  Mother      •  Hypertension  Mother      •  Bipolar disorder  Mother      •  Heart disease  Father      •  Diabetes  Father      •  Alcohol abuse  Brother      •  Asthma  Daughter      •  Alcohol abuse  Brother      •  Bipolar disorder  Brother            Review of Systems    General: no fever, chills,SOME fatigue, NO weight changes, or lack of appetite.  Eyes: no epiphora, xerophthalmia,conjunctivitis, pain, glaucoma, blurred vision, blindness, secretion, photophobia, proptosis, diplopia.  Ears: no otorrhea, tinnitus, otorrhagia, deafness, pain, vertigo.  Nose: no rhinorrhea, epistaxis, alteration in perception of odors, sinuses pressure.  Mouth: no alteration in gums or teeth, ulcers, no difficulty with mastication or deglut ion, no odynophagia.  Neck: no masses or pain, no thyroid alterations, no pain in muscles or arteries, no carotid odynia, no crepitation.  Respiratory: DRY cough,CLEAR sputum  production,SIGNIFICANT dyspnea,NO trepopnea, pleuritic pain, hemoptysis.  Heart: no syncope, irregularity, SIGNIFICANT palpitations,NO angina, orthopnea, paroxysmal nocturnal dyspnea.  Vascular Venous: no tenderness,edema, palpable cords, postphlebitic syndrome, skin changes or ulcerations.  Vascular Arterial: no distal ischemia, claudication, gangrene, neuropathic ischemic pain, skin ulcers, paleness or cyanosis.  GI: no dysphagia, odynophagia, no regurgitation, no heartburn,no indigestion,no nausea,no vomiting,no hematemesis ,no melena,no jaundice,no distention, no obstipation,no enterorrhagia,no proctalgia,no anal lesions, nochanges in bowel habits.  : no frequency, hesitancy, hematuria, discharge, pain.  Musculoskeletal: CHRONIC muscle AND tendon pain or inflammation, joint pain, edema, SOMEfunctional limitation, NO fasciculations, mass.  Neurologic: CHRONIC headache, NO seizures, alterations on Craneal nerves, no motor or senssory deficit, normal coordination, no alteration in memory, orientation, calculation,writting, verbal or written language.  Skin: no rashes, pruritus or localized lesions.  Psychiatric: SIGNIFICANT anxiety, NO depression, agitation, delusions, proper insight.     Objective   Vitals:    01/09/17 0022 01/09/17 0630 01/09/17 0915 01/09/17 0924   BP: 116/69  Comment: recheck bp 107/69     BP Location:  Right arm     Patient Position:  Lying     Pulse: 76  Comment: recheck bp 68 90  Comment: 90 78   Resp:  20 16    Temp:  97.3 °F (36.3 °C)     TempSrc:  Oral     SpO2:  96% 97% 97%   Weight:       Height:          Physical Exam   GENERAL: Well-developed, well-nourished Patient  in no acute distress. OBESE MILD RESPIRATORY DISTRESS  SKIN: Warm, dry without rashes, purpura or petechiae.  HEENT: Pupils were equal, conjunctivas non injected, no pterigion, normal extraocular movements, normal visual acuity.   Mouth mucosa was moist, no exudates in oropharynx, normal papillations of the tongue.Ear  canals were normal, as well tympanic membranes, normal hearing acuity  NECK: Supple with good range of motion; no thyromegaly or masses.   LYMPHATICS: No cervical, supraclavicular adenopathy.  CHEST: Normal excursion of both herve thoraces, normal voice fremitus, no subcutaneous emphysema, normal axillas, no rashes or acanthosis nigricans. Lungs clear to auscultation, normal breath sounds bilaterally, no wheezing, crackles or ronchi.  CARDIAC AND VASCULAR: PMI not displaced,no thrills, FAST rate and regular rhythm, without murmurs, rubs  ABDOMEN: Soft, nontender with no organomegaly or masses, no ascites, no collateral circulation,no distention,no Bernabe sign, no abdominal pain, no inguinal hernias,no umbilical hernias, no abdominal bruits. Normal bowel sounds.  Largest surgical scar in the mid of the abdomen.    GENITAL: Not Performed.  EXTREMITIES AND SPINE: No clubbing, cyanosis or edema, no deformities or pain .No kyphosis, scoliosis, deformities or pain in spine, ribs or pelvic bone.   NEUROLOGICAL: Patient was awake, alert, oriented to time, person and place     RECENT LABS:    Results from last 7 days  Lab Units 01/08/17  0453 01/07/17  1721   WBC 10*3/mm3 7.86 7.69   HEMOGLOBIN g/dL 11.0* 12.6   HEMATOCRIT % 35.4* 38.3   PLATELETS 10*3/mm3 299 298       Results from last 7 days  Lab Units 01/08/17  0453 01/07/17  1721   SODIUM mmol/L 139 140   POTASSIUM mmol/L 3.6 4.1   CHLORIDE mmol/L 102 99   TOTAL CO2 mmol/L 26.8 25.3   BUN mg/dL 6 8   CREATININE mg/dL 0.80 0.73   CALCIUM mg/dL 8.3* 9.1   BILIRUBIN mg/dL  --  0.3   ALK PHOS U/L  --  139*   ALT (SGPT) U/L  --  26   AST (SGOT) U/L  --  21   GLUCOSE mg/dL 126* 102*     Lab Results   Component Value Date    IRON 29 (L) 01/08/2017    TIBC 407 01/08/2017    FERRITIN 15.36 01/08/2017    saturation 7%  Lab Results   Component Value Date    FOLATE 9.56 01/08/2017     Lab Results   Component Value Date    [id number] 271 01/09/2017    C-reactive protein 0.71 mg/dl              Assessment/Plan       1.  In summary, this patient has developed pulmonary embolism. We do believe that the reason why she has developed this is the recent respiratory infection. We will not be surprised if the respiratory infection has triggered a lupus anticoagulant to trigger PE. The other factor on her though is her morbid obesity. As we have stated many times before, obesity per se triggers inflammation and propensity for thrombophilia.       There is no family history of thrombophilia in her parents. Therefore, the need for any other testing in this regard will be limited and I am going to proceed with testing of protein C, protein S with antithrombin III. We ordered prothrombin gene mutation, Factor V Leiden mutation, a lupus anticoagulant and anti-glycoprotein antibodies and C-reactive protein to see the degree of inflammation associated with her morbid obesity.     Hypercoagulable status workup obtained and pending.  Patient currently is on Lovenox.  Discussed with the patient for anticoagulation, I recommended new generation oral anticoagulation, such as a Pradaxa 150 mg twice a day.  We will get the CCP help look into the cost.  She needs long-term anticoagulation.     2.  Anemia with iron deficiency.  This patient had a gastric bypass surgery in 2005.  Previously had IV iron infusion therapy many years ago.  Clearly she has recurrent iron deficiency.  I discussed with the patient and her family members, recommend IV Venofer treatment when she is in the hospital.  As outpatient, we can give Feraheme.  Oral iron supplementation does not work due to malabsorption in patient with history of gastric bypass, remember she also had 2 episodes of bowel resection due to complication from her original gastric bypass.     3.  B12 deficiency syndrome related to her gastric bypass.  Ideally she should've get vitamin B12 injection therapy, however because of anticoagulation, the risk for developing  intramuscular hemorrhage is very high.  I recommended sublingual vitamin B12 supplementation.  She can buy over-the-counter products.  We'll repeat her labs as outpatient in several weeks to see whether there is any improvement.     4.  Per Dr. Mccloud's plan, we make her an appointment to come back to the office in 2-3 weeks to review her laboratory parameters and make any recommendations. As long as she remains obese she will remain on anticoagulants.     In the future when she returns to the office, I am planning as well to proceed with a repeat CT scan of the chest to be sure that the clots are very much resolved and at the same time I will proceed with a CT scan of the abdomen and pelvis to be sure there is no occult malignancy. The patient is up-to-date with mammogram, pelvic examination and colonoscopy. There is no family history of malignancy.       Finally, given her persistent tachycardia at rest in spite of being anticoagulated and having the tendency to listen a right-sided S3 gallop, I am going to pursue an echocardiogram to see if there is any repercussion of pulmonary hypertension in the background of her pulmonary embolism. As I stated, Doppler studies of the lower extremities will be done in the next few minutes.            GRACE GROVE M.D., Ph.D.            Gabrielle disclaimer:  Much of this encounter note is an electronic transcription/translation of spoken language to printed text. The electronic translation of spoken language may permit erroneous, or at times, nonsensical words or phrases to be inadvertently transcribed; Although I have reviewed the note for such errors, some may still exist.

## 2017-01-09 NOTE — PLAN OF CARE
Problem: Patient Care Overview (Adult)  Goal: Plan of Care Review  Outcome: Ongoing (interventions implemented as appropriate)  Goal: Adult Individualization and Mutuality  Outcome: Ongoing (interventions implemented as appropriate)  Goal: Discharge Needs Assessment  Outcome: Ongoing (interventions implemented as appropriate)    Problem: Pain, Acute (Adult)  Goal: Acceptable Pain Control/Comfort Level  Outcome: Ongoing (interventions implemented as appropriate)    Problem: Tissue Perfusion, Ineffective Peripheral (Adult)  Goal: Adequate Tissue Perfusion  Outcome: Ongoing (interventions implemented as appropriate)

## 2017-01-09 NOTE — PROGRESS NOTES
Discharge Planning Assessment  [facility]     Patient Name: Radha Retana  MRN: [id number]  Today's Date: 1/9/2017    Admit Date: 1/7/2017          Discharge Needs Assessment       01/09/17 1346    Living Environment    Lives With spouse    Living Arrangements house    Provides Primary Care For no one, unable/limited ability to care for self    Primary Care Provided By spouse/significant other    Quality Of Family Relationships supportive    Able to Return to Prior Living Arrangements yes    Discharge Needs Assessment    Concerns To Be Addressed no discharge needs identified    Readmission Within The Last 30 Days no previous admission in last 30 days    Anticipated Changes Related to Illness none    Equipment Currently Used at Home none    Equipment Needed After Discharge none            Discharge Plan       01/09/17 1347    Case Management/Social Work Plan    Plan Home no needs    Patient/Family In Agreement With Plan yes    Additional Comments IMM verified 1/8. Met at bedside with pt and her sp. Pt has been independent in all care. Has used HH several years ago unsure of agency. Pt has never been to rehab. Pt has no equipment at home. PCP is Dilma Le, Rx is [facility]. Call to Rx to check cost of Pradaxa will need RX. Call to Silvana lofton to obtain from MD. Per pharmacy pt has Herson PANCHAL, Email to [facility] insurance verification. Demographic information verified. Will chk medicaction cost. CCP will follow to assist as needed,.....dr        Discharge Placement     No information found                Demographic Summary       01/09/17 1339    Referral Information    Admission Type inpatient    Arrived From admitted as an inpatient    Referral Source admission list    Reason For Consult discharge planning    Contact Information    Permission Granted to Share Information With family/designee   spouse Ray    Primary Care Physician Information    Name Tigre, Dilma Rea            Functional Status        01/09/17 1346    Functional Status Current    Ambulation 0-->independent    Transferring 0-->independent    Toileting 0-->independent    Dressing 0-->independent    Eating 0-->independent    Communication 0-->understands/communicates without difficulty    Swallowing (if score 2 or more for any item, consult Rehab Services) 0-->swallows foods/liquids without difficulty    Functional Status Prior    Ambulation 0-->independent    Transferring 0-->independent    Toileting 0-->independent    Bathing 0-->independent    Dressing 0-->independent    Eating 0-->independent    Communication 0-->understands/communicates without difficulty    Swallowing 0-->swallows foods/liquids without difficulty            Psychosocial     None            Abuse/Neglect     None            Legal     None            Substance Abuse     None            Patient Forms     None          Kaitlyn Rutledge, RN

## 2017-01-09 NOTE — PROGRESS NOTES
Continued Stay Note  [facility]     Patient Name: Radha Retana  MRN: [id number]  Today's Date: 1/9/2017    Admit Date: 1/7/2017          Discharge Plan       01/09/17 1525    Case Management/Social Work Plan    Additional Comments Call back from Frederic, cost for pt is $8.25 for 30 day supply of Pradaxa 150mg bid. Pt informed and can afford.....[facility]      01/09/17 1347    Case Management/Social Work Plan    Plan Home no needs    Patient/Family In Agreement With Plan yes    Additional Comments IMM verified 1/8. Met at bedside with pt and her sp. Pt has been independant in all care. Has used HH several years ago unsure of agency. Pt has never been to rehab. Pt has no equipment at home. PCP is Dilma Le, Rx is [facility]eric Bradley. Call to Rx to check cost of Pradaxa will need RX. Call to Silvana lofton to obtain from MD. Per pharmacy pt has Herson PANCHAL, Email to [facility] insurance verification. Demographic information verififed. Will chk medicaction cost. CCP will follow to assist as needed,.....[facility]              Discharge Codes     None            Kaitlyn Rutledge RN

## 2017-01-09 NOTE — PROGRESS NOTES
Exercise Oximetry    Patient Name:Radha Retana   MRN: [id number]   Date: 01/09/17             ROOM AIR BASELINE   SpO2% 96   Heart Rate 119   Blood Pressure      EXERCISE ON ROOM AIR SpO2% EXERCISE ON O2 @  LPM SpO2%   1 MINUTE  97  1 MINUTE    2 MINUTES  99  2 MINUTES    3 MINUTES  96  3 MINUTES    4 MINUTES  4 MINUTES    5 MINUTES  5 MINUTES    6 MINUTES  6 MINUTES               Distance Walked  About 60 ft Distance Walked   Dyspnea (Enrique Scale)   Dyspnea (Enrique Scale)   Fatigue (Enrique Scale)   Fatigue (Enrique Scale)   SpO2% Post Exercise  99 SpO2% Post Exercise   HR Post Exercise  114 HR Post Exercise   Time to Recovery   Time to Recovery     Comments: pt. Can only walk short dis  Hr increaced to 136 while walking

## 2017-01-09 NOTE — PROGRESS NOTES
[facility].  Daily Progress Note.     Radha Retana  46 y.o.  female  1/9/2017    Brief problem (summary)  This is a 46-year-old female who is morbidly obese had a upper respiratory tract infection around Radha time and she was immobile for a week. She developed shortness breath all of a sudden on the day of admission was brought to the emergency room where she was found to have elevated dimers and followed by a CT which shows bilateral pulmonary embolism. She is currently on Lovenox. Oncology has seen the patient and is being worked up for hypercoagulable state. In addition patient also has a history of asthma for which she is takes Symbicort and pro-air. She has been stable at present she is not wheezing but complaints of shortness breath and she is using 2 L of oxygen.      Today, she is walking  Plan for Pradaxa noted, her cost for one month supply is $8  Echo Estimated right ventricular systolic pressure from tricuspid regurgitation is mildly elevated (35-45 mmHg).  PMS/FH/SH: reviewed and unchanged.  Medications:     amphetamine-dextroamphetamine 20 mg Oral Daily   budesonide-formoterol 2 puff Inhalation BID - RT   clonazePAM 2 mg Oral Q12H   docusate sodium 100 mg Oral BID   enoxaparin 1 mg/kg Subcutaneous Q12H   famotidine 20 mg Intravenous Daily   gabapentin 600 mg Oral Q8H   iron sucrose 300 mg Intravenous Daily   tiZANidine 4 mg Oral Q8H   vilazodone 40 mg Oral Daily   vitamin B-12 500 mcg Oral Daily   ziprasidone 60 mg Oral Nightly          ROS:  Respiratory ROS: no cough, shortness of breath, or wheezing    Objective:  Temp:  [97.3 °F (36.3 °C)-98 °F (36.7 °C)] 97.8 °F (36.6 °C)  I/O last 3 completed shifts:  In: 840 [P.O.:840]  Out: -   I/O this shift:  In: 1053.3 [P.O.:800; IV Piggyback:253.3]  Out: -     Physical Exam   Constitutional: She is oriented to person, place, and time. She appears well-developed and well-nourished. No distress.   obese   HENT:   Head: Normocephalic and  atraumatic.   Mallampati class 4   Eyes: Pupils are equal, round, and reactive to light. No scleral icterus.   Cardiovascular: Normal rate and regular rhythm.    Pulmonary/Chest: Effort normal. No respiratory distress. She has no decreased breath sounds. She has no wheezes.   Abdominal: Soft. Bowel sounds are normal. There is no hepatosplenomegaly.   Neurological: She is alert and oriented to person, place, and time.   Skin: No cyanosis. Nails show no clubbing.   Psychiatric: She has a normal mood and affect. Her behavior is normal.           Results from last 7 days  Lab Units 01/08/17  0453 01/07/17  1721   WBC 10*3/mm3 7.86 7.69   HEMOGLOBIN g/dL 11.0* 12.6   HEMATOCRIT % 35.4* 38.3   PLATELETS 10*3/mm3 299 298       Results from last 7 days  Lab Units 01/08/17  0453 01/07/17  1721   SODIUM mmol/L 139 140   POTASSIUM mmol/L 3.6 4.1   CHLORIDE mmol/L 102 99   TOTAL CO2 mmol/L 26.8 25.3   BUN mg/dL 6 8   CREATININE mg/dL 0.80 0.73   GLUCOSE mg/dL 126* 102*   CALCIUM mg/dL 8.3* 9.1               ASSESSMENT/ PLAN:  · Hypoxemia secondary to pulmonary embolism. resolved  · Bilateral PE she is on anticoagulation and oncology is also following and workup is ongoing  · DVT  · Obesity  · History of asthma which is stable we will restart her home Symbicort  · Mild pulmonary hypertension on ECHO 35-45, most likely due to obesity  · Very strongly suspect JULIANNE, she needs sleep study, will arrange a home sleep study as a out patient in few weeks   ·        Claudette Larson MD,[facility]  Pulmonary, Critical Care and Sleep Medicine.  [facility]    1/9/2017    EMR Dragon/Transcription disclaimer:   Much of this encounter note is an electronic transcription/translation of spoken language to printed text. The electronic translation of spoken language may permit erroneous, or at times, nonsensical words or phrases to be inadvertently transcribed; Although I have reviewed the note for such errors, some may  still exist.

## 2017-01-10 ENCOUNTER — APPOINTMENT (OUTPATIENT)
Dept: CARDIOLOGY | Facility: HOSPITAL | Age: 47
End: 2017-01-10
Attending: INTERNAL MEDICINE

## 2017-01-10 LAB
BH CV LOW VAS LEFT PERONEAL VESSEL: 1
BH CV LOWER VASCULAR LEFT COMMON FEMORAL AUGMENT: NORMAL
BH CV LOWER VASCULAR LEFT COMMON FEMORAL COMPETENT: NORMAL
BH CV LOWER VASCULAR LEFT COMMON FEMORAL COMPRESS: NORMAL
BH CV LOWER VASCULAR LEFT COMMON FEMORAL PHASIC: NORMAL
BH CV LOWER VASCULAR LEFT COMMON FEMORAL SPONT: NORMAL
BH CV LOWER VASCULAR LEFT DISTAL FEMORAL COMPRESS: NORMAL
BH CV LOWER VASCULAR LEFT GASTRONEMIUS COMPRESS: NORMAL
BH CV LOWER VASCULAR LEFT GREATER SAPH AK COMPRESS: NORMAL
BH CV LOWER VASCULAR LEFT GREATER SAPH BK COMPRESS: NORMAL
BH CV LOWER VASCULAR LEFT LESSER SAPH COMPRESS: NORMAL
BH CV LOWER VASCULAR LEFT MID FEMORAL AUGMENT: NORMAL
BH CV LOWER VASCULAR LEFT MID FEMORAL COMPETENT: NORMAL
BH CV LOWER VASCULAR LEFT MID FEMORAL COMPRESS: NORMAL
BH CV LOWER VASCULAR LEFT MID FEMORAL PHASIC: NORMAL
BH CV LOWER VASCULAR LEFT MID FEMORAL SPONT: NORMAL
BH CV LOWER VASCULAR LEFT PERONEAL COMPRESS: NORMAL
BH CV LOWER VASCULAR LEFT PERONEAL THROMBUS: NORMAL
BH CV LOWER VASCULAR LEFT POPLITEAL AUGMENT: NORMAL
BH CV LOWER VASCULAR LEFT POPLITEAL COMPETENT: NORMAL
BH CV LOWER VASCULAR LEFT POPLITEAL COMPRESS: NORMAL
BH CV LOWER VASCULAR LEFT POPLITEAL PHASIC: NORMAL
BH CV LOWER VASCULAR LEFT POPLITEAL SPONT: NORMAL
BH CV LOWER VASCULAR LEFT POSTERIOR TIBIAL COMPRESS: NORMAL
BH CV LOWER VASCULAR LEFT PROXIMAL FEMORAL COMPRESS: NORMAL
BH CV LOWER VASCULAR LEFT SAPHENOFEMORAL JUNCTION AUGMENT: NORMAL
BH CV LOWER VASCULAR LEFT SAPHENOFEMORAL JUNCTION COMPETENT: NORMAL
BH CV LOWER VASCULAR LEFT SAPHENOFEMORAL JUNCTION COMPRESS: NORMAL
BH CV LOWER VASCULAR LEFT SAPHENOFEMORAL JUNCTION PHASIC: NORMAL
BH CV LOWER VASCULAR LEFT SAPHENOFEMORAL JUNCTION SPONT: NORMAL
CARDIOLIPIN IGA SER IA-ACNC: <9 APL U/ML (ref 0–11)
CARDIOLIPIN IGG SER IA-ACNC: <9 GPL U/ML (ref 0–14)
CARDIOLIPIN IGM SER IA-ACNC: <9 MPL U/ML (ref 0–12)

## 2017-01-10 PROCEDURE — 94640 AIRWAY INHALATION TREATMENT: CPT

## 2017-01-10 PROCEDURE — 93971 EXTREMITY STUDY: CPT

## 2017-01-10 PROCEDURE — 25010000002 ENOXAPARIN PER 10 MG: Performed by: INTERNAL MEDICINE

## 2017-01-10 PROCEDURE — 94799 UNLISTED PULMONARY SVC/PX: CPT

## 2017-01-10 PROCEDURE — 99232 SBSQ HOSP IP/OBS MODERATE 35: CPT | Performed by: INTERNAL MEDICINE

## 2017-01-10 PROCEDURE — 25010000002 IRON SUCROSE PER 1 MG: Performed by: INTERNAL MEDICINE

## 2017-01-10 RX ORDER — DABIGATRAN ETEXILATE 150 MG/1
150 CAPSULE ORAL EVERY 12 HOURS SCHEDULED
Status: DISCONTINUED | OUTPATIENT
Start: 2017-01-11 | End: 2017-01-11 | Stop reason: HOSPADM

## 2017-01-10 RX ORDER — FAMOTIDINE 10 MG/ML
20 INJECTION, SOLUTION INTRAVENOUS 2 TIMES DAILY
Status: DISCONTINUED | OUTPATIENT
Start: 2017-01-11 | End: 2017-01-11 | Stop reason: HOSPADM

## 2017-01-10 RX ADMIN — CLONAZEPAM 2 MG: 1 TABLET ORAL at 10:50

## 2017-01-10 RX ADMIN — ENOXAPARIN SODIUM 160 MG: 80 INJECTION SUBCUTANEOUS at 00:17

## 2017-01-10 RX ADMIN — OXYCODONE HYDROCHLORIDE AND ACETAMINOPHEN 1 TABLET: 7.5; 325 TABLET ORAL at 14:57

## 2017-01-10 RX ADMIN — TIZANIDINE 4 MG: 4 TABLET ORAL at 21:09

## 2017-01-10 RX ADMIN — BUDESONIDE AND FORMOTEROL FUMARATE DIHYDRATE 2 PUFF: 160; 4.5 AEROSOL RESPIRATORY (INHALATION) at 19:59

## 2017-01-10 RX ADMIN — TIZANIDINE 4 MG: 4 TABLET ORAL at 05:53

## 2017-01-10 RX ADMIN — DEXTROAMPHETAMINE SACCHARATE, AMPHETAMINE ASPARTATE, DEXTROAMPHETAMINE SULFATE AND AMPHETAMINE SULFATE 20 MG: 5; 5; 5; 5 TABLET ORAL at 10:51

## 2017-01-10 RX ADMIN — TIZANIDINE 4 MG: 4 TABLET ORAL at 13:06

## 2017-01-10 RX ADMIN — BUDESONIDE AND FORMOTEROL FUMARATE DIHYDRATE 2 PUFF: 160; 4.5 AEROSOL RESPIRATORY (INHALATION) at 08:28

## 2017-01-10 RX ADMIN — ZIPRASIDONE HYDROCHLORIDE 60 MG: 60 CAPSULE ORAL at 21:09

## 2017-01-10 RX ADMIN — OXYCODONE HYDROCHLORIDE AND ACETAMINOPHEN 1 TABLET: 7.5; 325 TABLET ORAL at 05:55

## 2017-01-10 RX ADMIN — GABAPENTIN 600 MG: 600 TABLET ORAL at 13:06

## 2017-01-10 RX ADMIN — IRON SUCROSE 300 MG: 20 INJECTION, SOLUTION INTRAVENOUS at 13:22

## 2017-01-10 RX ADMIN — VILAZODONE HYDROCHLORIDE 40 MG: 40 TABLET ORAL at 10:51

## 2017-01-10 RX ADMIN — GABAPENTIN 600 MG: 600 TABLET ORAL at 21:09

## 2017-01-10 RX ADMIN — CLONAZEPAM 2 MG: 1 TABLET ORAL at 21:09

## 2017-01-10 RX ADMIN — ENOXAPARIN SODIUM 160 MG: 80 INJECTION SUBCUTANEOUS at 13:22

## 2017-01-10 RX ADMIN — FAMOTIDINE 20 MG: 10 INJECTION, SOLUTION INTRAVENOUS at 10:51

## 2017-01-10 RX ADMIN — GABAPENTIN 600 MG: 600 TABLET ORAL at 05:53

## 2017-01-10 RX ADMIN — Medication 500 MCG: at 10:51

## 2017-01-10 NOTE — PROGRESS NOTES
[facility].  Daily Progress Note.     Radha Retana  46 y.o.  female  1/10/2017    Brief problem (summary)  This is a 46-year-old female who is morbidly obese had a upper respiratory tract infection around Radha time and she was immobile for a week. She developed shortness breath all of a sudden on the day of admission was brought to the emergency room where she was found to have elevated dimers and followed by a CT which shows bilateral pulmonary embolism. She is currently on Lovenox. Oncology has seen the patient and is being worked up for hypercoagulable state. In addition patient also has a history of asthma for which she is takes Symbicort and pro-air. She has been stable at present she is not wheezing but complaints of shortness breath and she is using 2 L of oxygen.      Today, she is walking  Plan for Pradaxa noted, her cost for one month supply is $8  Echo Estimated right ventricular systolic pressure from tricuspid regurgitation is mildly elevated (35-45 mmHg).    PMS/FH/SH: reviewed and unchanged.  Medications:     amphetamine-dextroamphetamine 20 mg Oral Daily   budesonide-formoterol 2 puff Inhalation BID - RT   clonazePAM 2 mg Oral Q12H   [START ON 1/11/2017] dabigatran etexilate 150 mg Oral Q12H   docusate sodium 100 mg Oral BID   enoxaparin 1 mg/kg Subcutaneous Q12H   [START ON 1/11/2017] famotidine 20 mg Intravenous BID   gabapentin 600 mg Oral Q8H   [START ON 1/11/2017] iron sucrose 300 mg Intravenous Once   tiZANidine 4 mg Oral Q8H   vilazodone 40 mg Oral Daily   vitamin B-12 500 mcg Oral Daily   ziprasidone 60 mg Oral Nightly          ROS:  Respiratory ROS: no cough, shortness of breath, or wheezing    Objective:  Temp:  [97.5 °F (36.4 °C)-98.6 °F (37 °C)] 98.6 °F (37 °C)  I/O last 3 completed shifts:  In: 1053.3 [P.O.:800; IV Piggyback:253.3]  Out: -   I/O this shift:  In: 678 [P.O.:360; IV Piggyback:318]  Out: -     Physical Exam   Constitutional: She is oriented to person,  place, and time. She appears well-developed and well-nourished. No distress.   obese   HENT:   Head: Normocephalic and atraumatic.   Mallampati class 4   Eyes: Pupils are equal, round, and reactive to light. No scleral icterus.   Cardiovascular: Normal rate and regular rhythm.    Pulmonary/Chest: Effort normal. No respiratory distress. She has no decreased breath sounds. She has no wheezes.   Abdominal: Soft. Bowel sounds are normal. There is no hepatosplenomegaly.   Neurological: She is alert and oriented to person, place, and time.   Skin: No cyanosis. Nails show no clubbing.   Psychiatric: She has a normal mood and affect. Her behavior is normal.           Results from last 7 days  Lab Units 01/08/17  0453 01/07/17  1721   WBC 10*3/mm3 7.86 7.69   HEMOGLOBIN g/dL 11.0* 12.6   HEMATOCRIT % 35.4* 38.3   PLATELETS 10*3/mm3 299 298       Results from last 7 days  Lab Units 01/08/17  0453 01/07/17  1721   SODIUM mmol/L 139 140   POTASSIUM mmol/L 3.6 4.1   CHLORIDE mmol/L 102 99   TOTAL CO2 mmol/L 26.8 25.3   BUN mg/dL 6 8   CREATININE mg/dL 0.80 0.73   GLUCOSE mg/dL 126* 102*   CALCIUM mg/dL 8.3* 9.1               ASSESSMENT/ PLAN:  · Hypoxemia secondary to pulmonary embolism. resolved  · Bilateral PE she is on anticoagulation and oncology is also following and workup is ongoing  · DVT  · Obesity  · History of asthma which is stable we will restart her home Symbicort  · Mild pulmonary hypertension on ECHO 35-45, most likely due to obesity  · Very strongly suspect JULIANNE, she needs sleep study, will arrange a home sleep study as a out patient in few weeks     OK to discharge from pulmonary stand point  Her sleep study will be done in 2 weeks and my office will make follow up appointment        Claudette Larson MD,[facility]  Pulmonary, Critical Care and Sleep Medicine.  [facility]    1/10/2017    EMR Dragon/Transcription disclaimer:   Much of this encounter note is an electronic  transcription/translation of spoken language to printed text. The electronic translation of spoken language may permit erroneous, or at times, nonsensical words or phrases to be inadvertently transcribed; Although I have reviewed the note for such errors, some may still exist.

## 2017-01-10 NOTE — PROGRESS NOTES
Lt lower extremity venous doppler completed, Prelim given to MADELEINE Juarez of no change for calf vein thrombus

## 2017-01-10 NOTE — PROGRESS NOTES
LOS: 3 days   Primary Care Physician: Dilma Landeros MD       Subjective    History taken from: patient chart family   No acute events overnight.  Patient reports improved pain with increased dose of percocet.  Has some bilateral calf cramps.  Dyspnea is improving, walking to and from nurses' station.  Denies nausea, vomiting, and diarrhea.      Physical Exam   Constitutional: She is oriented to person, place, and time. No distress.   HENT:   Head: Normocephalic and atraumatic.   Mouth/Throat: Oropharynx is clear and moist.   Eyes: Conjunctivae and EOM are normal. Pupils are equal, round, and reactive to light. No scleral icterus.   Neck: Normal range of motion. Neck supple. No JVD present.   Cardiovascular: Normal rate, regular rhythm and intact distal pulses.    Pulmonary/Chest: Effort normal and breath sounds normal.   Abdominal: Soft. Bowel sounds are normal.   Musculoskeletal: She exhibits edema and tenderness.   Neurological: She is alert and oriented to person, place, and time. She exhibits normal muscle tone.   Skin: Skin is warm and dry. She is not diaphoretic.   Psychiatric: She has a normal mood and affect. Her behavior is normal.   Nursing note and vitals reviewed.      Vital Signs  Body mass index is 62 kg/(m^2).  Temp:  [97.5 °F (36.4 °C)-98 °F (36.7 °C)] 97.8 °F (36.6 °C)  Heart Rate:  [72-91] 72  Resp:  [18-20] 18  BP: (101-115)/(47-73) 115/58      Results Review:     I reviewed the patient's new clinical results.  I reviewed the patient's other test results and agree with the interpretation  I personally viewed and interpreted the patient's EKG/Telemetry data      Results from last 7 days  Lab Units 01/08/17  0453 01/07/17  1721   WBC 10*3/mm3 7.86 7.69   HEMOGLOBIN g/dL 11.0* 12.6   PLATELETS 10*3/mm3 299 298       Results from last 7 days  Lab Units 01/08/17  0453 01/07/17  1721   SODIUM mmol/L 139 140   POTASSIUM mmol/L 3.6 4.1   CHLORIDE mmol/L 102 99   TOTAL CO2 mmol/L 26.8 25.3   BUN  mg/dL 6 8   CREATININE mg/dL 0.80 0.73   CALCIUM mg/dL 8.3* 9.1   GLUCOSE mg/dL 126* 102*         Hemoglobin A1C:No results found for: HGBA1C    Glucose Range:No results found for: POCGLU    Medication Review: Yes    Physical Therapy:    Assessment/Plan     Active Hospital Problems (** Indicates Principal Problem)    Diagnosis Date Noted   • **Pulmonary embolism without acute cor pulmonale [I26.99] 01/07/2017   • B12 deficiency [E53.8] 01/09/2017   • Iron deficiency anemia [D50.9] 01/09/2017   • Pituitary adenoma [D35.2] 07/27/2016   • Morbid obesity due to excess calories [E66.01] 02/17/2016   • Anxiety [F41.9] 02/15/2016   • Depression [F32.9] 02/15/2016      Resolved Hospital Problems    Diagnosis Date Noted Date Resolved   No resolved problems to display.       Assessment & Plan  Pulmonary embolism with hypoxia  -on therapeutic lovenox, heme/onc and pulm recommending pradaxa  -echo done this AM, follow result  -anticoagulation as long as patient is obese  -will increase percocet for pain  -no desat on walking oximetry  -pulm following, appreciate recs-set up sleep study as outpatient for likely JULIANNE  -echo results noted      Anemia  -B12 and iron deficiency  -SL B12 and IV iron per heme/onc; history of gastric bypass and subsequent 2 bowel resections due to complications        Dispo: Can go home in AM after receiving IV iron if okay with pulm and heme/onc.  Devendra Lucero MD  01/10/17  12:17 PM

## 2017-01-10 NOTE — PROGRESS NOTES
REASON FOR FOLLOW-UP:   1. Pumonary embolism with tachycardia, morbid obesity is most likely the cause, ; anemia after gastric bypass that needs further work up. Provide an opinion on any further workup or treatment   2.  Anemia in the setting of gastric bypass.  Iron deficiency, vitamin B12 deficiency.  Patient was started on Venofer 300 mg on 01/09/2017, will be repeated for 3 times as inpatient.     Interval history: She still has some dyspnea especially when she talks, but overall better. No cough or hemoptysis. She is getting Lovenox for anticoagulation.  She was also started on Vantin for on 01/09/2017, no allergic reaction.  In the morning of 01/10/2017, she still has dyspnea, and also complains of pain in the left leg.  There is no edema.      HISTORY OF PRESENT ILLNESS: The patient is a 46 y.o. year old female who is here for an opinion about the above issue.      History of Present Illness   This 46-year-old white female was brought yesterday to the emergency room by the family, complaining of several days' history of shortness of breath, coughing, and a sensation of palpitations. The patient just got over a very severe upper and lower respiratory infection; I wonder if rhinovirus or respiratory syncytial virus. In any event, at the time of the admission the patient again was tachycardic with no fever. A D-dimer was done that showed elevation and this triggered a CT scan of the chest that documented pulmonary emboli. The patient was anticoagulated with Lovenox and she was transferred to the floor and she is ready to go for Doppler studies of the lower extremities as we speak. The patient denies any swelling in her lower extremities or trauma, erythema, or palpable cords. She has not had any fevers or chills. Her sputum at this time is minimal and her shortness of breath is prominent. She has not had any obvious pleuritic pain or hemoptysis.        The patient's weight remains a problem, very elevated, with  a BMI of 65. The patient has not had any modification in her weight lately. She denies any heartburn or indigestion; no difficulty swallowing; no abdominal pain or cramping. Bowel activity has been normal with no passage of blood in the stools. Urination is normal. No vaginal bleeding or discharge. She has continuous pain in her back and in her knees. She has been seen by pain specialists and local injections have been given. The patient also has treatment for an adenoma of the hypophysis and she goes to the [facility] every 3 months and has an MRI of the hypophysis twice a year and according to her everything is stable.              Medical History             Past Medical History    Diagnosis  Date    •  Anxiety       •  Arthritis       •  Bipolar depression       •  Depression       •  Hyperprolinemia       •  Low back pain       •  Migraine       •  Neuromuscular disorder       •  Obesity       •  Pituitary tumor       •  Pulmonary embolism without acute cor pulmonale  2017        History of MRSA infection, after abdomen surgery             Surgical History               Past Surgical History    Procedure  Laterality  Date    •  Bariatric surgery, has sugar bypass in  at [facility], with complication, followed by 2 episodes of bowels resection no details available          •   section          •  Back surgery          •  Cholecystectomy          •  Dilation and curettage, diagnostic / therapeutic                  Medication.  Reviewed and is see EMR records.         ALLERGIES:            Allergies    Allergen  Reactions    •  Adhesive Tape       •  Penicillins       •  Sulfa Antibiotics       •  Morphine  Hives and Rash                   Social History     Social History                  Social History    •  Marital status:            Spouse name:  N/A    •  Number of children:  N/A    •  Years of education:  N/A                      Social History Main Topics      •  Smoking  status:  Former Smoker      •  Smokeless tobacco:  Never Used      •  Alcohol use  Yes             Comment: socially      •  Drug use:  No      •  Sexual activity:  Defer                                   Family History    Problem  Relation  Age of Onset    •  Heart disease  Mother       •  Hypertension  Mother       •  Bipolar disorder  Mother       •  Heart disease  Father       •  Diabetes  Father       •  Alcohol abuse  Brother       •  Asthma  Daughter       •  Alcohol abuse  Brother       •  Bipolar disorder  Brother               Review of Systems    General: no fever, chills, some fatigue, NO weight changes, or lack of appetite.  Eyes: no epiphora, xerophthalmia,conjunctivitis, pain, glaucoma, blurred vision, blindness, secretion, photophobia, proptosis, diplopia.  Ears: no otorrhea, tinnitus, otorrhagia, deafness, pain, vertigo.  Nose: no rhinorrhea, epistaxis, alteration in perception of odors, sinuses pressure.  Mouth: no alteration in gums or teeth, ulcers, no difficulty with mastication or deglut ion, no odynophagia.  Neck: no masses or pain, no thyroid alterations, no pain in muscles or arteries,   Respiratory: See history of present illness   Heart: no syncope, irregularity, palpitations resolved, NO angina, orthopnea, paroxysmal nocturnal dyspnea.  Vascular Venous: no tenderness,edema, palpable cords, postphlebitic syndrome, skin changes or ulcerations.  Vascular Arterial: no distal ischemia, claudication, gangrene, neuropathic ischemic pain, skin ulcers, paleness or cyanosis.  GI: no dysphagia, odynophagia, no regurgitation, no heartburn,no indigestion,no nausea,no vomiting,no hematemesis ,no melena,no jaundice,no distention, no obstipation,no enterorrhagia,no proctalgia,no anal lesions, nochanges in bowel habits.  : no frequency, hesitancy, hematuria, discharge, pain.  Musculoskeletal: CHRONIC muscle AND tendon pain or inflammation, joint pain, edema, SOMEfunctional limitation, NO fasciculations,  mass.  Neurologic: CHRONIC headache, NO seizures, alterations on Craneal nerves, no motor or senssory deficit, normal coordination, no alteration in memory, orientation, calculation,writting, verbal or written language.  Skin: no rashes, pruritus or localized lesions.  Psychiatric: SIGNIFICANT anxiety, NO depression, agitation, delusions, proper insight.      Objective   Vitals:    01/09/17 1345 01/09/17 1552 01/09/17 2300 01/10/17 0500   BP: 108/73 111/62 101/47 115/58   BP Location: Right arm Right arm Right arm Right arm   Patient Position: Lying Lying Lying Lying   Pulse: 87 77 79 72   Resp:   18 18   Temp:  97.8 °F (36.6 °C) 97.5 °F (36.4 °C) 97.8 °F (36.6 °C)   TempSrc:  Oral Oral Oral   SpO2: 94% 90% 92% 92%   Weight:       Height:          Physical Exam   GENERAL: Well-developed, well-nourished Patient in no acute distress. OBESE MILD RESPIRATORY DISTRESS  SKIN: Warm, dry without rashes, purpura or petechiae.  HEENT: Pupils were equal, conjunctivas non injected, no pterigion, normal extraocular movements, normal visual acuity.   Mouth mucosa was moist, no exudates in oropharynx, normal papillations of the tongue.Ear canals were normal, as well tympanic membranes, normal hearing acuity  NECK: Supple with good range of motion; no thyromegaly or masses.   LYMPHATICS: No cervical, supraclavicular adenopathy.  CHEST: Normal excursion of both herve thoraces, normal voice fremitus, no subcutaneous emphysema, normal axillas, no rashes or acanthosis nigricans. Lungs clear to auscultation, normal breath sounds bilaterally, no wheezing, crackles or ronchi.  CARDIAC AND VASCULAR: PMI not displaced,no thrills, regular rate and rhythm, without murmurs, rubs  ABDOMEN: Soft, nontender with no organomegaly or masses, no ascites, no abdominal pain, no inguinal hernias,no umbilical hernias, no abdominal bruits. Normal bowel sounds.  Largest surgical scar in the mid of the abdomen.   GENITAL: Not Performed.  EXTREMITIES AND  SPINE: No clubbing, cyanosis or edema, no deformities or pain .No kyphosis, scoliosis, deformities or pain in spine, ribs or pelvic bone.   NEUROLOGICAL: Patient was awake, alert, oriented to time, person and place      RECENT LABS:       Results from last 7 days  Lab Units 01/08/17  0453 01/07/17  1721   WBC 10*3/mm3 7.86 7.69   HEMOGLOBIN g/dL 11.0* 12.6   HEMATOCRIT % 35.4* 38.3   PLATELETS 10*3/mm3 299 298          Results from last 7 days  Lab Units 01/08/17  0453 01/07/17  1721   SODIUM mmol/L 139 140   POTASSIUM mmol/L 3.6 4.1   CHLORIDE mmol/L 102 99   TOTAL CO2 mmol/L 26.8 25.3   BUN mg/dL 6 8   CREATININE mg/dL 0.80 0.73   CALCIUM mg/dL 8.3* 9.1   BILIRUBIN mg/dL  --  0.3   ALK PHOS U/L  --  139*   ALT (SGPT) U/L  --  26   AST (SGOT) U/L  --  21   GLUCOSE mg/dL 126* 102*           Lab Results   Component Value Date     IRON 29 (L) 01/08/2017     TIBC 407 01/08/2017     FERRITIN 15.36 01/08/2017    saturation 7%        Lab Results   Component Value Date     FOLATE 9.56 01/08/2017            Lab Results   Component Value Date     VTCKDUSE99 271 01/09/2017    C-reactive protein 0.71 mg/dl       ECHO on 1/09/17    Interpretation Summary   · All left ventricular wall segments contract normally.  · Left ventricular function is normal. Estimated EF = 64%.  · Right ventricular cavity is mildly dilated.  · Trace tricuspid valve regurgitation is present. Estimated right ventricular systolic pressure from tricuspid regurgitation is mildly elevated (35-45 mmHg).           Assessment/Plan       1. In summary, this patient has developed pulmonary embolism. We do believe that the reason why she has developed this is the recent respiratory infection. We will not be surprised if the respiratory infection has triggered a lupus anticoagulant to trigger PE. The other factor on her though is her morbid obesity. As we have stated many times before, obesity per se triggers inflammation and propensity for thrombophilia.      The  patient reports she has worsening pain in the left leg.  Her most recent Doppler study was on 01/08/2017.  I will repeat her Doppler study today for reassessment.     Her tachycardia has resolved.  Her echocardiogram study on 01/09/2017 reported normal LVEF 64%.  There was no significant abnormalities.       There is no family history of thrombophilia in her parents.  Laboratory examination with testing of protein C, protein S with antithrombin III. We ordered prothrombin gene mutation, Factor V Leiden mutation, lupus anticoagulant and anti-glycoprotein antibodies are still pending.  C-reactive protein was marginally elevated at 0.71 on 01/09/2017, likely of inflammation associated with her morbid obesity.      Hypercoagulable status workup obtained and pending. Patient currently is on Lovenox. Discussed with the patient for anticoagulation, I recommended new generation oral anticoagulation, such as a Pradaxa 150 mg twice a day.  [facility] staff checked the cost for her monthly supply which is only less than $10.  We'll start patient on Pradaxa from 11/11/2017.  She is being on Lovenox since 01/08/2017.   She needs long-term anticoagulation.      2.  Anemia with iron deficiency. This is secondary gastric bypass surgery in 2005 with a poor oral iron absorption. Previously had IV iron infusion therapy many years ago. Clearly she has recurrent iron deficiency.  She was started on IV Venofer 300 mg on 01/09/2017.  Plan to have 3 doses as inpatient so she likely will stay at least till 01/11/2017.  I discussed with the patient and her family members, recommend IV Venofer treatment when she is in the hospital. As outpatient, we can give Feraheme. Oral iron supplementation does not work due to malabsorption in patient with history of gastric bypass.  Remember she also had 2 episodes of bowel resection due to complication from her original gastric bypass.      3. B12 deficiency syndrome related to her gastric bypass. Ideally she  should've get vitamin B12 injection therapy, however because of anticoagulation, the risk for developing intramuscular hemorrhage is very high. I recommended sublingual vitamin B12 supplementation as outpatient. She can buy over-the-counter products. We'll repeat her labs as outpatient in several weeks to see whether there is any improvement.      4.  Patient already has appointment with Dr. Mccloud on 01/31/2017, at 2:30 PM with labs and to see physician afterwards.        In the future when she returns to the office, Dr. Mccloud plans to proceed with a repeat CT scan of the chest to be sure that the clots are very much resolved and at the same time I will proceed with a CT scan of the abdomen and pelvis to be sure there is no occult malignancy. The patient is up-to-date with mammogram, pelvic examination and colonoscopy. There is no family history of malignancy.          PLAN.  1.  Continue Lovenox subcutaneous every 12 hours today.  Switched to Pradaxa 150 mg oral twice a day on 01/11/2017.  2.  Continue IV Venofer second dose today.  I added a third dose for 01/11/2017.  Cancer her outpatient on infusion scheduled on January 16.   3.  Repeated left leg Doppler study for reassessment due to worsening leg pain.  4.  Continue oral vitamin B12 supplementation.  Was discharged, by over-the-counter sublingual B12.  5.  Keep appointment with Dr. Trujillo as scheduled on 01/01/2017.    40 minutes, over half of that time counseling.   I will sign off now.  Call if needed.            GRACE GROVE M.D., Ph.D.    01/10/2017            Dragon disclaimer:  Much of this encounter note is an electronic transcription/translation of spoken language to printed text. The electronic translation of spoken language may permit erroneous, or at times, nonsensical words or phrases to be inadvertently transcribed; Although I have reviewed the note for such errors, some may still exist.

## 2017-01-10 NOTE — PLAN OF CARE
Problem: Patient Care Overview (Adult)  Goal: Plan of Care Review  Outcome: Outcome(s) achieved Date Met:  01/10/17    01/10/17 0753   Coping/Psychosocial Response Interventions   Plan Of Care Reviewed With patient   Patient Care Overview   Progress improving       Goal: Adult Individualization and Mutuality  Outcome: Outcome(s) achieved Date Met:  01/10/17  Goal: Discharge Needs Assessment  Outcome: Outcome(s) achieved Date Met:  01/10/17    Problem: Pain, Acute (Adult)  Goal: Acceptable Pain Control/Comfort Level  Outcome: Outcome(s) achieved Date Met:  01/10/17    Problem: Tissue Perfusion, Ineffective Peripheral (Adult)  Goal: Adequate Tissue Perfusion  Outcome: Outcome(s) achieved Date Met:  01/10/17    Problem: Health Knowledge, Opportunity for Enhanced (Adult,NICU,[place],Obstetrics,Pediatric)  Goal: Knowledgeable about Health Subject/Topic  Outcome: Outcome(s) achieved Date Met:  01/10/17

## 2017-01-11 VITALS
OXYGEN SATURATION: 91 % | HEIGHT: 63 IN | WEIGHT: 293 LBS | BODY MASS INDEX: 51.91 KG/M2 | TEMPERATURE: 97.8 F | HEART RATE: 71 BPM | DIASTOLIC BLOOD PRESSURE: 76 MMHG | SYSTOLIC BLOOD PRESSURE: 111 MMHG | RESPIRATION RATE: 20 BRPM

## 2017-01-11 PROBLEM — J96.01 ACUTE RESPIRATORY FAILURE WITH HYPOXIA (HCC): Status: ACTIVE | Noted: 2017-01-11

## 2017-01-11 PROBLEM — J96.01 ACUTE RESPIRATORY FAILURE WITH HYPOXIA (HCC): Status: RESOLVED | Noted: 2017-01-11 | Resolved: 2017-01-11

## 2017-01-11 LAB
B2 GLYCOPROT1 IGA SER-ACNC: <9 GPI IGA UNITS (ref 0–25)
B2 GLYCOPROT1 IGG SER-ACNC: <9 GPI IGG UNITS (ref 0–20)
B2 GLYCOPROT1 IGM SER-ACNC: <9 GPI IGM UNITS (ref 0–32)
LA NT DPL PPP: 39.5 SEC (ref 0–55)
LA NT DPL/LA NT HPL PPP-RTO: 1.08 RATIO (ref 0–1.4)
LA NT PLATELET PPP: 35.5 SEC (ref 0–40.6)
LUPUS ANTICOAGULANT REFLEX: NORMAL
SCREEN DRVVT: 40.9 SEC (ref 0–44)
THROMBIN TIME: 14.1 SEC (ref 0–20.9)

## 2017-01-11 PROCEDURE — 25010000002 ENOXAPARIN PER 10 MG: Performed by: INTERNAL MEDICINE

## 2017-01-11 PROCEDURE — 25010000002 IRON SUCROSE PER 1 MG: Performed by: INTERNAL MEDICINE

## 2017-01-11 RX ORDER — OXYCODONE AND ACETAMINOPHEN 7.5; 325 MG/1; MG/1
1 TABLET ORAL EVERY 8 HOURS PRN
Qty: 30 TABLET | Refills: 0 | Status: SHIPPED | OUTPATIENT
Start: 2017-01-11 | End: 2017-01-19 | Stop reason: SDUPTHER

## 2017-01-11 RX ORDER — DABIGATRAN ETEXILATE 150 MG/1
150 CAPSULE ORAL EVERY 12 HOURS SCHEDULED
Qty: 60 CAPSULE | Refills: 0 | Status: SHIPPED | OUTPATIENT
Start: 2017-01-11 | End: 2017-10-26 | Stop reason: HOSPADM

## 2017-01-11 RX ORDER — ALBUTEROL SULFATE 90 UG/1
2 AEROSOL, METERED RESPIRATORY (INHALATION) EVERY 4 HOURS PRN
Qty: 1 INHALER | Refills: 0 | Status: SHIPPED | OUTPATIENT
Start: 2017-01-11 | End: 2017-01-31

## 2017-01-11 RX ORDER — PSEUDOEPHEDRINE HCL 30 MG
100 TABLET ORAL 2 TIMES DAILY
Qty: 60 EACH | Refills: 0 | Status: SHIPPED | OUTPATIENT
Start: 2017-01-11 | End: 2017-01-31

## 2017-01-11 RX ORDER — BUDESONIDE AND FORMOTEROL FUMARATE DIHYDRATE 160; 4.5 UG/1; UG/1
2 AEROSOL RESPIRATORY (INHALATION)
Qty: 1 INHALER | Refills: 0 | Status: SHIPPED | OUTPATIENT
Start: 2017-01-11 | End: 2019-07-03

## 2017-01-11 RX ADMIN — CLONAZEPAM 2 MG: 1 TABLET ORAL at 10:03

## 2017-01-11 RX ADMIN — BUDESONIDE AND FORMOTEROL FUMARATE DIHYDRATE 2 PUFF: 160; 4.5 AEROSOL RESPIRATORY (INHALATION) at 11:12

## 2017-01-11 RX ADMIN — FAMOTIDINE 20 MG: 10 INJECTION, SOLUTION INTRAVENOUS at 10:04

## 2017-01-11 RX ADMIN — GABAPENTIN 600 MG: 600 TABLET ORAL at 05:55

## 2017-01-11 RX ADMIN — TIZANIDINE 4 MG: 4 TABLET ORAL at 05:55

## 2017-01-11 RX ADMIN — OXYCODONE HYDROCHLORIDE AND ACETAMINOPHEN 1 TABLET: 7.5; 325 TABLET ORAL at 05:55

## 2017-01-11 RX ADMIN — DABIGATRAN ETEXILATE MESYLATE 150 MG: 150 CAPSULE ORAL at 10:03

## 2017-01-11 RX ADMIN — DEXTROAMPHETAMINE SACCHARATE, AMPHETAMINE ASPARTATE, DEXTROAMPHETAMINE SULFATE AND AMPHETAMINE SULFATE 20 MG: 5; 5; 5; 5 TABLET ORAL at 10:03

## 2017-01-11 RX ADMIN — VILAZODONE HYDROCHLORIDE 40 MG: 40 TABLET ORAL at 10:04

## 2017-01-11 RX ADMIN — Medication 500 MCG: at 10:03

## 2017-01-11 RX ADMIN — ENOXAPARIN SODIUM 160 MG: 80 INJECTION SUBCUTANEOUS at 00:34

## 2017-01-11 RX ADMIN — IRON SUCROSE 300 MG: 20 INJECTION, SOLUTION INTRAVENOUS at 10:03

## 2017-01-11 NOTE — PLAN OF CARE
Problem: Patient Care Overview (Adult)  Goal: Plan of Care Review  Outcome: Ongoing (interventions implemented as appropriate)    17 1054   Coping/Psychosocial Response Interventions   Plan Of Care Reviewed With patient;daughter   Patient Care Overview   Progress improving   Outcome Evaluation   Outcome Summary/Follow up Plan DC home once venofer infusion complete, VSS, pt denies complaints; states ready to go       Goal: Adult Individualization and Mutuality  Outcome: Ongoing (interventions implemented as appropriate)  Goal: Discharge Needs Assessment  Outcome: Ongoing (interventions implemented as appropriate)    Problem: Pain, Acute (Adult)  Goal: Identify Related Risk Factors and Signs and Symptoms  Outcome: Ongoing (interventions implemented as appropriate)  Goal: Acceptable Pain Control/Comfort Level  Outcome: Ongoing (interventions implemented as appropriate)    Problem: Health Knowledge, Opportunity for Enhanced (Adult,NICU,[place],Obstetrics,Pediatric)  Goal: Knowledgeable about Health Subject/Topic  Outcome: Ongoing (interventions implemented as appropriate)

## 2017-01-11 NOTE — DISCHARGE INSTRUCTIONS
• Dr. Mccloud: See him in the office in 2-3 weeks to review her laboratory parameters and make any recommendations. In the future when she returns to the office, I am planning as well to proceed with a repeat CT scan of the chest to be sure that the clots are very much resolved.

## 2017-01-11 NOTE — DISCHARGE SUMMARY
Date of Admission: 1/7/2017  Date of Discharge:  1/11/2017  Primary Care Physician: Dilma Landeros MD     Discharge Diagnosis:  Active Hospital Problems (** Indicates Principal Problem)    Diagnosis Date Noted   • **Pulmonary embolism without acute cor pulmonale [I26.99] 01/07/2017   • B12 deficiency [E53.8] 01/09/2017   • Iron deficiency anemia [D50.9] 01/09/2017   • Pituitary adenoma [D35.2] 07/27/2016   • Morbid obesity due to excess calories [E66.01] 02/17/2016   • Anxiety [F41.9] 02/15/2016   • Depression [F32.9] 02/15/2016      Resolved Hospital Problems    Diagnosis Date Noted Date Resolved   No resolved problems to display.       Presenting Problem/History of Present Illness  Other acute pulmonary embolism without acute cor pulmonale [I26.99]  Pneumonia of both lungs due to infectious organism, unspecified part of lung [J18.9]     Hospital Course  Patient is a 46 y.o. female who presented with acute onset shortness of breath following a respiratory viral illness.  She was found to have a left lower extremity DVT and bilateral pulmonary emboli.  This was complicated by morbid obesity, therefore pulmonology and oncology were consulted.  It was recommended to start pradaxa and continue this indefinitely for as long as the patient remains obese. Her symptoms did improve and she did not significantly drop her oxygen saturations on her 6 minute walk. Pulmonology started symbicort and albuterol for history of asthma as she now has reduced reserve dur to PE.  She is to follow up with Dr. Mccloud with heme onc.  A pulmonology follow up with a sleep study was also scheduled as she likely has JULIANNE.  She was also found to have anemia due to iron and B12 deficiencies.  Given her history of gastric bypass and partial bowel resection, she was given 3 doses of IV venofer  (300mg/d x3days) instead of PO iron.  She was discharged on PO B12.    Regarding her diabetes mellitus, her home victoza was held in part due to case  "reports of associated DVT on this medication.  Her blood glucose levels were acceptable without specific pharmacologic therapy, in the low 100s to 120s.  She was not discharged on an anti-hyperglycemic, and counseled on lifestyle modifications.  This is to be followed by her PCP.    Physical Examination on the day of discharge:  Blood pressure 111/76, pulse 71, temperature 97.8 °F (36.6 °C), temperature source Oral, resp. rate 20, height 63\" (160 cm), weight (!) 350 lb (159 kg), last menstrual period 01/07/2017, SpO2 91 %.  Constitutional: She is oriented to person, place, and time. No distress.   Head: Normocephalic and atraumatic.   Mouth/Throat: Oropharynx is clear and moist.   Eyes: Conjunctivae and EOM are normal. Pupils are equal, round, and reactive to light. No scleral icterus.   Neck: Normal range of motion. Neck supple. No JVD present.   Cardiovascular: Normal rate, regular rhythm and intact distal pulses.   Pulmonary/Chest: Effort normal and breath sounds normal.   Abdominal: Soft. Bowel sounds are normal.   Musculoskeletal: She exhibits edema and tenderness.   Neurological: She is alert and oriented to person, place, and time. She exhibits normal muscle tone.   Skin: Skin is warm and dry. She is not diaphoretic.   Psychiatric: She has a normal mood and affect. Her behavior is normal.   Nursing note and vitals reviewed.          Procedures Performed:         Consults:   Consults     Date and Time Order Name Status Description    1/8/2017 1345 Inpatient Consult to Pulmonology Completed     1/8/2017 0120 Inpatient Consult to Hematology & Oncology Completed     1/7/2017 4164 [facility] (on-call MD unless specified) Completed          Pertinent Results:  Radha Retana   Duplex scan of extremity veins including responses to compression and other maneuvers; unilateral   Order# [id number]    Reading physician: Karthik Sanchez MD   Ordering physician: Bekah Valdovinos MD PhD   Study date: 1/10/17         Patient " "Information      Name MRN Description     Radha Retana [id number] 46 y.o. Female       Interpretation Summary      · Acute left lower extremity deep vein thrombosis noted in the peroneal.           Study Impression      • Left Peroneal: Acute deep vein thrombosis noted.         Radha Retana   Acquired echocardiogram 2D complete   Order# [id number]    Reading physician: Jose Alberto Calixto III, MD   Ordering physician: Harlan Mccloud MD   Study date: 1/9/17         Patient Information      Name MRN Description     Radha Retana [id number] 46 y.o. Female       Interpretation Summary      · All left ventricular wall segments contract normally.  · Left ventricular function is normal. Estimated EF = 64%.  · Right ventricular cavity is mildly dilated.  · Trace tricuspid valve regurgitation is present. Estimated right ventricular systolic pressure from tricuspid regurgitation is mildly elevated (35-45 mmHg).           Patient Height & Weight      Height Weight BSA (Calculated - sq m) BMI (kg/m2) Pulse     63\" (160 cm) 350 lb (159 kg) 2.45 sq meters 62.13 71       Patient Vitals      BP Pulse     111/76 71       Reason For Exam      Dyspnea       Cardiac History      No past medical history on file.       Study Description      A two-dimensional transthoracic echocardiogram with complete color flow and Doppler was performed.  The study is technically good for diagnosis.       Echocardiogram Findings      Left Ventricle  Left ventricular function is normal. Calculated EF = 63.4%. Estimated EF was in agreement with the calculated EF. Estimated EF = 64%. Normal left ventricular cavity size and wall thickness noted. All left ventricular wall segments contract normally. Septal wall motion is normal. Left ventricular diastolic function is normal. Normal left atrial pressure. There is no evidence of a left ventricular mass or thrombus present.     Right Ventricle  Normal wall thickness, systolic function and septal motion noted. " Right ventricular cavity is mildly dilated.     Left Atrium  Normal left atrial size and volume noted.     Right Atrium  Right atrial cavity size is borderline dilated.     Aortic Valve  The aortic valve is structurally normal. The valve was poorly visualized but appears trileaflet. No aortic valve regurgitation is present. No aortic valve stenosis is present.     Mitral Valve  The mitral valve is normal in structure. No mitral valve regurgitation is present. No significant mitral valve stenosis is present.     Tricuspid Valve  The tricuspid valve is normal. No tricuspid valve stenosis is present. Trace tricuspid valve regurgitation is present. Estimated right ventricular systolic pressure from tricuspid regurgitation is mildly elevated (35-45 mmHg).     Pulmonic Valve  The pulmonic valve is structurally normal. There is no significant pulmonic valve stenosis present. There is no pulmonic valve regurgitation present.     Greater Vessels  No dilation of the aortic root is present.     Pericardium  The pericardium is normal. There is no evidence of pericardial effusion.          Wall Scoring      Score Index: 1.000 Percent Normal: 100.0%             The left ventricular wall motion is normal.                   LV Measurements      Dimensions   LVIDd 4.6 cm      LVIDs 2.8 cm      IVSd 1.0 cm      LVPWd 1.1 cm      FS 39.1 %      IVS/LVPW 0.91       ESV(cubed) 22.0 ml      LV Systolic corrected for BSA 15.1 ml/m^2      EDV(cubed) 97.3 ml      LV Diastolic corrected for BSA 41.2 ml/m^2      LVOT area 2.8 cm^2      Diastolic Filling   MV E/A 1.2       MV E max yefri 100.0 cm/sec      E/E' ratio 9       MV A max yefri 80.5 cm/sec      LA Volume Index 23 mL/m2      Med Peak E' Yefri 11.00 cm/sec      Lat Peak E' Yefri 13.0 cm/sec      MV A dur 0.14 sec      MV dec time 0.22 sec       Dimensions   LV mass(C)d 169.9 grams      LVOT diam 1.9 cm      SV(MOD-sp2) 57.0 ml      EDV(MOD-sp2) 90.0 ml      EDV(MOD-sp4) 101.0 ml       ESV(MOD-sp2) 33.0 ml      ESV(MOD-sp4) 37.0 ml      EF(MOD-sp2) 63.3 %      EF(MOD-sp4) 63.4 %      SV(MOD-sp4) 64.0 ml      Shunt Ratio   SV(LVOT) 67.8 ml             Right Ventricle Measurements      TDI S' 15.00 cm/sec      RV Base 3.50 cm             LA Measurements      Measurements   LA Volume Index 23 mL/m2      Pulm Sys Yefri 44.7 cm/sec      Pulm Redd Yefri 31.6 cm/sec      Pulm S/D 1.4       Pulm A Revs Yefri 24.7 cm/sec      Pulm A Revs Dur 0.11 sec             Aortic Valve Measurements      Stenosis   LVOT diam 1.9 cm      LV V1 mean PG 2.0 mmHg      LV V1 VTI 23.9 cm       Stenosis   Ao mean PG 4.0 mmHg      Ao V2 VTI 29.6 cm      MIGUEL(I,D) 2.3 cm^2             Mitral Valve Measurements      Stenosis   MV mean PG 2.0 mmHg      MV V2 VTI 32.1 cm      MV P1/2t 82.2 msec      MVA(P1/2t) 2.7 cm^2      MVA(VTI) 2.1 cm^2      MV dec slope 360.0 cm/sec^2      MV dec time 0.22 sec       Regurgitation   MR max yefri 384.0 cm/sec      MR max PG 62.1 mmHg             Tricuspid Valve Measurements      TR max yefri 290.0 cm/sec      RVSP(TR) 41.6 mmHg      RAP systole 8.0 mmHg      TR max yefri 290.0 cm/sec             Pulmonic Valve Measurements      RVOT diam 1.6 cm      RV V1 mean 48.2 cm/sec      RV V1 mean PG 1.0 mmHg      RV V1 VTI 17.8 cm      PA V2 max 106.0 cm/sec      PA max PG 4.5 mmHg      PA acc slope 20.6 cm/sec^2      PA acc time 0.11 sec      PA pr(Accel) 31.3 mmHg             Greater Vessels Measurements      ACS 1.9 cm             Study Information      Physician Technologist Supporting Staff      [facility]      CT SCAN OF THE CHEST WITH INTRAVENOUS CONTRAST.      TECHNIQUE: A routine enhanced CTA of the chest was performed, tailored  in order to evaluate the pulmonary arteries. Sagittal and coronal  two-dimensional reformations are provided for review. Three-dimensional  reformations are also provided.      HISTORY: Shortness of breath. Examination was performed twice to  definitively see  filling defects as there was motion during the first  examination.      COMPARISON: No prior studies for comparison.      FINDINGS:   There are nonocclusive filling defects in the right pulmonary artery  branches to the right upper, middle and lower lobes. Nonocclusive  filling defects are also suspected in the pulmonary artery branches to  the left lower lobe.      There is no mediastinal lymphadenopathy or pericardial effusion.       Low lung volumes, mild pulmonary congestion and patchy opacities in the  left lower lobe which may represent an infiltrate or developing lung  infarct. A few mild patchy opacities are also noted in the right middle  lobe, etiology remains developing infarcts and/or infiltrates.       Fatty infiltration of the liver. Large ventral hernia with mesh repair.  Postcholecystectomy. Gastric surgery changes.          IMPRESSION:  1. Bilateral pulmonary emboli, thrombus burden is moderately large, no  evidence for right ventricular strain.  2. Opacities in the right middle lobe and left lower lobe are likely  from infiltrate, less likely lung infarcts.       Findings called to Dr. Vidal, 9:30 PM.      This report was finalized on 1/8/2017 11:24 PM by Dr. Penny Plata MD.    Condition on Discharge: Stable, Improved    Discharge Disposition  Home or Self Care    Discharge Medications:   Radha Retana   Home Medication Instructions MARISA:[id number]    Printed on:01/11/17 0936   Medication Information                      albuterol (PROVENTIL HFA;VENTOLIN HFA) 108 (90 BASE) MCG/ACT inhaler  Inhale 2 puffs Every 4 (Four) Hours As Needed for wheezing.             amphetamine-dextroamphetamine (ADDERALL) 20 MG tablet               budesonide-formoterol (SYMBICORT) 160-4.5 MCG/ACT inhaler  Inhale 2 puffs 2 (Two) Times a Day.             cabergoline (DOSTINEX) 0.5 MG tablet  Take 0.25 mg by mouth 2 (two) times a week.             clonazePAM (KlonoPIN) 2 MG tablet  Take 2 mg by mouth 2 (Two) Times  a Day As Needed.             cyanocobalamin (VITAMIN B-12) 500 MCG tablet  Take 1 tablet by mouth Daily.             dabigatran etexilate (PRADAXA) 150 MG capsu  Take 1 capsule by mouth Every 12 (Twelve) Hours.             Docusate Sodium (DSS) 100 MG capsule  Take 100 mg by mouth 2 (Two) Times a Day.             gabapentin (NEURONTIN) 600 MG tablet  TAKE 1 TABLET BY MOUTH THREE TIMES DAILY.             lisdexamfetamine (VYVANSE) 70 MG capsule  Take 70 mg by mouth every morning.             oxyCODONE-acetaminophen (PERCOCET) 7.5-325 MG per tablet  Take 1 tablet by mouth Every 8 (Eight) Hours As Needed for moderate pain (4-6) or severe pain (7-10) for up to 8 days.             tiZANidine (ZANAFLEX) 4 MG tablet  TAKE 1 TABLET BY MOUTH THREE TIMES DAILY             vilazodone (VIIBRYD) 40 MG tablet tablet  Take by mouth daily.             ziprasidone (GEODON) 60 MG capsule  60 mg 2 (Two) Times a Day.                 Discharge Diet: Diabetic, Heart Healthy    Activity at Discharge: Increase as tolerated    Follow-up Appointments:  Future Appointments  Date Time Provider Department Center   1/31/2017 2:30 PM LAB CHAIR 2 [facility] LAB EP None   1/31/2017 3:00 PM Harlan Mccloud MD [facility]         Test Results Pending at Discharge   Order Current Status    Beta-2 Glycoprotein Antibodies In process    Factor 5 Leiden In process    Factor II, DNA Analysis In process    Lupus Anticoagulant In process    Blood Culture Preliminary result    Blood Culture Preliminary result           Devendra Lucero MD  01/11/17  9:36 AM    Time Spent on Discharge Activities: Greater than 30 minutes

## 2017-01-11 NOTE — PLAN OF CARE
Problem: Patient Care Overview (Adult)  Goal: Plan of Care Review  Outcome: Ongoing (interventions implemented as appropriate)    01/10/17 2053 01/11/17 0522   Coping/Psychosocial Response Interventions   Plan Of Care Reviewed With patient;daughter --    Patient Care Overview   Progress --  no change   Outcome Evaluation   Outcome Summary/Follow up Plan --  VSS. C/o pain in chest, aggravated by deep breaths, managed with meds.        Goal: Adult Individualization and Mutuality  Outcome: Ongoing (interventions implemented as appropriate)  Goal: Discharge Needs Assessment  Outcome: Ongoing (interventions implemented as appropriate)    Problem: Pain, Acute (Adult)  Goal: Identify Related Risk Factors and Signs and Symptoms  Outcome: Ongoing (interventions implemented as appropriate)  Goal: Acceptable Pain Control/Comfort Level  Outcome: Ongoing (interventions implemented as appropriate)    Problem: Health Knowledge, Opportunity for Enhanced (Adult,NICU,,Obstetrics,Pediatric)  Goal: Knowledgeable about Health Subject/Topic  Outcome: Ongoing (interventions implemented as appropriate)

## 2017-01-13 LAB
BACTERIA SPEC AEROBE CULT: NORMAL
BACTERIA SPEC AEROBE CULT: NORMAL
F2 GENE MUT ANL BLD/T: NORMAL
F5 GENE MUT ANL BLD/T: NORMAL
LABORATORY COMMENT REPORT: NORMAL
Lab: NORMAL

## 2017-01-14 ENCOUNTER — APPOINTMENT (OUTPATIENT)
Dept: CARDIOLOGY | Facility: HOSPITAL | Age: 47
End: 2017-01-14

## 2017-01-14 ENCOUNTER — APPOINTMENT (OUTPATIENT)
Dept: CT IMAGING | Facility: HOSPITAL | Age: 47
End: 2017-01-14

## 2017-01-14 ENCOUNTER — HOSPITAL ENCOUNTER (EMERGENCY)
Facility: HOSPITAL | Age: 47
Discharge: HOME | End: 2017-01-14
Attending: EMERGENCY MEDICINE | Admitting: EMERGENCY MEDICINE

## 2017-01-14 VITALS
RESPIRATION RATE: 16 BRPM | HEIGHT: 63 IN | SYSTOLIC BLOOD PRESSURE: 169 MMHG | TEMPERATURE: 97.2 F | DIASTOLIC BLOOD PRESSURE: 100 MMHG | OXYGEN SATURATION: 92 % | HEART RATE: 90 BPM | BODY MASS INDEX: 51.91 KG/M2 | WEIGHT: 293 LBS

## 2017-01-14 DIAGNOSIS — I26.99 OTHER ACUTE PULMONARY EMBOLISM WITHOUT ACUTE COR PULMONALE (HCC): Primary | ICD-10-CM

## 2017-01-14 DIAGNOSIS — I82.4Z2 ACUTE DEEP VEIN THROMBOSIS (DVT) OF DISTAL VEIN OF LEFT LOWER EXTREMITY (HCC): ICD-10-CM

## 2017-01-14 LAB
ALBUMIN SERPL-MCNC: 3.5 G/DL (ref 3.5–5.2)
ALBUMIN/GLOB SERPL: 1 G/DL
ALP SERPL-CCNC: 129 U/L (ref 39–117)
ALT SERPL W P-5'-P-CCNC: 28 U/L (ref 1–33)
ANION GAP SERPL CALCULATED.3IONS-SCNC: 10.9 MMOL/L
APTT PPP: 31.7 SECONDS (ref 22.7–35.4)
AST SERPL-CCNC: 26 U/L (ref 1–32)
BASOPHILS # BLD AUTO: 0.05 10*3/MM3 (ref 0–0.2)
BASOPHILS NFR BLD AUTO: 0.4 % (ref 0–1.5)
BILIRUB SERPL-MCNC: 0.2 MG/DL (ref 0.1–1.2)
BUN BLD-MCNC: 10 MG/DL (ref 6–20)
BUN/CREAT SERPL: 13.9 (ref 7–25)
CALCIUM SPEC-SCNC: 8.6 MG/DL (ref 8.6–10.5)
CHLORIDE SERPL-SCNC: 99 MMOL/L (ref 98–107)
CO2 SERPL-SCNC: 29.1 MMOL/L (ref 22–29)
CREAT BLD-MCNC: 0.72 MG/DL (ref 0.57–1)
DEPRECATED RDW RBC AUTO: 54.3 FL (ref 37–54)
EOSINOPHIL # BLD AUTO: 0.28 10*3/MM3 (ref 0–0.7)
EOSINOPHIL NFR BLD AUTO: 2.5 % (ref 0.3–6.2)
ERYTHROCYTE [DISTWIDTH] IN BLOOD BY AUTOMATED COUNT: 16.6 % (ref 11.7–13)
GFR SERPL CREATININE-BSD FRML MDRD: 87 ML/MIN/1.73
GLOBULIN UR ELPH-MCNC: 3.5 GM/DL
GLUCOSE BLD-MCNC: 72 MG/DL (ref 65–99)
HCG SERPL QL: NEGATIVE
HCT VFR BLD AUTO: 35.8 % (ref 35.6–45.5)
HGB BLD-MCNC: 11.1 G/DL (ref 11.9–15.5)
IMM GRANULOCYTES # BLD: 0.07 10*3/MM3 (ref 0–0.03)
IMM GRANULOCYTES NFR BLD: 0.6 % (ref 0–0.5)
INR PPP: 0.94 (ref 0.9–1.1)
LYMPHOCYTES # BLD AUTO: 2.06 10*3/MM3 (ref 0.9–4.8)
LYMPHOCYTES NFR BLD AUTO: 18.3 % (ref 19.6–45.3)
MCH RBC QN AUTO: 28.5 PG (ref 26.9–32)
MCHC RBC AUTO-ENTMCNC: 31 G/DL (ref 32.4–36.3)
MCV RBC AUTO: 92 FL (ref 80.5–98.2)
MONOCYTES # BLD AUTO: 0.57 10*3/MM3 (ref 0.2–1.2)
MONOCYTES NFR BLD AUTO: 5.1 % (ref 5–12)
NEUTROPHILS # BLD AUTO: 8.24 10*3/MM3 (ref 1.9–8.1)
NEUTROPHILS NFR BLD AUTO: 73.1 % (ref 42.7–76)
NRBC BLD MANUAL-RTO: 0 /100 WBC (ref 0–0)
PLATELET # BLD AUTO: 345 10*3/MM3 (ref 140–500)
PMV BLD AUTO: 10 FL (ref 6–12)
POTASSIUM BLD-SCNC: 4.1 MMOL/L (ref 3.5–5.2)
PROT SERPL-MCNC: 7 G/DL (ref 6–8.5)
PROTHROMBIN TIME: 12.2 SECONDS (ref 11.7–14.2)
RBC # BLD AUTO: 3.89 10*6/MM3 (ref 3.9–5.2)
SODIUM BLD-SCNC: 139 MMOL/L (ref 136–145)
TROPONIN T SERPL-MCNC: <0.01 NG/ML (ref 0–0.03)
WBC NRBC COR # BLD: 11.27 10*3/MM3 (ref 4.5–10.7)

## 2017-01-14 PROCEDURE — 71275 CT ANGIOGRAPHY CHEST: CPT

## 2017-01-14 PROCEDURE — 85025 COMPLETE CBC W/AUTO DIFF WBC: CPT | Performed by: PHYSICIAN ASSISTANT

## 2017-01-14 PROCEDURE — 25010000002 DIPHENHYDRAMINE PER 50 MG: Performed by: EMERGENCY MEDICINE

## 2017-01-14 PROCEDURE — 80053 COMPREHEN METABOLIC PANEL: CPT | Performed by: PHYSICIAN ASSISTANT

## 2017-01-14 PROCEDURE — 96374 THER/PROPH/DIAG INJ IV PUSH: CPT

## 2017-01-14 PROCEDURE — 25010000002 HYDROMORPHONE PER 4 MG: Performed by: EMERGENCY MEDICINE

## 2017-01-14 PROCEDURE — 99284 EMERGENCY DEPT VISIT MOD MDM: CPT

## 2017-01-14 PROCEDURE — 93005 ELECTROCARDIOGRAM TRACING: CPT

## 2017-01-14 PROCEDURE — 84703 CHORIONIC GONADOTROPIN ASSAY: CPT | Performed by: PHYSICIAN ASSISTANT

## 2017-01-14 PROCEDURE — 25010000002 ONDANSETRON PER 1 MG: Performed by: EMERGENCY MEDICINE

## 2017-01-14 PROCEDURE — 0 IOPAMIDOL PER 1 ML: Performed by: EMERGENCY MEDICINE

## 2017-01-14 PROCEDURE — 96376 TX/PRO/DX INJ SAME DRUG ADON: CPT

## 2017-01-14 PROCEDURE — 93010 ELECTROCARDIOGRAM REPORT: CPT | Performed by: INTERNAL MEDICINE

## 2017-01-14 PROCEDURE — 93971 EXTREMITY STUDY: CPT

## 2017-01-14 PROCEDURE — 25010000002 PROCHLORPERAZINE EDISYLATE PER 10 MG: Performed by: EMERGENCY MEDICINE

## 2017-01-14 PROCEDURE — 96375 TX/PRO/DX INJ NEW DRUG ADDON: CPT

## 2017-01-14 PROCEDURE — 84484 ASSAY OF TROPONIN QUANT: CPT | Performed by: PHYSICIAN ASSISTANT

## 2017-01-14 PROCEDURE — 85730 THROMBOPLASTIN TIME PARTIAL: CPT | Performed by: PHYSICIAN ASSISTANT

## 2017-01-14 PROCEDURE — 85610 PROTHROMBIN TIME: CPT | Performed by: PHYSICIAN ASSISTANT

## 2017-01-14 RX ORDER — DIPHENHYDRAMINE HYDROCHLORIDE 50 MG/ML
12.5 INJECTION INTRAMUSCULAR; INTRAVENOUS EVERY 6 HOURS PRN
Status: DISCONTINUED | OUTPATIENT
Start: 2017-01-14 | End: 2017-01-15 | Stop reason: HOSPADM

## 2017-01-14 RX ORDER — ONDANSETRON 2 MG/ML
4 INJECTION INTRAMUSCULAR; INTRAVENOUS ONCE
Status: COMPLETED | OUTPATIENT
Start: 2017-01-14 | End: 2017-01-14

## 2017-01-14 RX ORDER — SODIUM CHLORIDE 0.9 % (FLUSH) 0.9 %
10 SYRINGE (ML) INJECTION AS NEEDED
Status: DISCONTINUED | OUTPATIENT
Start: 2017-01-14 | End: 2017-01-15 | Stop reason: HOSPADM

## 2017-01-14 RX ADMIN — ONDANSETRON 4 MG: 2 INJECTION INTRAMUSCULAR; INTRAVENOUS at 19:55

## 2017-01-14 RX ADMIN — PROCHLORPERAZINE EDISYLATE 10 MG: 5 INJECTION INTRAMUSCULAR; INTRAVENOUS at 22:27

## 2017-01-14 RX ADMIN — ONDANSETRON 4 MG: 2 INJECTION INTRAMUSCULAR; INTRAVENOUS at 22:04

## 2017-01-14 RX ADMIN — HYDROMORPHONE HYDROCHLORIDE 1 MG: 1 INJECTION, SOLUTION INTRAMUSCULAR; INTRAVENOUS; SUBCUTANEOUS at 19:55

## 2017-01-14 RX ADMIN — HYDROMORPHONE HYDROCHLORIDE 1 MG: 1 INJECTION, SOLUTION INTRAMUSCULAR; INTRAVENOUS; SUBCUTANEOUS at 22:01

## 2017-01-14 RX ADMIN — IOPAMIDOL 95 ML: 755 INJECTION, SOLUTION INTRAVENOUS at 21:37

## 2017-01-14 RX ADMIN — DIPHENHYDRAMINE HYDROCHLORIDE 12.5 MG: 50 INJECTION, SOLUTION INTRAMUSCULAR; INTRAVENOUS at 22:27

## 2017-01-14 NOTE — ED PROVIDER NOTES
EMERGENCY DEPARTMENT ENCOUNTER    CHIEF COMPLAINT  Chief Complaint: Leg Pain  History given by: Patient  History limited by:   Room Number:   PMD: Dilma Landeros MD  Hematologist: Dr. Valdovinos    HPI:  Pt is a 46 y.o. female who presents complaining of BLE pain and cramping that onset yesterday. Pt was recently admitted for PE, left leg DVT, and pneumonia. She was started on Pradaxa and has been compliant. Pt spoke with hematologist who referred her to the ED for a doppler. Pt also reports some chest pain and SOA similar to her previous PE sxs.    Duration:  1 day  Onset: sudden  Timing: constant  Location: BLE  Radiation: none  Quality: cramping  Intensity/Severity: moderate  Progression: worsening  Associated Symptoms: CP, SOA  Aggravating Factors: none  Alleviating Factors: none  Previous Episodes: recently discharged after being admitted for PE and pneumonia  Treatment before arrival: taking Pradaxa    PAST MEDICAL HISTORY  Active Ambulatory Problems     Diagnosis Date Noted   • Anxiety 02/15/2016   • Depression 02/15/2016   • Knee pain 02/15/2016   • Low back pain 02/15/2016   • Psoriasis 02/15/2016   • Sciatica 02/15/2016   • Morbid obesity due to excess calories 2016   • Disorder of lactation 2016   • Pituitary adenoma 2016   • Chronic pain of both knees 2016   • Pulmonary embolism without acute cor pulmonale 2017   • B12 deficiency 2017   • Iron deficiency anemia 2017     Resolved Ambulatory Problems     Diagnosis Date Noted   • Acute respiratory failure with hypoxia 2017     Past Medical History   Diagnosis Date   • Arthritis    • Bipolar depression    • Hyperprolinemia    • Migraine    • Neuromuscular disorder    • Obesity    • Pituitary tumor        PAST SURGICAL HISTORY  Past Surgical History   Procedure Laterality Date   • Bariatric surgery     •  section     • Back surgery     • Cholecystectomy     • Dilation and curettage, diagnostic /  therapeutic         FAMILY HISTORY  Family History   Problem Relation Age of Onset   • Heart disease Mother    • Hypertension Mother    • Bipolar disorder Mother    • Heart disease Father    • Diabetes Father    • Alcohol abuse Brother    • Asthma Daughter    • Alcohol abuse Brother    • Bipolar disorder Brother        SOCIAL HISTORY  Social History     Social History   • Marital status:      Spouse name: N/A   • Number of children: N/A   • Years of education: N/A     Occupational History   • Not on file.     Social History Main Topics   • Smoking status: Former Smoker   • Smokeless tobacco: Never Used   • Alcohol use Yes      Comment: socially   • Drug use: No   • Sexual activity: Defer     Other Topics Concern   • Not on file     Social History Narrative       ALLERGIES  Adhesive tape; Penicillins; Sulfa antibiotics; and Morphine    REVIEW OF SYSTEMS  Review of Systems   Constitutional: Negative for fever.   HENT: Negative for sore throat.    Eyes: Negative.    Respiratory: Negative for cough and shortness of breath.    Cardiovascular: Negative for chest pain.   Gastrointestinal: Negative for abdominal pain, diarrhea and vomiting.   Genitourinary: Negative for dysuria.   Musculoskeletal: Positive for myalgias (BLE). Negative for neck pain.   Skin: Negative for rash.   Allergic/Immunologic: Negative.    Neurological: Negative for weakness, numbness and headaches.   Hematological: Negative.    Psychiatric/Behavioral: Negative.    All other systems reviewed and are negative.      PHYSICAL EXAM  ED Triage Vitals   Temp Heart Rate Resp BP SpO2   01/14/17 1808 01/14/17 1808 01/14/17 1808 01/14/17 1808 01/14/17 1808   97.6 °F (36.4 °C) 100 16 147/82 98 %      Temp src Heart Rate Source Patient Position BP Location FiO2 (%)   01/14/17 1808 -- -- -- --   Tympanic           Physical Exam   Constitutional: She is oriented to person, place, and time and well-developed, well-nourished, and in no distress. No distress.    HENT:   Head: Normocephalic and atraumatic.   Eyes: EOM are normal. Pupils are equal, round, and reactive to light.   Neck: Normal range of motion. Neck supple.   Cardiovascular: Normal rate, regular rhythm and normal heart sounds.    Pulmonary/Chest: Effort normal and breath sounds normal. No respiratory distress.   Abdominal: Soft. There is no tenderness. There is no rebound and no guarding.   Obese   Musculoskeletal: Normal range of motion. She exhibits no edema.   Bilateral calf tenderness.  No swelling.    Neurological: She is alert and oriented to person, place, and time. She has normal sensation and normal strength.   Skin: Skin is warm and dry. No rash noted.   Psychiatric: Mood and affect normal.   Nursing note and vitals reviewed.      LAB RESULTS  Lab Results (last 24 hours)     Procedure Component Value Units Date/Time    CBC & Differential [[id number]] Collected:  01/14/17 1930    Specimen:  Blood Updated:  01/14/17 1943    Narrative:       The following orders were created for panel order CBC & Differential.  Procedure                               Abnormality         Status                     ---------                               -----------         ------                     CBC Auto Differential[[id number]]         Abnormal            Final result                 Please view results for these tests on the individual orders.    Protime-INR [[id number]]  (Normal) Collected:  01/14/17 1930    Specimen:  Blood Updated:  01/14/17 1949     Protime 12.2 Seconds      INR 0.94     aPTT [[id number]]  (Normal) Collected:  01/14/17 1930    Specimen:  Blood Updated:  01/14/17 1949     PTT 31.7 seconds     CBC Auto Differential [[id number]]  (Abnormal) Collected:  01/14/17 1930    Specimen:  Blood Updated:  01/14/17 1943     WBC 11.27 (H) 10*3/mm3      RBC 3.89 (L) 10*6/mm3      Hemoglobin 11.1 (L) g/dL      Hematocrit 35.8 %      MCV 92.0 fL      MCH 28.5 pg      MCHC 31.0 (L) g/dL      RDW 16.6 (H) %      RDW-SD  54.3 (H) fl      MPV 10.0 fL      Platelets 345 10*3/mm3      Neutrophil % 73.1 %      Lymphocyte % 18.3 (L) %      Monocyte % 5.1 %      Eosinophil % 2.5 %      Basophil % 0.4 %      Immature Grans % 0.6 (H) %      Neutrophils, Absolute 8.24 (H) 10*3/mm3      Lymphocytes, Absolute 2.06 10*3/mm3      Monocytes, Absolute 0.57 10*3/mm3      Eosinophils, Absolute 0.28 10*3/mm3      Basophils, Absolute 0.05 10*3/mm3      Immature Grans, Absolute 0.07 (H) 10*3/mm3      nRBC 0.0 /100 WBC     hCG, Serum, Qualitative [[id number]]  (Normal) Collected:  01/14/17 2005    Specimen:  Blood Updated:  01/14/17 2039     HCG Qualitative Negative     Comprehensive Metabolic Panel [[id number]]  (Abnormal) Collected:  01/14/17 2006    Specimen:  Blood Updated:  01/14/17 2046     Glucose 72 mg/dL      BUN 10 mg/dL      Creatinine 0.72 mg/dL      Sodium 139 mmol/L      Potassium 4.1 mmol/L      Chloride 99 mmol/L      CO2 29.1 (H) mmol/L      Calcium 8.6 mg/dL      Total Protein 7.0 g/dL      Albumin 3.50 g/dL      ALT (SGPT) 28 U/L      AST (SGOT) 26 U/L      Alkaline Phosphatase 129 (H) U/L      Total Bilirubin 0.2 mg/dL      eGFR Non African Amer 87 mL/min/1.73      Globulin 3.5 gm/dL      A/G Ratio 1.0 g/dL      BUN/Creatinine Ratio 13.9      Anion Gap 10.9 mmol/L     Troponin [[id number]]  (Normal) Collected:  01/14/17 2006    Specimen:  Blood Updated:  01/14/17 2202     Troponin T <0.010 ng/mL     Narrative:       Troponin T Reference Ranges:  Less than 0.03 ng/mL:    Negative for AMI  0.03 to 0.09 ng/mL:      Indeterminant for AMI  Greater than 0.09 ng/mL: Positive for AMI          I ordered the above labs and reviewed the results    RADIOLOGY  CT Angiogram Chest With Contrast - smaller and fewer PEs. No progression of PEs   Preliminary doppler negative DVT R leg, unchanged L leg since prior examination    I ordered the above noted radiological studies. Interpreted by radiologist. Discussed with radiologist (Dr. Forrest). Reviewed by  me in PACS.       PROCEDURES  Procedures  See MD note for EKG interpretation    PROGRESS AND CONSULTS  ED Course   7:05 PM  Will work-up for possible medication failure.  Ordered blood work, CTA chest, LE doppler, and EKG.   8:12 PM  Discussed pt with Dr. Castaneda. Dr. Castaneda has seen and evaluated pt and agrees with tx plan.  9:58 PM  Rechecked with pt. Informed pt of her imaging showing no increased PE and plan to discharge. Pt understands and agrees with plan. All concerns addressed.         MEDICAL DECISION MAKING      MDM  Number of Diagnoses or Management Options  Acute deep vein thrombosis (DVT) of distal vein of left lower extremity:   Other acute pulmonary embolism without acute cor pulmonale:   Diagnosis management comments: Pt has stable vitals.  No worsening findings.  Will continue Pradaxa and have close follow-up.        Amount and/or Complexity of Data Reviewed  Clinical lab tests: ordered and reviewed (Blood work)  Tests in the radiology section of CPT®: ordered and reviewed (CTA chest, LE Doppler)  Tests in the medicine section of CPT®: ordered and reviewed (EKG)  Decide to obtain previous medical records or to obtain history from someone other than the patient: yes  Review and summarize past medical records: yes (Admitted 1/7-1/11/17 for bilateral PE and pnuemonia. )  Independent visualization of images, tracings, or specimens: yes           DIAGNOSIS  Final diagnoses:   Other acute pulmonary embolism without acute cor pulmonale   Acute deep vein thrombosis (DVT) of distal vein of left lower extremity       DISPOSITION  DISCHARGE    Patient discharged in stable condition.    Reviewed implications of results, diagnosis, meds, responsibility to follow up, warning signs and symptoms of possible worsening, potential complications and reasons to return to ER.    Patient/Family voiced understanding of above instructions.    Discussed plan for discharge, as there is no emergent indication for admission.   Pt/family is agreeable and understands need for follow up and repeat testing.  Pt is aware that discharge does not mean that nothing is wrong but it indicates no emergency is present that requires admission and they must continue care with follow-up as given below or physician of their choice.     FOLLOW-UP  Dilma Landeros MD  [address]  [address]  [place] [id number] [phone]      as directed         Medication List      Notice     No changes were made to your prescriptions during this visit.            Latest Documented Vital Signs:  As of 10:29 PM  BP- 144/83 HR- 83 Temp- 97.6 °F (36.4 °C) (Tympanic) O2 sat- 96%    --  Documentation assistance provided by artemio Liz for Jero Deluca PA-C.  Information recorded by the scribe was done at my direction and has been verified and validated by me.         Servando Liz  01/14/17 2111       Servando Liz  01/14/17 2202       FATMATA [place]  01/14/17 4027

## 2017-01-15 LAB
BH CV LOW VAS LEFT PERONEAL VESSEL: 1
BH CV LOWER VASCULAR LEFT COMMON FEMORAL AUGMENT: NORMAL
BH CV LOWER VASCULAR LEFT COMMON FEMORAL COMPETENT: NORMAL
BH CV LOWER VASCULAR LEFT COMMON FEMORAL COMPRESS: NORMAL
BH CV LOWER VASCULAR LEFT COMMON FEMORAL PHASIC: NORMAL
BH CV LOWER VASCULAR LEFT COMMON FEMORAL SPONT: NORMAL
BH CV LOWER VASCULAR LEFT DISTAL FEMORAL COMPRESS: NORMAL
BH CV LOWER VASCULAR LEFT GASTRONEMIUS COMPRESS: NORMAL
BH CV LOWER VASCULAR LEFT GREATER SAPH AK COMPRESS: NORMAL
BH CV LOWER VASCULAR LEFT GREATER SAPH BK COMPRESS: NORMAL
BH CV LOWER VASCULAR LEFT LESSER SAPH COMPRESS: NORMAL
BH CV LOWER VASCULAR LEFT MID FEMORAL AUGMENT: NORMAL
BH CV LOWER VASCULAR LEFT MID FEMORAL COMPETENT: NORMAL
BH CV LOWER VASCULAR LEFT MID FEMORAL COMPRESS: NORMAL
BH CV LOWER VASCULAR LEFT MID FEMORAL PHASIC: NORMAL
BH CV LOWER VASCULAR LEFT MID FEMORAL SPONT: NORMAL
BH CV LOWER VASCULAR LEFT PERONEAL COMPRESS: NORMAL
BH CV LOWER VASCULAR LEFT PERONEAL THROMBUS: NORMAL
BH CV LOWER VASCULAR LEFT POPLITEAL AUGMENT: NORMAL
BH CV LOWER VASCULAR LEFT POPLITEAL COMPETENT: NORMAL
BH CV LOWER VASCULAR LEFT POPLITEAL COMPRESS: NORMAL
BH CV LOWER VASCULAR LEFT POPLITEAL PHASIC: NORMAL
BH CV LOWER VASCULAR LEFT POPLITEAL SPONT: NORMAL
BH CV LOWER VASCULAR LEFT POSTERIOR TIBIAL COMPRESS: NORMAL
BH CV LOWER VASCULAR LEFT PROXIMAL FEMORAL COMPRESS: NORMAL
BH CV LOWER VASCULAR LEFT SAPHENOFEMORAL JUNCTION AUGMENT: NORMAL
BH CV LOWER VASCULAR LEFT SAPHENOFEMORAL JUNCTION COMPETENT: NORMAL
BH CV LOWER VASCULAR LEFT SAPHENOFEMORAL JUNCTION COMPRESS: NORMAL
BH CV LOWER VASCULAR LEFT SAPHENOFEMORAL JUNCTION PHASIC: NORMAL
BH CV LOWER VASCULAR LEFT SAPHENOFEMORAL JUNCTION SPONT: NORMAL
BH CV LOWER VASCULAR RIGHT COMMON FEMORAL AUGMENT: NORMAL
BH CV LOWER VASCULAR RIGHT COMMON FEMORAL COMPETENT: NORMAL
BH CV LOWER VASCULAR RIGHT COMMON FEMORAL COMPRESS: NORMAL
BH CV LOWER VASCULAR RIGHT COMMON FEMORAL PHASIC: NORMAL
BH CV LOWER VASCULAR RIGHT COMMON FEMORAL SPONT: NORMAL
BH CV LOWER VASCULAR RIGHT DISTAL FEMORAL COMPRESS: NORMAL
BH CV LOWER VASCULAR RIGHT GASTRONEMIUS COMPRESS: NORMAL
BH CV LOWER VASCULAR RIGHT GREATER SAPH AK COMPRESS: NORMAL
BH CV LOWER VASCULAR RIGHT GREATER SAPH BK COMPRESS: NORMAL
BH CV LOWER VASCULAR RIGHT LESSER SAPH COMPRESS: NORMAL
BH CV LOWER VASCULAR RIGHT MID FEMORAL AUGMENT: NORMAL
BH CV LOWER VASCULAR RIGHT MID FEMORAL COMPETENT: NORMAL
BH CV LOWER VASCULAR RIGHT MID FEMORAL COMPRESS: NORMAL
BH CV LOWER VASCULAR RIGHT MID FEMORAL PHASIC: NORMAL
BH CV LOWER VASCULAR RIGHT MID FEMORAL SPONT: NORMAL
BH CV LOWER VASCULAR RIGHT PERONEAL COMPRESS: NORMAL
BH CV LOWER VASCULAR RIGHT POPLITEAL AUGMENT: NORMAL
BH CV LOWER VASCULAR RIGHT POPLITEAL COMPETENT: NORMAL
BH CV LOWER VASCULAR RIGHT POPLITEAL COMPRESS: NORMAL
BH CV LOWER VASCULAR RIGHT POPLITEAL PHASIC: NORMAL
BH CV LOWER VASCULAR RIGHT POPLITEAL SPONT: NORMAL
BH CV LOWER VASCULAR RIGHT POSTERIOR TIBIAL COMPRESS: NORMAL
BH CV LOWER VASCULAR RIGHT PROXIMAL FEMORAL COMPRESS: NORMAL
BH CV LOWER VASCULAR RIGHT SAPHENOFEMORAL JUNCTION AUGMENT: NORMAL
BH CV LOWER VASCULAR RIGHT SAPHENOFEMORAL JUNCTION COMPETENT: NORMAL
BH CV LOWER VASCULAR RIGHT SAPHENOFEMORAL JUNCTION COMPRESS: NORMAL
BH CV LOWER VASCULAR RIGHT SAPHENOFEMORAL JUNCTION PHASIC: NORMAL
BH CV LOWER VASCULAR RIGHT SAPHENOFEMORAL JUNCTION SPONT: NORMAL

## 2017-01-15 NOTE — ED PROVIDER NOTES
Pt presents to the ED c/o BLE pain onset yesterday. Pt was recently discharged from the hospital after being admitted for PE and pneumonia.     PE:  A&O x3, no distress  Heart RRR  Lungs clear    EKG:        EKG time: 18:50  Rhythm/Rate: NSR  P waves and WI: normal  QRS, axis: normal      Interpreted Contemporaneously by me, independently viewed  Unchanged compared to prior 1/4/17    Doppler: negative DVT R leg, unchanged L leg since prior examination  CTA Chest: smaller and fewer PEs, no progression of PEs    Pt will be discharged.       I supervised care provided by the midlevel provider Jero Deluca PA-C.    We have discussed this patient's history, physical exam, and treatment plan.   I have reviewed the note and personally saw and examined the patient and agree with the plan of care.    Documentation assistance provided by artemio Morrison for Dr. Tonya MD.  Information recorded by the scribe was done at my direction and has been verified and validated by me.             Lisy Morrison  01/14/17 9518       Gualberto Castaneda MD  01/15/17 8127

## 2017-01-16 ENCOUNTER — APPOINTMENT (OUTPATIENT)
Dept: ONCOLOGY | Facility: HOSPITAL | Age: 47
End: 2017-01-16

## 2017-01-19 ENCOUNTER — OFFICE VISIT (OUTPATIENT)
Dept: FAMILY MEDICINE CLINIC | Facility: CLINIC | Age: 47
End: 2017-01-19

## 2017-01-19 VITALS
DIASTOLIC BLOOD PRESSURE: 70 MMHG | BODY MASS INDEX: 51.91 KG/M2 | HEIGHT: 63 IN | TEMPERATURE: 98.2 F | WEIGHT: 293 LBS | OXYGEN SATURATION: 99 % | HEART RATE: 95 BPM | SYSTOLIC BLOOD PRESSURE: 130 MMHG

## 2017-01-19 DIAGNOSIS — G89.29 CHRONIC PAIN OF BOTH KNEES: ICD-10-CM

## 2017-01-19 DIAGNOSIS — I26.99 OTHER PULMONARY EMBOLISM WITHOUT ACUTE COR PULMONALE (HCC): Primary | ICD-10-CM

## 2017-01-19 DIAGNOSIS — M25.561 CHRONIC PAIN OF BOTH KNEES: ICD-10-CM

## 2017-01-19 DIAGNOSIS — M25.562 CHRONIC PAIN OF BOTH KNEES: ICD-10-CM

## 2017-01-19 PROCEDURE — 99213 OFFICE O/P EST LOW 20 MIN: CPT | Performed by: FAMILY MEDICINE

## 2017-01-19 RX ORDER — OXYCODONE AND ACETAMINOPHEN 7.5; 325 MG/1; MG/1
1 TABLET ORAL EVERY 8 HOURS PRN
Qty: 90 TABLET | Refills: 0 | Status: SHIPPED | OUTPATIENT
Start: 2017-01-19 | End: 2017-01-27

## 2017-01-19 NOTE — MR AVS SNAPSHOT
Radha Retana   1/19/2017 2:30 PM   Office Visit    Dept Phone:  [phone]   Encounter #:  [id number]    Provider:  Dilma Landeros MD   Department:  [facility] PRIMARY CARE                Your Full Care Plan              Where to Get Your Medications      You can get these medications from any pharmacy     Bring a paper prescription for each of these medications     oxyCODONE-acetaminophen 7.5-325 MG per tablet            Your Updated Medication List          This list is accurate as of: 1/19/17  3:04 PM.  Always use your most recent med list.                albuterol 108 (90 BASE) MCG/ACT inhaler   Commonly known as:  PROVENTIL HFA;VENTOLIN HFA   Inhale 2 puffs Every 4 (Four) Hours As Needed for wheezing.       * amphetamine-dextroamphetamine 20 MG tablet   Commonly known as:  ADDERALL       * AMPHETAMINE-DEXTROAMPHETAMINE PO       budesonide-formoterol 160-4.5 MCG/ACT inhaler   Commonly known as:  SYMBICORT   Inhale 2 puffs 2 (Two) Times a Day.       cabergoline 0.5 MG tablet   Commonly known as:  DOSTINEX       clonazePAM 2 MG tablet   Commonly known as:  KlonoPIN       cyanocobalamin 500 MCG tablet   Commonly known as:  VITAMIN B-12   Take 1 tablet by mouth Daily.       dabigatran etexilate 150 MG capsu   Commonly known as:  PRADAXA   Take 1 capsule by mouth Every 12 (Twelve) Hours.       dexamethasone 1 MG tablet   Commonly known as:  DECADRON        MG capsule   Take 100 mg by mouth 2 (Two) Times a Day.       gabapentin 600 MG tablet   Commonly known as:  NEURONTIN   TAKE 1 TABLET BY MOUTH THREE TIMES DAILY.       Insulin Pen Needle 32G X 6 MM misc       L-METHYLFOLATE CALCIUM PO       lisdexamfetamine 70 MG capsule   Commonly known as:  VYVANSE       metFORMIN 500 MG tablet   Commonly known as:  GLUCOPHAGE       oxyCODONE-acetaminophen 7.5-325 MG per tablet   Commonly known as:  PERCOCET   Take 1 tablet by mouth Every 8 (Eight) Hours As Needed for  moderate pain (4-6) or severe pain (7-10) for up to 8 days.       tiZANidine 4 MG tablet   Commonly known as:  ZANAFLEX   TAKE 1 TABLET BY MOUTH THREE TIMES DAILY       VICTOZA 18 MG/3ML solution pen-injector   Generic drug:  Liraglutide       vilazodone 40 MG tablet tablet   Commonly known as:  VIIBRYD       ziprasidone 60 MG capsule   Commonly known as:  GEODON       * Notice:  This list has 2 medication(s) that are the same as other medications prescribed for you. Read the directions carefully, and ask your doctor or other care provider to review them with you.            You Were Diagnosed With        Codes Comments    Other pulmonary embolism without acute cor pulmonale    -  Primary ICD-10-CM: I26.99  ICD-9-CM: 415.19     Chronic pain of both knees     ICD-10-CM: M25.561, M25.562, G89.29  ICD-9-CM: 719.46, 338.29       Instructions     None    Patient Instructions History      Upcoming Appointments     Visit Type Date Time Department    OFFICE VISIT 1/19/2017  2:30 PM [facility] FOLLOW UP - NEW PT 1/31/2017  3:00 PM [facility]    LAB 1/31/2017  2:30 PM  [facility] CBC LAB [facility]      MyChart Signup     Our records indicate that you have an active [employer] account.    You can view your After Visit Summary by going to [employer] and logging in with your [employer] username and password.  If you don't have a [employer] username and password but a parent or guardian has access to your record, the parent or guardian should login with their own [employer] username and password and access your record to view the After Visit Summary.    If you have questions, you can email [email or web address] or call [phone] to talk to our [employer] staff.  Remember, [employer] is NOT to be used for urgent needs.  For medical emergencies, dial 911.               Other Info from Your Visit           Your Appointments     Jan 31, 2017  2:30 PM EST   Lab with LAB CHAIR [address]  "  [facility] ONC LAB (--)    [address]  [address]  [facility] [id number] [phone]            Jan 31, 2017  3:00 PM EST   HOSITAL FOLLOW UP with Harlan Mccloud MD   [facility] BLOOD DISORDERS AND CANCER ([facility])    [address]  [address]  [facility] [address] [phone]              Allergies     Adhesive Tape      Penicillins      Morphine  Hives, Rash    Sulfa Antibiotics  Hives, Rash      Reason for Visit     clots in lungs hospital folow up      Vital Signs     Blood Pressure Pulse Temperature Height Weight Last Menstrual Period    130/70 95 98.2 °F (36.8 °C) 63\" (160 cm) 347 lb 3.2 oz (157 kg) 01/07/2017 (Exact Date)    Oxygen Saturation Body Mass Index Smoking Status             99% 61.5 kg/m2 Former Smoker         Problems and Diagnoses Noted     Knee pain    Pulmonary embolism without acute cor pulmonale        "

## 2017-01-19 NOTE — PROGRESS NOTES
Subjective   Radha Retana is a 46 y.o. female.CC:  Pulmonary emboli  History of Present Illness   Radha is a 46 year old female who comes in because of pulmonary emboli.  She was diagnosed with these on 1/7 and stayed until 1/11.  She is on Pradaxa.  She has appointments to see hematologist and pulmonologist.  No previous history of blood clots.  No family history of clotting.  She was sent home on Oxycodone and is done with those.  Has pain in her knees as well as in her lungs from the clots.  The medication does help.        The following portions of the patient's history were reviewed and updated as appropriate: allergies, current medications, past medical history, past social history, past surgical history and problem list.    Review of Systems   Constitutional: Positive for fatigue. Negative for chills, diaphoresis and fever.   HENT: Negative.  Negative for congestion, postnasal drip, rhinorrhea and sinus pressure.    Eyes: Negative.  Negative for visual disturbance.   Respiratory: Positive for shortness of breath. Negative for cough and wheezing.    Cardiovascular: Negative.  Negative for chest pain, palpitations and leg swelling.   Gastrointestinal: Negative.  Negative for abdominal pain, blood in stool, constipation, diarrhea, nausea and vomiting.   Genitourinary: Negative.  Negative for difficulty urinating.   Musculoskeletal: Positive for arthralgias.        Knee pain  Pain in chest from the blood clots   Skin: Negative.    Neurological: Negative.  Negative for dizziness, light-headedness and headaches.   Psychiatric/Behavioral: Negative for dysphoric mood and sleep disturbance. The patient is nervous/anxious.        Objective   Physical Exam   Constitutional: She is oriented to person, place, and time. She appears well-developed and well-nourished.   Neck: Normal range of motion. Neck supple. No JVD present. No thyromegaly present.   Cardiovascular: Normal rate and regular rhythm.    No murmur  heard.  Pulmonary/Chest: Effort normal and breath sounds normal. No respiratory distress.   Lymphadenopathy:     She has no cervical adenopathy.   Neurological: She is alert and oriented to person, place, and time.   Skin: Skin is warm and dry. No rash noted.   Psychiatric: She has a normal mood and affect. Her behavior is normal.   Nursing note and vitals reviewed.    Vitals:    01/19/17 1434   BP: 130/70   Pulse: 95   Temp: 98.2 °F (36.8 °C)   SpO2: 99%     Assessment/Plan   Problems Addressed this Visit        Cardiovascular and Mediastinum    Pulmonary embolism without acute cor pulmonale - Primary       Musculoskeletal and Integument    Knee pain      New script for Oxycodone given, #90.    The patient has read and signed the [facility] [employer] Substance Contract.  I will continue to see patient for regular follow up appointments.  They are well controlled on their medication.  EMILY is updated every 3 months. The patient is aware of the potential for addiction and dependence.    The patient has read and signed the [facility] [employer] Substance Contract.  I will continue to see patient for regular follow up appointments.  They are well controlled on their medication.  EMILY is updated every 3 months. The patient is aware of the potential for addiction. The patient has read and signed the [facility] [employer] Substance Contract.  I will continue to see patient for regular follow up appointments.  They are well controlled on their medication.  EMILY is updated every 3 months. The patient is aware of the potential for addiction and dependence.on and dependence.

## 2017-01-27 DIAGNOSIS — E53.8 B12 DEFICIENCY: Primary | ICD-10-CM

## 2017-01-27 DIAGNOSIS — D50.8 IRON DEFICIENCY ANEMIA SECONDARY TO INADEQUATE DIETARY IRON INTAKE: ICD-10-CM

## 2017-01-31 ENCOUNTER — OFFICE VISIT (OUTPATIENT)
Dept: ONCOLOGY | Facility: CLINIC | Age: 47
End: 2017-01-31

## 2017-01-31 ENCOUNTER — APPOINTMENT (OUTPATIENT)
Dept: ONCOLOGY | Facility: CLINIC | Age: 47
End: 2017-01-31

## 2017-01-31 ENCOUNTER — LAB (OUTPATIENT)
Dept: ONCOLOGY | Facility: HOSPITAL | Age: 47
End: 2017-01-31

## 2017-01-31 ENCOUNTER — APPOINTMENT (OUTPATIENT)
Dept: ONCOLOGY | Facility: HOSPITAL | Age: 47
End: 2017-01-31

## 2017-01-31 VITALS
OXYGEN SATURATION: 97 % | SYSTOLIC BLOOD PRESSURE: 162 MMHG | HEIGHT: 64 IN | RESPIRATION RATE: 18 BRPM | DIASTOLIC BLOOD PRESSURE: 98 MMHG | TEMPERATURE: 98.4 F | BODY MASS INDEX: 50.02 KG/M2 | WEIGHT: 293 LBS | HEART RATE: 112 BPM

## 2017-01-31 DIAGNOSIS — E53.8 B12 DEFICIENCY: ICD-10-CM

## 2017-01-31 DIAGNOSIS — D50.0 IRON DEFICIENCY ANEMIA DUE TO CHRONIC BLOOD LOSS: ICD-10-CM

## 2017-01-31 DIAGNOSIS — D50.8 IRON DEFICIENCY ANEMIA SECONDARY TO INADEQUATE DIETARY IRON INTAKE: ICD-10-CM

## 2017-01-31 DIAGNOSIS — I26.99 OTHER ACUTE PULMONARY EMBOLISM WITHOUT ACUTE COR PULMONALE (HCC): Primary | ICD-10-CM

## 2017-01-31 LAB
BASOPHILS # BLD AUTO: 0.07 10*3/MM3 (ref 0–0.2)
BASOPHILS NFR BLD AUTO: 0.9 % (ref 0–1.5)
DEPRECATED RDW RBC AUTO: 53.4 FL (ref 37–54)
EOSINOPHIL # BLD AUTO: 0.34 10*3/MM3 (ref 0–0.7)
EOSINOPHIL NFR BLD AUTO: 4.4 % (ref 0.3–6.2)
ERYTHROCYTE [DISTWIDTH] IN BLOOD BY AUTOMATED COUNT: 16.4 % (ref 11.7–13)
FERRITIN SERPL-MCNC: 113.5 NG/ML (ref 13–150)
HCT VFR BLD AUTO: 42.3 % (ref 35.6–45.5)
HGB BLD-MCNC: 13.9 G/DL (ref 11.9–15.5)
IMM GRANULOCYTES # BLD: 0.03 10*3/MM3 (ref 0–0.03)
IMM GRANULOCYTES NFR BLD: 0.4 % (ref 0–0.5)
IRON 24H UR-MRATE: 60 MCG/DL (ref 37–145)
IRON SATN MFR SERPL: 13 % (ref 20–50)
LYMPHOCYTES # BLD AUTO: 1.55 10*3/MM3 (ref 0.9–4.8)
LYMPHOCYTES NFR BLD AUTO: 19.9 % (ref 19.6–45.3)
MCH RBC QN AUTO: 29.8 PG (ref 26.9–32)
MCHC RBC AUTO-ENTMCNC: 32.9 G/DL (ref 32.4–36.3)
MCV RBC AUTO: 90.6 FL (ref 80.5–98.2)
MONOCYTES # BLD AUTO: 0.4 10*3/MM3 (ref 0.2–1.2)
MONOCYTES NFR BLD AUTO: 5.1 % (ref 5–12)
NEUTROPHILS # BLD AUTO: 5.38 10*3/MM3 (ref 1.9–8.1)
NEUTROPHILS NFR BLD AUTO: 69.3 % (ref 42.7–76)
NRBC BLD MANUAL-RTO: 0 /100 WBC (ref 0–0)
PLATELET # BLD AUTO: 400 10*3/MM3 (ref 140–500)
PMV BLD AUTO: 9.1 FL (ref 6–12)
RBC # BLD AUTO: 4.67 10*6/MM3 (ref 3.9–5.2)
TIBC SERPL-MCNC: 472 MCG/DL (ref 298–536)
TRANSFERRIN SERPL-MCNC: 317 MG/DL (ref 200–360)
VIT B12 BLD-MCNC: 621 PG/ML (ref 211–946)
WBC NRBC COR # BLD: 7.77 10*3/MM3 (ref 4.5–10.7)

## 2017-01-31 PROCEDURE — 36415 COLL VENOUS BLD VENIPUNCTURE: CPT

## 2017-01-31 PROCEDURE — 82728 ASSAY OF FERRITIN: CPT | Performed by: INTERNAL MEDICINE

## 2017-01-31 PROCEDURE — 83540 ASSAY OF IRON: CPT | Performed by: INTERNAL MEDICINE

## 2017-01-31 PROCEDURE — 99214 OFFICE O/P EST MOD 30 MIN: CPT | Performed by: INTERNAL MEDICINE

## 2017-01-31 PROCEDURE — 82607 VITAMIN B-12: CPT | Performed by: INTERNAL MEDICINE

## 2017-01-31 PROCEDURE — 85025 COMPLETE CBC W/AUTO DIFF WBC: CPT | Performed by: INTERNAL MEDICINE

## 2017-01-31 PROCEDURE — 84466 ASSAY OF TRANSFERRIN: CPT | Performed by: INTERNAL MEDICINE

## 2017-01-31 RX ORDER — VILAZODONE HYDROCHLORIDE 40 MG/1
40 TABLET ORAL DAILY
COMMUNITY
End: 2019-07-02 | Stop reason: DRUGHIGH

## 2017-01-31 RX ORDER — DEXTROAMPHETAMINE SACCHARATE, AMPHETAMINE ASPARTATE, DEXTROAMPHETAMINE SULFATE AND AMPHETAMINE SULFATE 5; 5; 5; 5 MG/1; MG/1; MG/1; MG/1
20 TABLET ORAL DAILY
COMMUNITY

## 2017-01-31 RX ORDER — ZIPRASIDONE HYDROCHLORIDE 60 MG/1
60 CAPSULE ORAL DAILY
COMMUNITY

## 2017-02-11 RX ORDER — TIZANIDINE 4 MG/1
TABLET ORAL
Qty: 270 TABLET | Refills: 0 | Status: SHIPPED | OUTPATIENT
Start: 2017-02-11 | End: 2017-05-05 | Stop reason: SDUPTHER

## 2017-02-18 DIAGNOSIS — M54.31 SCIATICA OF RIGHT SIDE: ICD-10-CM

## 2017-02-20 ENCOUNTER — OFFICE VISIT (OUTPATIENT)
Dept: FAMILY MEDICINE CLINIC | Facility: CLINIC | Age: 47
End: 2017-02-20

## 2017-02-20 VITALS
WEIGHT: 293 LBS | OXYGEN SATURATION: 98 % | HEART RATE: 127 BPM | SYSTOLIC BLOOD PRESSURE: 140 MMHG | BODY MASS INDEX: 57.52 KG/M2 | DIASTOLIC BLOOD PRESSURE: 60 MMHG | HEIGHT: 60 IN | TEMPERATURE: 98.7 F

## 2017-02-20 DIAGNOSIS — M25.569 CHRONIC KNEE PAIN, UNSPECIFIED LATERALITY: Primary | ICD-10-CM

## 2017-02-20 DIAGNOSIS — G89.29 CHRONIC PAIN OF BOTH KNEES: Primary | ICD-10-CM

## 2017-02-20 DIAGNOSIS — M25.561 CHRONIC PAIN OF BOTH KNEES: Primary | ICD-10-CM

## 2017-02-20 DIAGNOSIS — M25.562 CHRONIC PAIN OF BOTH KNEES: Primary | ICD-10-CM

## 2017-02-20 DIAGNOSIS — G89.29 CHRONIC KNEE PAIN, UNSPECIFIED LATERALITY: Primary | ICD-10-CM

## 2017-02-20 PROCEDURE — 99213 OFFICE O/P EST LOW 20 MIN: CPT | Performed by: FAMILY MEDICINE

## 2017-02-20 RX ORDER — OXYCODONE AND ACETAMINOPHEN 7.5; 325 MG/1; MG/1
1 TABLET ORAL EVERY 8 HOURS PRN
Qty: 90 TABLET | Refills: 0 | Status: SHIPPED | OUTPATIENT
Start: 2017-02-20 | End: 2017-03-20 | Stop reason: SDUPTHER

## 2017-02-20 RX ORDER — GABAPENTIN 600 MG/1
TABLET ORAL
Qty: 270 TABLET | Refills: 0 | Status: SHIPPED | OUTPATIENT
Start: 2017-02-20 | End: 2017-07-31 | Stop reason: SDUPTHER

## 2017-02-20 NOTE — PROGRESS NOTES
Subjective   Radha Retana is a 46 y.o. female. CC: knee pain    History of Present Illness   Radha is a 46 year old female who comes in today for recheck on her knee pain.  She has been to pain management and received an injection.  She is scheduled to go back tomorrow.  Advised her to talk to Dr. Townsend about the medication and see if she would be willing to take over the script or give her something else.  Explained that I will no longer be prescribing pain medications.  She has seen Dr. Mccloud for follow up on the pulmonary emboli.  He took her off all the medications that she had been started on by the [facility] including the Victoza.  She had started to lose weight on it.  She has an appointment to see him at the end of March.      The following portions of the patient's history were reviewed and updated as appropriate: allergies, current medications, past medical history, past social history, past surgical history and problem list.    Review of Systems   Constitutional: Negative.  Negative for fatigue.   HENT: Negative.  Negative for congestion.    Respiratory: Negative.  Negative for cough, shortness of breath and wheezing.    Gastrointestinal: Negative.  Negative for abdominal pain, blood in stool, constipation, diarrhea, nausea and vomiting.   Genitourinary: Negative.  Negative for difficulty urinating and hematuria.   Musculoskeletal: Positive for arthralgias and back pain.   Skin: Negative.  Negative for rash.   Neurological: Negative.  Negative for dizziness, light-headedness and headaches.       Objective   Physical Exam   Constitutional: She is oriented to person, place, and time. She appears well-developed and well-nourished.   Cardiovascular: Normal rate, regular rhythm and normal heart sounds.    Pulmonary/Chest: Effort normal and breath sounds normal.   Musculoskeletal: She exhibits tenderness (knees).   Neurological: She is alert and oriented to person, place, and time.   Skin: Skin is  warm and dry. No rash noted.   Psychiatric: She has a normal mood and affect. Her behavior is normal.   Nursing note and vitals reviewed.    Vitals:    02/20/17 1001   BP: 140/60   Pulse: (!) 127   Temp: 98.7 °F (37.1 °C)   SpO2: 98%     Assessment/Plan   Problems Addressed this Visit        Other    Chronic pain of both knees - Primary    Relevant Medications    OxyCODONE HCl 7.5 MG tablet       Keep appointments with Dada Townsend and [facility].      The patient has read and signed the [facility] [employer] Substance Contract.  I will continue to see patient for regular follow up appointments.  They are well controlled on their medication.  EMILY is updated every 3 months. The patient is aware of the potential for addiction and dependence.    The patient has read and signed the [facility] [employer] Substance Contract.  I will continue to see patient for regular follow up appointments.  They are well controlled on their medication.  EMILY is updated every 3 months. The patient is aware of the potential for addiction. The patient has read and signed the [facility] [employer] Substance Contract.  I will continue to see patient for regular follow up appointments.  They are well controlled on their medication.  EMILY is updated every 3 months. The patient is aware of the potential for addiction and dependence.on and dependence.

## 2017-03-20 ENCOUNTER — OFFICE VISIT (OUTPATIENT)
Dept: FAMILY MEDICINE CLINIC | Facility: CLINIC | Age: 47
End: 2017-03-20

## 2017-03-20 VITALS
HEIGHT: 60 IN | HEART RATE: 122 BPM | OXYGEN SATURATION: 98 % | TEMPERATURE: 97.9 F | BODY MASS INDEX: 57.52 KG/M2 | SYSTOLIC BLOOD PRESSURE: 135 MMHG | DIASTOLIC BLOOD PRESSURE: 70 MMHG | WEIGHT: 293 LBS

## 2017-03-20 DIAGNOSIS — M25.569 CHRONIC KNEE PAIN, UNSPECIFIED LATERALITY: ICD-10-CM

## 2017-03-20 DIAGNOSIS — G89.29 CHRONIC KNEE PAIN, UNSPECIFIED LATERALITY: ICD-10-CM

## 2017-03-20 PROCEDURE — 99213 OFFICE O/P EST LOW 20 MIN: CPT | Performed by: FAMILY MEDICINE

## 2017-03-20 RX ORDER — OXYCODONE AND ACETAMINOPHEN 7.5; 325 MG/1; MG/1
1 TABLET ORAL EVERY 8 HOURS PRN
Qty: 90 TABLET | Refills: 0 | Status: SHIPPED | OUTPATIENT
Start: 2017-03-20 | End: 2017-04-20 | Stop reason: SDUPTHER

## 2017-03-20 NOTE — PROGRESS NOTES
Subjective   Radha Retana is a 47 y.o. female. CC: knee pain    History of Present Illness   Radha is a 47 year old female who comes in today for knee pain.  This has been a longstanding problem for her.  She has seen orthopedic surgeons but they all want her to lose weight first.  She continues to be followed by the [facility].  They are ruling out Cushing's syndrome right now.  She completed a 24 hour urine collection this morning.  She is requesting refill of pain medication.  Explained that this will be the last script as I will no longer be doing pain management.  She is saying pain management and should continue with that.        The following portions of the patient's history were reviewed and updated as appropriate: allergies, current medications, past medical history, past social history, past surgical history and problem list.    Review of Systems   Constitutional: Positive for unexpected weight change. Negative for fatigue.   HENT: Negative.  Negative for congestion.    Respiratory: Negative.  Negative for cough, shortness of breath and wheezing.    Cardiovascular: Negative.  Negative for chest pain, palpitations and leg swelling.   Gastrointestinal: Negative.  Negative for abdominal pain and blood in stool.   Genitourinary: Negative.  Negative for difficulty urinating and flank pain.   Musculoskeletal: Positive for arthralgias and back pain.   Skin: Negative.  Negative for rash.   Neurological: Negative.  Negative for dizziness and numbness.   Psychiatric/Behavioral: The patient is nervous/anxious.        Objective   Physical Exam   Constitutional: She appears well-developed and well-nourished.   Cardiovascular: Normal rate, regular rhythm and normal heart sounds.    No murmur heard.  Pulmonary/Chest: Effort normal and breath sounds normal. No respiratory distress.   Musculoskeletal: She exhibits tenderness (knees).   Skin: Skin is warm and dry. No rash noted.   Psychiatric: She has a normal  mood and affect. Her behavior is normal.   Nursing note and vitals reviewed.    Vitals:    03/20/17 0959   BP: 135/70   Pulse: (!) 122   Temp: 97.9 °F (36.6 °C)   SpO2: 98%     Assessment/Plan   Problems Addressed this Visit        Musculoskeletal and Integument    Knee pain    Relevant Medications    oxyCODONE-acetaminophen (PERCOCET) 7.5-325 MG per tablet        Explained to her that I will no longer be doing pain medications.  She is going to discuss this with her pain management doctor.  She will schedule an appointment to see her.    The patient has read and signed the [facility] [employer] Substance Contract.  I will continue to see patient for regular follow up appointments.  They are well controlled on their medication.  EMILY is updated every 3 months. The patient is aware of the potential for addiction and dependence.    The patient has read and signed the [facility] [employer] Substance Contract.  I will continue to see patient for regular follow up appointments.  They are well controlled on their medication.  EMILY is updated every 3 months. The patient is aware of the potential for addiction. The patient has read and signed the [facility] [employer] Substance Contract.  I will continue to see patient for regular follow up appointments.  They are well controlled on their medication.  EMILY is updated every 3 months. The patient is aware of the potential for addiction and dependence.on and dependence.

## 2017-03-22 ENCOUNTER — HOSPITAL ENCOUNTER (OUTPATIENT)
Dept: CT IMAGING | Facility: HOSPITAL | Age: 47
Discharge: HOME | End: 2017-03-22
Attending: INTERNAL MEDICINE

## 2017-03-22 ENCOUNTER — APPOINTMENT (OUTPATIENT)
Dept: CARDIOLOGY | Facility: HOSPITAL | Age: 47
End: 2017-03-22
Attending: INTERNAL MEDICINE

## 2017-03-28 ENCOUNTER — APPOINTMENT (OUTPATIENT)
Dept: ONCOLOGY | Facility: CLINIC | Age: 47
End: 2017-03-28

## 2017-03-29 ENCOUNTER — HOSPITAL ENCOUNTER (OUTPATIENT)
Dept: CARDIOLOGY | Facility: HOSPITAL | Age: 47
Discharge: HOME | End: 2017-03-29
Attending: INTERNAL MEDICINE

## 2017-03-29 ENCOUNTER — HOSPITAL ENCOUNTER (OUTPATIENT)
Dept: CT IMAGING | Facility: HOSPITAL | Age: 47
Discharge: HOME | End: 2017-03-29
Attending: INTERNAL MEDICINE | Admitting: INTERNAL MEDICINE

## 2017-03-29 DIAGNOSIS — E53.8 B12 DEFICIENCY: ICD-10-CM

## 2017-03-29 DIAGNOSIS — I26.99 OTHER ACUTE PULMONARY EMBOLISM WITHOUT ACUTE COR PULMONALE (HCC): ICD-10-CM

## 2017-03-29 DIAGNOSIS — D50.0 IRON DEFICIENCY ANEMIA DUE TO CHRONIC BLOOD LOSS: ICD-10-CM

## 2017-03-29 LAB
BASOPHILS # BLD AUTO: 0.07 10*3/MM3 (ref 0–0.2)
BASOPHILS NFR BLD AUTO: 1.4 % (ref 0–1.5)
BH CV LOWER VASCULAR LEFT COMMON FEMORAL AUGMENT: NORMAL
BH CV LOWER VASCULAR LEFT COMMON FEMORAL COMPETENT: NORMAL
BH CV LOWER VASCULAR LEFT COMMON FEMORAL COMPRESS: NORMAL
BH CV LOWER VASCULAR LEFT COMMON FEMORAL PHASIC: NORMAL
BH CV LOWER VASCULAR LEFT COMMON FEMORAL SPONT: NORMAL
BH CV LOWER VASCULAR LEFT DISTAL FEMORAL COMPRESS: NORMAL
BH CV LOWER VASCULAR LEFT GASTRONEMIUS COMPRESS: NORMAL
BH CV LOWER VASCULAR LEFT GREATER SAPH AK COMPRESS: NORMAL
BH CV LOWER VASCULAR LEFT GREATER SAPH BK COMPRESS: NORMAL
BH CV LOWER VASCULAR LEFT MID FEMORAL AUGMENT: NORMAL
BH CV LOWER VASCULAR LEFT MID FEMORAL COMPETENT: NORMAL
BH CV LOWER VASCULAR LEFT MID FEMORAL COMPRESS: NORMAL
BH CV LOWER VASCULAR LEFT MID FEMORAL PHASIC: NORMAL
BH CV LOWER VASCULAR LEFT MID FEMORAL SPONT: NORMAL
BH CV LOWER VASCULAR LEFT POPLITEAL AUGMENT: NORMAL
BH CV LOWER VASCULAR LEFT POPLITEAL COMPETENT: NORMAL
BH CV LOWER VASCULAR LEFT POPLITEAL COMPRESS: NORMAL
BH CV LOWER VASCULAR LEFT POPLITEAL PHASIC: NORMAL
BH CV LOWER VASCULAR LEFT POPLITEAL SPONT: NORMAL
BH CV LOWER VASCULAR LEFT POSTERIOR TIBIAL COMPRESS: NORMAL
BH CV LOWER VASCULAR LEFT PROXIMAL FEMORAL COMPRESS: NORMAL
BH CV LOWER VASCULAR LEFT SAPHENOFEMORAL JUNCTION AUGMENT: NORMAL
BH CV LOWER VASCULAR LEFT SAPHENOFEMORAL JUNCTION COMPETENT: NORMAL
BH CV LOWER VASCULAR LEFT SAPHENOFEMORAL JUNCTION COMPRESS: NORMAL
BH CV LOWER VASCULAR LEFT SAPHENOFEMORAL JUNCTION PHASIC: NORMAL
BH CV LOWER VASCULAR LEFT SAPHENOFEMORAL JUNCTION SPONT: NORMAL
BH CV LOWER VASCULAR RIGHT COMMON FEMORAL AUGMENT: NORMAL
BH CV LOWER VASCULAR RIGHT COMMON FEMORAL COMPETENT: NORMAL
BH CV LOWER VASCULAR RIGHT COMMON FEMORAL COMPRESS: NORMAL
BH CV LOWER VASCULAR RIGHT COMMON FEMORAL PHASIC: NORMAL
BH CV LOWER VASCULAR RIGHT COMMON FEMORAL SPONT: NORMAL
BH CV LOWER VASCULAR RIGHT DISTAL FEMORAL COMPRESS: NORMAL
BH CV LOWER VASCULAR RIGHT GASTRONEMIUS COMPRESS: NORMAL
BH CV LOWER VASCULAR RIGHT GREATER SAPH AK COMPRESS: NORMAL
BH CV LOWER VASCULAR RIGHT GREATER SAPH BK COMPRESS: NORMAL
BH CV LOWER VASCULAR RIGHT MID FEMORAL AUGMENT: NORMAL
BH CV LOWER VASCULAR RIGHT MID FEMORAL COMPETENT: NORMAL
BH CV LOWER VASCULAR RIGHT MID FEMORAL COMPRESS: NORMAL
BH CV LOWER VASCULAR RIGHT MID FEMORAL PHASIC: NORMAL
BH CV LOWER VASCULAR RIGHT MID FEMORAL SPONT: NORMAL
BH CV LOWER VASCULAR RIGHT PERONEAL COMPRESS: NORMAL
BH CV LOWER VASCULAR RIGHT POPLITEAL AUGMENT: NORMAL
BH CV LOWER VASCULAR RIGHT POPLITEAL COMPETENT: NORMAL
BH CV LOWER VASCULAR RIGHT POPLITEAL COMPRESS: NORMAL
BH CV LOWER VASCULAR RIGHT POPLITEAL PHASIC: NORMAL
BH CV LOWER VASCULAR RIGHT POPLITEAL SPONT: NORMAL
BH CV LOWER VASCULAR RIGHT POSTERIOR TIBIAL COMPRESS: NORMAL
BH CV LOWER VASCULAR RIGHT PROXIMAL FEMORAL COMPRESS: NORMAL
BH CV LOWER VASCULAR RIGHT SAPHENOFEMORAL JUNCTION AUGMENT: NORMAL
BH CV LOWER VASCULAR RIGHT SAPHENOFEMORAL JUNCTION COMPETENT: NORMAL
BH CV LOWER VASCULAR RIGHT SAPHENOFEMORAL JUNCTION COMPRESS: NORMAL
BH CV LOWER VASCULAR RIGHT SAPHENOFEMORAL JUNCTION PHASIC: NORMAL
BH CV LOWER VASCULAR RIGHT SAPHENOFEMORAL JUNCTION SPONT: NORMAL
CRP SERPL-MCNC: 1.13 MG/DL (ref 0–0.5)
DEPRECATED RDW RBC AUTO: 45 FL (ref 37–54)
EOSINOPHIL # BLD AUTO: 0.29 10*3/MM3 (ref 0–0.7)
EOSINOPHIL NFR BLD AUTO: 5.7 % (ref 0.3–6.2)
ERYTHROCYTE [DISTWIDTH] IN BLOOD BY AUTOMATED COUNT: 13.7 % (ref 11.7–13)
HCT VFR BLD AUTO: 40 % (ref 35.6–45.5)
HGB BLD-MCNC: 13.4 G/DL (ref 11.9–15.5)
IMM GRANULOCYTES # BLD: 0.03 10*3/MM3 (ref 0–0.03)
IMM GRANULOCYTES NFR BLD: 0.6 % (ref 0–0.5)
LYMPHOCYTES # BLD AUTO: 1.82 10*3/MM3 (ref 0.9–4.8)
LYMPHOCYTES NFR BLD AUTO: 36 % (ref 19.6–45.3)
MCH RBC QN AUTO: 30.2 PG (ref 26.9–32)
MCHC RBC AUTO-ENTMCNC: 33.5 G/DL (ref 32.4–36.3)
MCV RBC AUTO: 90.1 FL (ref 80.5–98.2)
MONOCYTES # BLD AUTO: 0.4 10*3/MM3 (ref 0.2–1.2)
MONOCYTES NFR BLD AUTO: 7.9 % (ref 5–12)
NEUTROPHILS # BLD AUTO: 2.45 10*3/MM3 (ref 1.9–8.1)
NEUTROPHILS NFR BLD AUTO: 48.4 % (ref 42.7–76)
NRBC BLD MANUAL-RTO: 0 /100 WBC (ref 0–0)
PLATELET # BLD AUTO: 363 10*3/MM3 (ref 140–500)
PMV BLD AUTO: 9.4 FL (ref 6–12)
RBC # BLD AUTO: 4.44 10*6/MM3 (ref 3.9–5.2)
WBC NRBC COR # BLD: 5.06 10*3/MM3 (ref 4.5–10.7)

## 2017-03-29 PROCEDURE — 86140 C-REACTIVE PROTEIN: CPT | Performed by: INTERNAL MEDICINE

## 2017-03-29 PROCEDURE — 0 DIATRIZOATE MEGLUMINE & SODIUM PER 1 ML: Performed by: INTERNAL MEDICINE

## 2017-03-29 PROCEDURE — 93970 EXTREMITY STUDY: CPT

## 2017-03-29 PROCEDURE — 82565 ASSAY OF CREATININE: CPT

## 2017-03-29 PROCEDURE — 74177 CT ABD & PELVIS W/CONTRAST: CPT

## 2017-03-29 PROCEDURE — 85025 COMPLETE CBC W/AUTO DIFF WBC: CPT | Performed by: INTERNAL MEDICINE

## 2017-03-29 PROCEDURE — 71275 CT ANGIOGRAPHY CHEST: CPT

## 2017-03-29 PROCEDURE — 0 IOPAMIDOL PER 1 ML: Performed by: INTERNAL MEDICINE

## 2017-03-29 RX ADMIN — IOPAMIDOL 96 ML: 755 INJECTION, SOLUTION INTRAVENOUS at 09:19

## 2017-03-29 RX ADMIN — DIATRIZOATE MEGLUMINE AND DIATRIZOATE SODIUM 30 ML: 660; 100 LIQUID ORAL; RECTAL at 07:50

## 2017-03-30 LAB — CREAT BLDA-MCNC: 0.7 MG/DL (ref 0.6–1.3)

## 2017-04-20 ENCOUNTER — OFFICE VISIT (OUTPATIENT)
Dept: FAMILY MEDICINE CLINIC | Facility: CLINIC | Age: 47
End: 2017-04-20

## 2017-04-20 VITALS
BODY MASS INDEX: 57.52 KG/M2 | HEIGHT: 60 IN | TEMPERATURE: 98.1 F | HEART RATE: 117 BPM | SYSTOLIC BLOOD PRESSURE: 145 MMHG | OXYGEN SATURATION: 97 % | WEIGHT: 293 LBS | DIASTOLIC BLOOD PRESSURE: 110 MMHG

## 2017-04-20 DIAGNOSIS — R03.0 HIGH BLOOD PRESSURE (NOT HYPERTENSION): Primary | ICD-10-CM

## 2017-04-20 DIAGNOSIS — M25.569 CHRONIC KNEE PAIN, UNSPECIFIED LATERALITY: ICD-10-CM

## 2017-04-20 DIAGNOSIS — G89.29 CHRONIC KNEE PAIN, UNSPECIFIED LATERALITY: ICD-10-CM

## 2017-04-20 PROCEDURE — 99213 OFFICE O/P EST LOW 20 MIN: CPT | Performed by: FAMILY MEDICINE

## 2017-04-20 RX ORDER — OXYCODONE AND ACETAMINOPHEN 7.5; 325 MG/1; MG/1
1 TABLET ORAL EVERY 8 HOURS PRN
Qty: 45 TABLET | Refills: 0 | Status: SHIPPED | OUTPATIENT
Start: 2017-04-20 | End: 2017-10-26 | Stop reason: HOSPADM

## 2017-04-20 NOTE — PROGRESS NOTES
Subjective   Radha Retana is a 47 y.o. female. CC: knee pain    History of Present Illness   Radha is a 47 year old female who comes in today for knee pain which has been a longstanding chronic problem for her.  For years she has been told that she needed knee replacement but has not been able to because of her weight.  We had discussed at the last visit that I will no longer be prescribing the pain medication. Her pain doctor is out on maternity leave.  Has an appointment to see her on 5/1.    Her blood pressure is very high today.  She states that she has never had problems with her blood pressure in the past.  Both of her parents had hypertension.  On questioning about any changes she states that she has been taking at least 4 Benadryl a day for her allergies.    Still being evaluated by endocrinologist for Cushing's.  Hasn't received the results yet.        The following portions of the patient's history were reviewed and updated as appropriate: allergies, current medications, past medical history, past social history, past surgical history and problem list.    Review of Systems   Constitutional: Negative.  Negative for fatigue.   HENT: Negative.  Negative for congestion.    Respiratory: Negative.  Negative for cough, shortness of breath and wheezing.    Cardiovascular: Negative.  Negative for chest pain, palpitations and leg swelling.   Gastrointestinal: Negative.  Negative for abdominal pain and nausea.   Genitourinary: Negative.  Negative for difficulty urinating.   Musculoskeletal: Positive for arthralgias. Negative for back pain.   Skin: Negative.  Negative for rash.   Neurological: Negative.  Negative for dizziness, light-headedness and headaches.   Psychiatric/Behavioral: Negative.  Negative for dysphoric mood and sleep disturbance. The patient is not nervous/anxious.        Objective   Physical Exam   Constitutional: She is oriented to person, place, and time. She appears well-developed and  well-nourished.   Cardiovascular: Normal rate, regular rhythm and normal heart sounds.    No murmur heard.  Pulmonary/Chest: Effort normal and breath sounds normal. No respiratory distress.   Musculoskeletal: She exhibits tenderness (knees tender).   Neurological: She is alert and oriented to person, place, and time.   Skin: Skin is warm and dry. No rash noted.   Psychiatric: She has a normal mood and affect. Her behavior is normal.   Nursing note and vitals reviewed.    Vitals:    04/20/17 1043   BP: (!) 145/110   Pulse: 117   Temp: 98.1 °F (36.7 °C)   SpO2: 97%     Assessment/Plan   Problems Addressed this Visit        Musculoskeletal and Integument    Knee pain    Relevant Medications    oxyCODONE-acetaminophen (PERCOCET) 7.5-325 MG per tablet      Other Visit Diagnoses     High blood pressure (not hypertension)    -  Primary      She is to discontinue the Benadryl.  Her daughter can monitor her blood pressure and she will let me know if it remains elevated.  She is not having any symptoms.  No chest pain, dizziness, headaches.    EMILY obtained today and is appropriate.    The patient has read and signed the [facility] [employer] Substance Contract.  I will continue to see patient for regular follow up appointments.  They are well controlled on their medication.  EMILY is updated every 3 months. The patient is aware of the potential for addiction and dependence.    The patient has read and signed the [facility] Controlled Substance Contract.  I will continue to see patient for regular follow up appointments.  They are well controlled on their medication.  EMILY is updated every 3 months. The patient is aware of the potential for addiction. The patient has read and signed the [facility] Controlled Substance Contract.  I will continue to see patient for regular follow up appointments.  They are well controlled on their medication.  EMILY is updated every 3 months. The patient is aware of the  potential for addiction and dependence.on and dependence.    Discussed with her that she cannot be under contract to two different physicians for pain medications.  I was unaware that she was getting scripts from pain management until today.  I will not prescribe any further pain medications to her.

## 2017-04-21 ENCOUNTER — APPOINTMENT (OUTPATIENT)
Dept: LAB | Facility: HOSPITAL | Age: 47
End: 2017-04-21

## 2017-04-21 ENCOUNTER — APPOINTMENT (OUTPATIENT)
Dept: ONCOLOGY | Facility: CLINIC | Age: 47
End: 2017-04-21

## 2017-04-26 ENCOUNTER — TELEPHONE (OUTPATIENT)
Dept: ONCOLOGY | Facility: CLINIC | Age: 47
End: 2017-04-26

## 2017-04-26 NOTE — TELEPHONE ENCOUNTER
----- Message from Meena Keys sent at 4/26/2017 10:44 AM EDT -----  Regarding: missed lab and scan  5/2 appt

## 2017-05-02 ENCOUNTER — APPOINTMENT (OUTPATIENT)
Dept: ONCOLOGY | Facility: CLINIC | Age: 47
End: 2017-05-02

## 2017-05-02 ENCOUNTER — APPOINTMENT (OUTPATIENT)
Dept: LAB | Facility: HOSPITAL | Age: 47
End: 2017-05-02

## 2017-05-06 ENCOUNTER — APPOINTMENT (OUTPATIENT)
Dept: CT IMAGING | Facility: HOSPITAL | Age: 47
End: 2017-05-06

## 2017-05-06 ENCOUNTER — APPOINTMENT (OUTPATIENT)
Dept: GENERAL RADIOLOGY | Facility: HOSPITAL | Age: 47
End: 2017-05-06

## 2017-05-06 ENCOUNTER — HOSPITAL ENCOUNTER (EMERGENCY)
Facility: HOSPITAL | Age: 47
Discharge: HOME | End: 2017-05-06
Attending: EMERGENCY MEDICINE | Admitting: EMERGENCY MEDICINE

## 2017-05-06 VITALS
WEIGHT: 293 LBS | HEART RATE: 91 BPM | TEMPERATURE: 98.4 F | RESPIRATION RATE: 16 BRPM | OXYGEN SATURATION: 100 % | DIASTOLIC BLOOD PRESSURE: 83 MMHG | BODY MASS INDEX: 51.91 KG/M2 | SYSTOLIC BLOOD PRESSURE: 161 MMHG | HEIGHT: 63 IN

## 2017-05-06 DIAGNOSIS — S16.1XXA CERVICAL STRAIN, INITIAL ENCOUNTER: ICD-10-CM
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
## 2017-05-06 DIAGNOSIS — S00.93XA CONTUSION OF HEAD, UNSPECIFIED PART OF HEAD, INITIAL ENCOUNTER: Primary | ICD-10-CM

## 2017-05-06 DIAGNOSIS — S29.019A THORACIC MYOFASCIAL STRAIN, INITIAL ENCOUNTER: ICD-10-CM

## 2017-05-06 PROCEDURE — 70450 CT HEAD/BRAIN W/O DYE: CPT

## 2017-05-06 PROCEDURE — 72125 CT NECK SPINE W/O DYE: CPT

## 2017-05-06 PROCEDURE — 72072 X-RAY EXAM THORAC SPINE 3VWS: CPT

## 2017-05-06 PROCEDURE — 99284 EMERGENCY DEPT VISIT MOD MDM: CPT

## 2017-05-06 RX ORDER — HYDROCODONE BITARTRATE AND ACETAMINOPHEN 5; 325 MG/1; MG/1
1 TABLET ORAL EVERY 4 HOURS PRN
Qty: 12 TABLET | Refills: 0 | Status: SHIPPED | OUTPATIENT
Start: 2017-05-06 | End: 2017-10-26 | Stop reason: HOSPADM

## 2017-05-06 RX ORDER — HYDROCODONE BITARTRATE AND ACETAMINOPHEN 7.5; 325 MG/1; MG/1
1 TABLET ORAL ONCE
Status: COMPLETED | OUTPATIENT
Start: 2017-05-06 | End: 2017-05-06

## 2017-05-06 RX ORDER — BACLOFEN 10 MG/1
10 TABLET ORAL ONCE
Status: COMPLETED | OUTPATIENT
Start: 2017-05-06 | End: 2017-05-06

## 2017-05-06 RX ADMIN — HYDROCODONE BITARTRATE AND ACETAMINOPHEN 1 TABLET: 7.5; 325 TABLET ORAL at 15:15

## 2017-05-06 RX ADMIN — BACLOFEN 10 MG: 10 TABLET ORAL at 15:15

## 2017-05-08 RX ORDER — TIZANIDINE 4 MG/1
TABLET ORAL
Qty: 270 TABLET | Refills: 0 | Status: SHIPPED | OUTPATIENT
Start: 2017-05-08 | End: 2017-08-10 | Stop reason: SDUPTHER

## 2017-05-11 ENCOUNTER — TELEPHONE (OUTPATIENT)
Dept: SOCIAL WORK | Facility: HOSPITAL | Age: 47
End: 2017-05-11

## 2017-05-17 ENCOUNTER — TELEPHONE (OUTPATIENT)
Dept: FAMILY MEDICINE CLINIC | Facility: CLINIC | Age: 47
End: 2017-05-17

## 2017-05-17 DIAGNOSIS — I10 ESSENTIAL HYPERTENSION: ICD-10-CM

## 2017-05-17 DIAGNOSIS — I10 ESSENTIAL HYPERTENSION: Primary | ICD-10-CM

## 2017-05-17 RX ORDER — LISINOPRIL 10 MG/1
10 TABLET ORAL DAILY
Qty: 30 TABLET | Refills: 5 | Status: SHIPPED | OUTPATIENT
Start: 2017-05-17 | End: 2017-05-17 | Stop reason: SDUPTHER

## 2017-05-18 RX ORDER — LISINOPRIL 10 MG/1
TABLET ORAL
Qty: 90 TABLET | Refills: 5 | Status: SHIPPED | OUTPATIENT
Start: 2017-05-18 | End: 2019-07-02 | Stop reason: DRUGHIGH

## 2017-07-31 DIAGNOSIS — M54.31 SCIATICA OF RIGHT SIDE: ICD-10-CM

## 2017-08-01 RX ORDER — GABAPENTIN 600 MG/1
TABLET ORAL
Qty: 270 TABLET | Refills: 0 | OUTPATIENT
Start: 2017-08-01 | End: 2017-10-26 | Stop reason: HOSPADM

## 2017-08-10 RX ORDER — TIZANIDINE 4 MG/1
TABLET ORAL
Qty: 270 TABLET | Refills: 0 | Status: SHIPPED | OUTPATIENT
Start: 2017-08-10 | End: 2017-10-06 | Stop reason: SDUPTHER

## 2017-10-08 RX ORDER — TIZANIDINE 4 MG/1
TABLET ORAL
Qty: 270 TABLET | Refills: 1 | Status: SHIPPED | OUTPATIENT
Start: 2017-10-08

## 2017-10-24 ENCOUNTER — APPOINTMENT (OUTPATIENT)
Dept: CT IMAGING | Facility: HOSPITAL | Age: 47
End: 2017-10-24

## 2017-10-24 ENCOUNTER — HOSPITAL ENCOUNTER (OUTPATIENT)
Facility: HOSPITAL | Age: 47
Setting detail: OBSERVATION
Discharge: HOME | End: 2017-10-26
Attending: EMERGENCY MEDICINE | Admitting: INTERNAL MEDICINE

## 2017-10-24 ENCOUNTER — APPOINTMENT (OUTPATIENT)
Dept: GENERAL RADIOLOGY | Facility: HOSPITAL | Age: 47
End: 2017-10-24

## 2017-10-24 DIAGNOSIS — T50.901A ACCIDENTAL OVERDOSE, INITIAL ENCOUNTER: ICD-10-CM

## 2017-10-24 DIAGNOSIS — R55 SYNCOPE AND COLLAPSE: Primary | ICD-10-CM

## 2017-10-24 DIAGNOSIS — I95.9 HYPOTENSION, UNSPECIFIED HYPOTENSION TYPE: ICD-10-CM

## 2017-10-24 LAB
ALBUMIN SERPL-MCNC: 3.7 G/DL (ref 3.5–5.2)
ALBUMIN/GLOB SERPL: 1.1 G/DL
ALP SERPL-CCNC: 92 U/L (ref 39–117)
ALT SERPL W P-5'-P-CCNC: 13 U/L (ref 1–33)
AMPHET+METHAMPHET UR QL: NEGATIVE
ANION GAP SERPL CALCULATED.3IONS-SCNC: 12.6 MMOL/L
AST SERPL-CCNC: 13 U/L (ref 1–32)
BARBITURATES UR QL SCN: NEGATIVE
BASOPHILS # BLD AUTO: 0.04 10*3/MM3 (ref 0–0.2)
BASOPHILS NFR BLD AUTO: 0.4 % (ref 0–1.5)
BENZODIAZ UR QL SCN: NEGATIVE
BILIRUB SERPL-MCNC: 0.3 MG/DL (ref 0.1–1.2)
BILIRUB UR QL STRIP: NEGATIVE
BUN BLD-MCNC: 10 MG/DL (ref 6–20)
BUN/CREAT SERPL: 6.9 (ref 7–25)
CALCIUM SPEC-SCNC: 8.5 MG/DL (ref 8.6–10.5)
CANNABINOIDS SERPL QL: NEGATIVE
CHLORIDE SERPL-SCNC: 98 MMOL/L (ref 98–107)
CLARITY UR: ABNORMAL
CO2 SERPL-SCNC: 27.4 MMOL/L (ref 22–29)
COCAINE UR QL: NEGATIVE
COLOR UR: YELLOW
CREAT BLD-MCNC: 1.45 MG/DL (ref 0.57–1)
D DIMER PPP FEU-MCNC: <0.27 MCGFEU/ML (ref 0–0.49)
DEPRECATED RDW RBC AUTO: 45.8 FL (ref 37–54)
EOSINOPHIL # BLD AUTO: 0.21 10*3/MM3 (ref 0–0.7)
EOSINOPHIL NFR BLD AUTO: 2.2 % (ref 0.3–6.2)
ERYTHROCYTE [DISTWIDTH] IN BLOOD BY AUTOMATED COUNT: 13.2 % (ref 11.7–13)
GFR SERPL CREATININE-BSD FRML MDRD: 39 ML/MIN/1.73
GLOBULIN UR ELPH-MCNC: 3.5 GM/DL
GLUCOSE BLD-MCNC: 109 MG/DL (ref 65–99)
GLUCOSE UR STRIP-MCNC: NEGATIVE MG/DL
HCG SERPL QL: NEGATIVE
HCT VFR BLD AUTO: 41.1 % (ref 35.6–45.5)
HGB BLD-MCNC: 13.8 G/DL (ref 11.9–15.5)
HGB UR QL STRIP.AUTO: NEGATIVE
IMM GRANULOCYTES # BLD: 0.02 10*3/MM3 (ref 0–0.03)
IMM GRANULOCYTES NFR BLD: 0.2 % (ref 0–0.5)
INR PPP: 1.02 (ref 0.9–1.1)
KETONES UR QL STRIP: NEGATIVE
LEUKOCYTE ESTERASE UR QL STRIP.AUTO: NEGATIVE
LYMPHOCYTES # BLD AUTO: 2.23 10*3/MM3 (ref 0.9–4.8)
LYMPHOCYTES NFR BLD AUTO: 23.7 % (ref 19.6–45.3)
MAGNESIUM SERPL-MCNC: 2.5 MG/DL (ref 1.6–2.6)
MCH RBC QN AUTO: 31.7 PG (ref 26.9–32)
MCHC RBC AUTO-ENTMCNC: 33.6 G/DL (ref 32.4–36.3)
MCV RBC AUTO: 94.5 FL (ref 80.5–98.2)
METHADONE UR QL SCN: NEGATIVE
MONOCYTES # BLD AUTO: 0.52 10*3/MM3 (ref 0.2–1.2)
MONOCYTES NFR BLD AUTO: 5.5 % (ref 5–12)
NEUTROPHILS # BLD AUTO: 6.37 10*3/MM3 (ref 1.9–8.1)
NEUTROPHILS NFR BLD AUTO: 68 % (ref 42.7–76)
NITRITE UR QL STRIP: NEGATIVE
OPIATES UR QL: NEGATIVE
OXYCODONE UR QL SCN: NEGATIVE
PH UR STRIP.AUTO: 7.5 [PH] (ref 5–8)
PLATELET # BLD AUTO: 379 10*3/MM3 (ref 140–500)
PMV BLD AUTO: 10.3 FL (ref 6–12)
POTASSIUM BLD-SCNC: 3.8 MMOL/L (ref 3.5–5.2)
PROT SERPL-MCNC: 7.2 G/DL (ref 6–8.5)
PROT UR QL STRIP: NEGATIVE
PROTHROMBIN TIME: 13 SECONDS (ref 11.7–14.2)
RBC # BLD AUTO: 4.35 10*6/MM3 (ref 3.9–5.2)
SODIUM BLD-SCNC: 138 MMOL/L (ref 136–145)
SP GR UR STRIP: <1.005 (ref 1–1.03)
T4 FREE SERPL-MCNC: 0.98 NG/DL (ref 0.93–1.7)
TROPONIN T SERPL-MCNC: <0.01 NG/ML (ref 0–0.03)
TSH SERPL DL<=0.05 MIU/L-ACNC: 2.36 MIU/ML (ref 0.27–4.2)
UROBILINOGEN UR QL STRIP: ABNORMAL
WBC NRBC COR # BLD: 9.39 10*3/MM3 (ref 4.5–10.7)

## 2017-10-24 PROCEDURE — 80307 DRUG TEST PRSMV CHEM ANLYZR: CPT | Performed by: EMERGENCY MEDICINE

## 2017-10-24 PROCEDURE — 93010 ELECTROCARDIOGRAM REPORT: CPT | Performed by: INTERNAL MEDICINE

## 2017-10-24 PROCEDURE — 81003 URINALYSIS AUTO W/O SCOPE: CPT | Performed by: EMERGENCY MEDICINE

## 2017-10-24 PROCEDURE — 80053 COMPREHEN METABOLIC PANEL: CPT | Performed by: EMERGENCY MEDICINE

## 2017-10-24 PROCEDURE — G0378 HOSPITAL OBSERVATION PER HR: HCPCS

## 2017-10-24 PROCEDURE — 83735 ASSAY OF MAGNESIUM: CPT | Performed by: EMERGENCY MEDICINE

## 2017-10-24 PROCEDURE — 84703 CHORIONIC GONADOTROPIN ASSAY: CPT | Performed by: EMERGENCY MEDICINE

## 2017-10-24 PROCEDURE — 70450 CT HEAD/BRAIN W/O DYE: CPT

## 2017-10-24 PROCEDURE — 85025 COMPLETE CBC W/AUTO DIFF WBC: CPT | Performed by: EMERGENCY MEDICINE

## 2017-10-24 PROCEDURE — 85610 PROTHROMBIN TIME: CPT | Performed by: EMERGENCY MEDICINE

## 2017-10-24 PROCEDURE — 93005 ELECTROCARDIOGRAM TRACING: CPT

## 2017-10-24 PROCEDURE — 71010 HC CHEST PA OR AP: CPT

## 2017-10-24 PROCEDURE — 96360 HYDRATION IV INFUSION INIT: CPT

## 2017-10-24 PROCEDURE — 84443 ASSAY THYROID STIM HORMONE: CPT | Performed by: EMERGENCY MEDICINE

## 2017-10-24 PROCEDURE — 85379 FIBRIN DEGRADATION QUANT: CPT | Performed by: EMERGENCY MEDICINE

## 2017-10-24 PROCEDURE — 99285 EMERGENCY DEPT VISIT HI MDM: CPT

## 2017-10-24 PROCEDURE — 84439 ASSAY OF FREE THYROXINE: CPT | Performed by: EMERGENCY MEDICINE

## 2017-10-24 PROCEDURE — 96361 HYDRATE IV INFUSION ADD-ON: CPT

## 2017-10-24 PROCEDURE — 84484 ASSAY OF TROPONIN QUANT: CPT | Performed by: EMERGENCY MEDICINE

## 2017-10-24 RX ORDER — ONDANSETRON 4 MG/1
4 TABLET, ORALLY DISINTEGRATING ORAL EVERY 6 HOURS PRN
Status: DISCONTINUED | OUTPATIENT
Start: 2017-10-24 | End: 2017-10-26 | Stop reason: HOSPADM

## 2017-10-24 RX ORDER — ZIPRASIDONE HYDROCHLORIDE 20 MG/1
60 CAPSULE ORAL DAILY
Status: DISCONTINUED | OUTPATIENT
Start: 2017-10-25 | End: 2017-10-25

## 2017-10-24 RX ORDER — CLONAZEPAM 1 MG/1
0.5 TABLET ORAL 3 TIMES DAILY PRN
Status: DISCONTINUED | OUTPATIENT
Start: 2017-10-24 | End: 2017-10-26

## 2017-10-24 RX ORDER — BUDESONIDE AND FORMOTEROL FUMARATE DIHYDRATE 160; 4.5 UG/1; UG/1
2 AEROSOL RESPIRATORY (INHALATION)
Status: DISCONTINUED | OUTPATIENT
Start: 2017-10-25 | End: 2017-10-26 | Stop reason: HOSPADM

## 2017-10-24 RX ORDER — SODIUM CHLORIDE 0.9 % (FLUSH) 0.9 %
1-10 SYRINGE (ML) INJECTION AS NEEDED
Status: DISCONTINUED | OUTPATIENT
Start: 2017-10-24 | End: 2017-10-26 | Stop reason: HOSPADM

## 2017-10-24 RX ORDER — SENNA AND DOCUSATE SODIUM 50; 8.6 MG/1; MG/1
2 TABLET, FILM COATED ORAL NIGHTLY PRN
Status: DISCONTINUED | OUTPATIENT
Start: 2017-10-24 | End: 2017-10-26 | Stop reason: HOSPADM

## 2017-10-24 RX ORDER — VILAZODONE HYDROCHLORIDE 40 MG/1
40 TABLET ORAL DAILY
Status: DISCONTINUED | OUTPATIENT
Start: 2017-10-25 | End: 2017-10-26 | Stop reason: HOSPADM

## 2017-10-24 RX ORDER — ONDANSETRON 4 MG/1
4 TABLET, FILM COATED ORAL EVERY 6 HOURS PRN
Status: DISCONTINUED | OUTPATIENT
Start: 2017-10-24 | End: 2017-10-26 | Stop reason: HOSPADM

## 2017-10-24 RX ORDER — ALBUTEROL SULFATE 2.5 MG/3ML
2.5 SOLUTION RESPIRATORY (INHALATION) EVERY 6 HOURS PRN
Status: DISCONTINUED | OUTPATIENT
Start: 2017-10-24 | End: 2017-10-26 | Stop reason: HOSPADM

## 2017-10-24 RX ORDER — ONDANSETRON 2 MG/ML
4 INJECTION INTRAMUSCULAR; INTRAVENOUS EVERY 6 HOURS PRN
Status: DISCONTINUED | OUTPATIENT
Start: 2017-10-24 | End: 2017-10-26 | Stop reason: HOSPADM

## 2017-10-24 RX ORDER — PREGABALIN 75 MG/1
75 CAPSULE ORAL 2 TIMES DAILY
COMMUNITY
End: 2019-07-02 | Stop reason: DRUGHIGH

## 2017-10-24 RX ORDER — NITROGLYCERIN 0.4 MG/1
0.4 TABLET SUBLINGUAL
Status: DISCONTINUED | OUTPATIENT
Start: 2017-10-24 | End: 2017-10-26 | Stop reason: HOSPADM

## 2017-10-24 RX ORDER — ACETAMINOPHEN 325 MG/1
650 TABLET ORAL EVERY 4 HOURS PRN
Status: DISCONTINUED | OUTPATIENT
Start: 2017-10-24 | End: 2017-10-26 | Stop reason: HOSPADM

## 2017-10-24 RX ORDER — SODIUM CHLORIDE 9 MG/ML
125 INJECTION, SOLUTION INTRAVENOUS CONTINUOUS
Status: DISCONTINUED | OUTPATIENT
Start: 2017-10-24 | End: 2017-10-26 | Stop reason: HOSPADM

## 2017-10-24 RX ORDER — CHOLECALCIFEROL (VITAMIN D3) 125 MCG
500 CAPSULE ORAL DAILY
Status: DISCONTINUED | OUTPATIENT
Start: 2017-10-25 | End: 2017-10-26 | Stop reason: HOSPADM

## 2017-10-24 RX ADMIN — SODIUM CHLORIDE 1000 ML: 9 INJECTION, SOLUTION INTRAVENOUS at 17:55

## 2017-10-24 RX ADMIN — SODIUM CHLORIDE 125 ML/HR: 9 INJECTION, SOLUTION INTRAVENOUS at 17:47

## 2017-10-24 RX ADMIN — SODIUM CHLORIDE 125 ML/HR: 9 INJECTION, SOLUTION INTRAVENOUS at 22:18

## 2017-10-24 RX ADMIN — SODIUM CHLORIDE 1000 ML: 9 INJECTION, SOLUTION INTRAVENOUS at 20:24

## 2017-10-24 RX ADMIN — ACETAMINOPHEN 650 MG: 325 TABLET ORAL at 23:45

## 2017-10-24 NOTE — ED PROVIDER NOTES
EMERGENCY DEPARTMENT ENCOUNTER    CHIEF COMPLAINT  Chief Complaint: syncope  History given by: Pt  History limited by: N/A  Room Number: 521/1  PMD: Michelle Escalante MD      HPI:  Pt is a 47 y.o. female who presents via EMS after a 2 minute episode of syncope at around 1530 while having a bowel movement. Pt states she took all of her BP medications at 1 pm and then took a nap on her couch. When she awoke, she felt like going to the bathroom and got up and was dizzy. She was assisted to the bathroom, and had a bowel movement before she lost consciousness and hit her head with the wall. Family contacted EMS, and the patient started to recover consciousness before the paramedics arrived. Pt's BP was 91/55 at initially. She is not on any anitcoagulants, and denies any SOB or chest pain at this time.     Duration:  2 hours ago  Onset: sudden  Timin episode PTA  Intensity/Severity: moderate  Progression: resolved  Associated Symptoms: dizziness  Aggravating Factors: none  Alleviating Factors: none  Previous Episodes: No history of similar symptoms.   Treatment before arrival: No treatment PTA.     PAST MEDICAL HISTORY  Active Ambulatory Problems     Diagnosis Date Noted   • Anxiety 02/15/2016   • Depression 02/15/2016   • Knee pain 02/15/2016   • Low back pain 02/15/2016   • Psoriasis 02/15/2016   • Sciatica 02/15/2016   • Morbid obesity due to excess calories 2016   • Disorder of lactation 2016   • Pituitary adenoma 2016   • Chronic pain of both knees 2016   • Pulmonary embolism without acute cor pulmonale 2017   • B12 deficiency 2017   • Iron deficiency anemia 2017     Resolved Ambulatory Problems     Diagnosis Date Noted   • Acute respiratory failure with hypoxia 2017     Past Medical History:   Diagnosis Date   • Anxiety    • Arthritis    • Bipolar depression    • Deep vein thrombosis    • Depression    • Hyperprolinemia    • Iron deficiency anemia 2017   •  Low back pain    • Migraine    • Neuromuscular disorder    • Obesity    • Pituitary adenoma    • Pituitary tumor    • Pulmonary embolism without acute cor pulmonale 2017       PAST SURGICAL HISTORY  Past Surgical History:   Procedure Laterality Date   • BACK SURGERY     • BARIATRIC SURGERY     •  SECTION     • CHOLECYSTECTOMY     • DILATION AND CURETTAGE, DIAGNOSTIC / THERAPEUTIC         FAMILY HISTORY  Family History   Problem Relation Age of Onset   • Heart disease Mother    • Hypertension Mother    • Bipolar disorder Mother    • Heart disease Father    • Diabetes Father    • Alcohol abuse Brother    • Asthma Daughter    • Alcohol abuse Brother    • Bipolar disorder Brother        SOCIAL HISTORY  Social History     Social History   • Marital status:      Spouse name: Santy   • Number of children: N/A   • Years of education: N/A     Occupational History   • DISABLED      Social History Main Topics   • Smoking status: Former Smoker   • Smokeless tobacco: Never Used   • Alcohol use Yes      Comment: socially   • Drug use: No   • Sexual activity: Defer     Other Topics Concern   • Not on file     Social History Narrative       ALLERGIES  Adhesive tape; Penicillins; Morphine; and Sulfa antibiotics    REVIEW OF SYSTEMS  Review of Systems   Constitutional: Negative for fever.   HENT: Negative for sore throat.    Eyes: Negative.    Respiratory: Negative for cough and shortness of breath.    Cardiovascular: Negative for chest pain.   Gastrointestinal: Negative for abdominal pain, diarrhea and vomiting.   Genitourinary: Negative for dysuria.   Musculoskeletal: Negative for neck pain.   Skin: Negative for rash.   Allergic/Immunologic: Negative.    Neurological: Positive for dizziness and syncope. Negative for weakness, numbness and headaches.   Hematological: Negative.    Psychiatric/Behavioral: Negative.    All other systems reviewed and are negative.      PHYSICAL EXAM  ED Triage Vitals   Temp Heart  Rate Resp BP SpO2   10/24/17 1637 10/24/17 1636 10/24/17 1636 10/24/17 1636 10/24/17 1636   97.6 °F (36.4 °C) 62 18 96/40 96 %      Temp src Heart Rate Source Patient Position BP Location FiO2 (%)   10/24/17 1637 10/24/17 1636 10/24/17 1636 -- --   Tympanic Monitor Lying         Physical Exam   Constitutional: She is oriented to person, place, and time and well-developed, well-nourished, and in no distress. No distress.   HENT:   Head: Normocephalic and atraumatic.   Mouth/Throat: Mucous membranes are dry.   Eyes: EOM are normal. Pupils are equal, round, and reactive to light.   Neck: Normal range of motion. Neck supple.   Cardiovascular: Normal rate, regular rhythm and normal heart sounds.  Exam reveals no gallop.    No murmur heard.  Heart rate is right at 60.    Pulmonary/Chest: Effort normal and breath sounds normal. No respiratory distress.   Abdominal: Soft. Bowel sounds are normal. She exhibits no distension. There is no tenderness. There is no rebound and no guarding.   Musculoskeletal: Normal range of motion. She exhibits no edema.   No pedal edema, no calf tenderness.    Neurological: She is alert and oriented to person, place, and time. She has normal sensation and normal strength.   Skin: Skin is warm and dry. No rash noted. There is pallor.   Psychiatric: Mood and affect normal.   Nursing note and vitals reviewed.      LAB RESULTS  Lab Results (last 24 hours)     Procedure Component Value Units Date/Time    CBC & Differential [[id number]] Collected:  10/24/17 1754    Specimen:  Blood Updated:  10/24/17 1806    Narrative:       The following orders were created for panel order CBC & Differential.  Procedure                               Abnormality         Status                     ---------                               -----------         ------                     CBC Auto Differential[[id number]]        Abnormal            Final result                 Please view results for these tests on the  individual orders.    Comprehensive Metabolic Panel [[id number]]  (Abnormal) Collected:  10/24/17 1754    Specimen:  Blood Updated:  10/24/17 1830     Glucose 109 (H) mg/dL      BUN 10 mg/dL      Creatinine 1.45 (H) mg/dL      Sodium 138 mmol/L      Potassium 3.8 mmol/L      Chloride 98 mmol/L      CO2 27.4 mmol/L      Calcium 8.5 (L) mg/dL      Total Protein 7.2 g/dL      Albumin 3.70 g/dL      ALT (SGPT) 13 U/L      AST (SGOT) 13 U/L      Alkaline Phosphatase 92 U/L      Total Bilirubin 0.3 mg/dL      eGFR Non African Amer 39 (L) mL/min/1.73      Globulin 3.5 gm/dL      A/G Ratio 1.1 g/dL      BUN/Creatinine Ratio 6.9 (L)     Anion Gap 12.6 mmol/L     Protime-INR [[id number]]  (Normal) Collected:  10/24/17 1754    Specimen:  Blood Updated:  10/24/17 1822     Protime 13.0 Seconds      INR 1.02    Troponin [[id number]]  (Normal) Collected:  10/24/17 1754    Specimen:  Blood Updated:  10/24/17 1832     Troponin T <0.010 ng/mL     Narrative:       Troponin T Reference Ranges:  Less than 0.03 ng/mL:    Negative for AMI  0.03 to 0.09 ng/mL:      Indeterminant for AMI  Greater than 0.09 ng/mL: Positive for AMI    D-dimer, Quantitative [[id number]]  (Normal) Collected:  10/24/17 1754    Specimen:  Blood Updated:  10/24/17 1822     D-Dimer, Quantitative <0.27 MCGFEU/mL     Narrative:       The [employer] D-Dimer test used in conjunction with a clinical pretest probability (PTP) assessment model, has been approved by the FDA to rule out the presence of venous thromboembolism (VTE) in outpatients suspected of deep venous thrombosis (DVT) or pulmonary embolism (PE).     hCG, Serum, Qualitative [[id number]]  (Normal) Collected:  10/24/17 1754    Specimen:  Blood Updated:  10/24/17 1828     HCG Qualitative Negative    Magnesium [[id number]]  (Normal) Collected:  10/24/17 1754    Specimen:  Blood Updated:  10/24/17 1830     Magnesium 2.5 mg/dL     T4, Free [[id number]]  (Normal) Collected:  10/24/17 1754    Specimen:  Blood Updated:  10/24/17  1837     Free T4 0.98 ng/dL     TSH [[id number]]  (Normal) Collected:  10/24/17 1754    Specimen:  Blood Updated:  10/24/17 1837     TSH 2.360 mIU/mL     CBC Auto Differential [[id number]]  (Abnormal) Collected:  10/24/17 1754    Specimen:  Blood Updated:  10/24/17 1806     WBC 9.39 10*3/mm3      RBC 4.35 10*6/mm3      Hemoglobin 13.8 g/dL      Hematocrit 41.1 %      MCV 94.5 fL      MCH 31.7 pg      MCHC 33.6 g/dL      RDW 13.2 (H) %      RDW-SD 45.8 fl      MPV 10.3 fL      Platelets 379 10*3/mm3      Neutrophil % 68.0 %      Lymphocyte % 23.7 %      Monocyte % 5.5 %      Eosinophil % 2.2 %      Basophil % 0.4 %      Immature Grans % 0.2 %      Neutrophils, Absolute 6.37 10*3/mm3      Lymphocytes, Absolute 2.23 10*3/mm3      Monocytes, Absolute 0.52 10*3/mm3      Eosinophils, Absolute 0.21 10*3/mm3      Basophils, Absolute 0.04 10*3/mm3      Immature Grans, Absolute 0.02 10*3/mm3     Urinalysis With / Culture If Indicated - Urine, Clean Catch [[id number]]  (Abnormal) Collected:  10/24/17 1904    Specimen:  Urine from Urine, Clean Catch Updated:  10/24/17 1939     Color, UA Yellow     Appearance, UA Cloudy (A)     pH, UA 7.5     Specific Gravity, UA <1.005 (L)     Glucose, UA Negative     Ketones, UA Negative     Bilirubin, UA Negative     Blood, UA Negative     Protein, UA Negative     Leuk Esterase, UA Negative     Nitrite, UA Negative     Urobilinogen, UA >=8.0 E.U./dL (A)    Narrative:       Urine microscopic not indicated.    Urine Drug Screen - Urine, Clean Catch [[id number]]  (Normal) Collected:  10/24/17 1904    Specimen:  Urine from Urine, Clean Catch Updated:  10/24/17 1959     Amphet/Methamphet, Screen Negative     Barbiturates Screen, Urine Negative     Benzodiazepine Screen, Urine Negative     Cocaine Screen, Urine Negative     Opiate Screen Negative     THC, Screen, Urine Negative     Methadone Screen, Urine Negative     Oxycodone Screen, Urine Negative    Narrative:       Negative Thresholds For Drugs  Screened:     Amphetamines               500 ng/ml   Barbiturates               200 ng/ml   Benzodiazepines            100 ng/ml   Cocaine                    300 ng/ml   Methadone                  300 ng/ml   Opiates                    300 ng/ml   Oxycodone                  100 ng/ml   THC                        50 ng/ml    The Normal Value for all drugs tested is negative. This report includes final unconfirmed screening results to be used for medical treatment purposes only. Unconfirmed results must not be used for non-medical purposes such as employment or legal testing. Clinical consideration should be applied to any drug of abuse test, particulary when unconfirmed results are used.          I ordered the above labs and reviewed the results    RADIOLOGY  CT Head Without Contrast   Final Result   No acute process identified. Further evaluation could be   performed with a MRI examination of the brain as indicated.               Radiation dose reduction techniques were utilized, including automated   exposure control and exposure modulation based on body size.       This report was finalized on 10/24/2017 8:31 PM by Dr. Hugh العلي MD.          XR Chest 1 View   Final Result   No focal pulmonary consolidation. Follow-up as clinical   indications persist.       This report was finalized on 10/24/2017 6:46 PM by Dr. Bereket Lozano MD.               I ordered the above noted radiological studies. Interpreted by radiologist. Reviewed by me in PACS.       PROCEDURES  Procedures  EKG    EKG time: 1659  Rhythm/Rate: NSR, 59  No Acute Ischemia  Non-Specific ST-T changes    Unchanged compared to prior on January, 2017.    Interpreted Contemporaneously by me.  Independently viewed by me      PROGRESS AND CONSULTS  ED Course     1651  EKG was ordered for further evaluation.   1745  Ordered blood work including CBC, hCG, D-dimer, Troponin, UA, Magnesium, T4, TSH, and CMP, and EKG,  XR chest and CT head for further  evaluation.   2013  RN reported the pt's BP decreased after 1 liter of IV fluids, but the pt became hypotensive again after the fluids were stopped. Pt will be given second litter of fluids.     2142  Rechecked pt who is resting comfortably. She admits to taking all 3 of her BP medications at 1300 this afternoon before she went to sleep. I informed pt her CT head, XR chest, and WBC were all negative, and plan to give her IV fluids and admit her to the hospital until her blood pressure is stabilized. She understands and agrees with plan.    2145  Ordered continuation of IVF bolus. Placed consult to [facility].    2208  Discussed pt with  (hospitalist), who will admit to Telemetry.     MEDICAL DECISION MAKING  Results were reviewed/discussed with the patient and they were also made aware of online access. Pt also made aware that some labs, such as cultures, will not be resulted during ER visit and follow up with PMD is necessary.     MDM  Number of Diagnoses or Management Options  Accidental overdose of bood pressure medicine:   Hypotension, unspecified hypotension type:   Syncope and collapse:   Diagnosis management comments: Pt is unstable for transfer, due to persistent hypotension from accidental anti-hypertensive overdose, despite aggressive IV fluid therapy. Thus will admit to a monitor bed and initiate 30 CCs per kilo IV bolus therapy.         Amount and/or Complexity of Data Reviewed  Clinical lab tests: reviewed and ordered (WBC is negative. )  Tests in the radiology section of CPT®: reviewed and ordered (CT head- showed nothing acute.   XR chest - showed no active disease. )  Tests in the medicine section of CPT®: ordered and reviewed (See note. )  Decide to obtain previous medical records or to obtain history from someone other than the patient: yes  Discuss the patient with other providers: yes ( (hospitalist))  Independent visualization of images, tracings, or specimens: yes    Patient  Progress  Patient progress: stable         DIAGNOSIS  Final diagnoses:   Syncope and collapse   Hypotension, unspecified hypotension type   Accidental overdose of bood pressure medicine       DISPOSITION  ADMISSION    Discussed treatment plan and reason for admission with pt/family and admitting physician.  Pt/family voiced understanding of the plan for admission for further testing/treatment as needed.         Latest Documented Vital Signs:  As of 11:01 PM  BP- 95/45 HR- 58 Temp- 97.6 °F (36.4 °C) (Tympanic) O2 sat- 97%    --  Documentation assistance provided by artemio Alejandre for Dr.Gregory Riley MD.  Information recorded by the scribe was done at my direction and has been verified and validated by me.     Coco Obrien  10/24/17 1854       Coco Obrien  10/24/17 2155       Coco Obrien  10/24/17 2225       Trae Lin MD  10/24/17 2306

## 2017-10-25 PROBLEM — T50.905A MEDICATION ADVERSE EFFECT, INITIAL ENCOUNTER: Status: ACTIVE | Noted: 2017-10-25

## 2017-10-25 PROBLEM — R19.7 DIARRHEA: Status: ACTIVE | Noted: 2017-10-25

## 2017-10-25 PROBLEM — I10 ESSENTIAL HYPERTENSION: Status: ACTIVE | Noted: 2017-10-25

## 2017-10-25 LAB
AMPHET+METHAMPHET UR QL: NEGATIVE
ANION GAP SERPL CALCULATED.3IONS-SCNC: 14.2 MMOL/L
B-HCG UR QL: NEGATIVE
BARBITURATES UR QL SCN: NEGATIVE
BASOPHILS # BLD AUTO: 0.03 10*3/MM3 (ref 0–0.2)
BASOPHILS NFR BLD AUTO: 0.4 % (ref 0–1.5)
BENZODIAZ UR QL SCN: NEGATIVE
BUN BLD-MCNC: 11 MG/DL (ref 6–20)
BUN/CREAT SERPL: 10.4 (ref 7–25)
CA-I BLD-MCNC: 4.4 MG/DL (ref 4.6–5.4)
CA-I SERPL ISE-MCNC: 1.1 MMOL/L (ref 1.1–1.35)
CALCIUM SPEC-SCNC: 7.7 MG/DL (ref 8.6–10.5)
CANNABINOIDS SERPL QL: NEGATIVE
CHLORIDE SERPL-SCNC: 101 MMOL/L (ref 98–107)
CO2 SERPL-SCNC: 21.8 MMOL/L (ref 22–29)
COCAINE UR QL: NEGATIVE
CREAT BLD-MCNC: 1.06 MG/DL (ref 0.57–1)
DEPRECATED RDW RBC AUTO: 46.5 FL (ref 37–54)
EOSINOPHIL # BLD AUTO: 0.17 10*3/MM3 (ref 0–0.7)
EOSINOPHIL NFR BLD AUTO: 2.1 % (ref 0.3–6.2)
ERYTHROCYTE [DISTWIDTH] IN BLOOD BY AUTOMATED COUNT: 13.5 % (ref 11.7–13)
ETHANOL BLD-MCNC: <10 MG/DL (ref 0–10)
ETHANOL UR QL: <0.01 %
GFR SERPL CREATININE-BSD FRML MDRD: 55 ML/MIN/1.73
GLUCOSE BLD-MCNC: 101 MG/DL (ref 65–99)
HCT VFR BLD AUTO: 39.2 % (ref 35.6–45.5)
HGB BLD-MCNC: 12.9 G/DL (ref 11.9–15.5)
IMM GRANULOCYTES # BLD: 0.02 10*3/MM3 (ref 0–0.03)
IMM GRANULOCYTES NFR BLD: 0.2 % (ref 0–0.5)
LYMPHOCYTES # BLD AUTO: 1.8 10*3/MM3 (ref 0.9–4.8)
LYMPHOCYTES NFR BLD AUTO: 22.2 % (ref 19.6–45.3)
MCH RBC QN AUTO: 31.3 PG (ref 26.9–32)
MCHC RBC AUTO-ENTMCNC: 32.9 G/DL (ref 32.4–36.3)
MCV RBC AUTO: 95.1 FL (ref 80.5–98.2)
METHADONE UR QL SCN: NEGATIVE
MONOCYTES # BLD AUTO: 0.61 10*3/MM3 (ref 0.2–1.2)
MONOCYTES NFR BLD AUTO: 7.5 % (ref 5–12)
NEUTROPHILS # BLD AUTO: 5.47 10*3/MM3 (ref 1.9–8.1)
NEUTROPHILS NFR BLD AUTO: 67.6 % (ref 42.7–76)
OPIATES UR QL: NEGATIVE
OXYCODONE UR QL SCN: NEGATIVE
PHOSPHATE SERPL-MCNC: 2.5 MG/DL (ref 2.5–4.5)
PLATELET # BLD AUTO: 270 10*3/MM3 (ref 140–500)
PMV BLD AUTO: 10 FL (ref 6–12)
POTASSIUM BLD-SCNC: 3.5 MMOL/L (ref 3.5–5.2)
RBC # BLD AUTO: 4.12 10*6/MM3 (ref 3.9–5.2)
SODIUM BLD-SCNC: 137 MMOL/L (ref 136–145)
WBC NRBC COR # BLD: 8.1 10*3/MM3 (ref 4.5–10.7)

## 2017-10-25 PROCEDURE — 84100 ASSAY OF PHOSPHORUS: CPT | Performed by: HOSPITALIST

## 2017-10-25 PROCEDURE — 96361 HYDRATE IV INFUSION ADD-ON: CPT

## 2017-10-25 PROCEDURE — 85025 COMPLETE CBC W/AUTO DIFF WBC: CPT | Performed by: INTERNAL MEDICINE

## 2017-10-25 PROCEDURE — G0378 HOSPITAL OBSERVATION PER HR: HCPCS

## 2017-10-25 PROCEDURE — 81025 URINE PREGNANCY TEST: CPT | Performed by: HOSPITALIST

## 2017-10-25 PROCEDURE — 80307 DRUG TEST PRSMV CHEM ANLYZR: CPT | Performed by: HOSPITALIST

## 2017-10-25 PROCEDURE — 80048 BASIC METABOLIC PNL TOTAL CA: CPT | Performed by: INTERNAL MEDICINE

## 2017-10-25 PROCEDURE — 93010 ELECTROCARDIOGRAM REPORT: CPT | Performed by: INTERNAL MEDICINE

## 2017-10-25 PROCEDURE — 93005 ELECTROCARDIOGRAM TRACING: CPT | Performed by: INTERNAL MEDICINE

## 2017-10-25 PROCEDURE — 82330 ASSAY OF CALCIUM: CPT | Performed by: HOSPITALIST

## 2017-10-25 RX ORDER — TIZANIDINE 4 MG/1
4 TABLET ORAL EVERY 8 HOURS PRN
Status: DISCONTINUED | OUTPATIENT
Start: 2017-10-25 | End: 2017-10-25

## 2017-10-25 RX ORDER — ZIPRASIDONE HYDROCHLORIDE 20 MG/1
60 CAPSULE ORAL NIGHTLY
Status: DISCONTINUED | OUTPATIENT
Start: 2017-10-25 | End: 2017-10-26 | Stop reason: HOSPADM

## 2017-10-25 RX ORDER — TIZANIDINE 4 MG/1
4 TABLET ORAL ONCE
Status: COMPLETED | OUTPATIENT
Start: 2017-10-25 | End: 2017-10-25

## 2017-10-25 RX ORDER — DABIGATRAN ETEXILATE 150 MG/1
150 CAPSULE ORAL EVERY 12 HOURS SCHEDULED
Status: DISCONTINUED | OUTPATIENT
Start: 2017-10-25 | End: 2017-10-25

## 2017-10-25 RX ADMIN — SODIUM CHLORIDE 125 ML/HR: 9 INJECTION, SOLUTION INTRAVENOUS at 22:04

## 2017-10-25 RX ADMIN — PREGABALIN 75 MG: 50 CAPSULE ORAL at 17:53

## 2017-10-25 RX ADMIN — ZIPRASIDONE HCL 60 MG: 20 CAPSULE ORAL at 22:04

## 2017-10-25 RX ADMIN — CLONAZEPAM 0.5 MG: 1 TABLET ORAL at 16:04

## 2017-10-25 RX ADMIN — PREGABALIN 75 MG: 50 CAPSULE ORAL at 08:21

## 2017-10-25 RX ADMIN — CLONAZEPAM 0.5 MG: 1 TABLET ORAL at 05:46

## 2017-10-25 RX ADMIN — SODIUM CHLORIDE 125 ML/HR: 9 INJECTION, SOLUTION INTRAVENOUS at 14:16

## 2017-10-25 RX ADMIN — SODIUM CHLORIDE 125 ML/HR: 9 INJECTION, SOLUTION INTRAVENOUS at 06:00

## 2017-10-25 RX ADMIN — TIZANIDINE 4 MG: 4 TABLET ORAL at 10:03

## 2017-10-25 RX ADMIN — SODIUM CHLORIDE 125 ML/HR: 9 INJECTION, SOLUTION INTRAVENOUS at 23:00

## 2017-10-25 RX ADMIN — VILAZODONE HYDROCHLORIDE 40 MG: 40 TABLET ORAL at 08:09

## 2017-10-25 RX ADMIN — CYANOCOBALAMIN TAB 500 MCG 500 MCG: 500 TAB at 08:13

## 2017-10-25 NOTE — H&P
Name: Radha Retana ADMIT: 10/24/2017   : 1970  PCP: Michelle Escalante MD    MRN: [id number] LOS: 1 days   AGE/SEX: 47 y.o. female  ROOM: 521/1     Chief Complaint   Patient presents with   • Syncope     Pt hasn't taken BP meds in awhile, took them today and had a syncopal episode while having an epsiode of diarrhea.        Subjective   Ms. Retana is a 47 y.o. female who presents to [facility] complaining of syncopal episode that occurred while straining using restroom. She reports that she awoke late this morning, took her entire days worth of BP medication at once, and took nap. Awoke from nap and had bowel movement with nonbloody diarrhea. Then she became clammy and feinted. She hit head on toilet and remained unconscious for about 1-2 minutes. Daughter did not see any convulsions. She awoke without incontinence, tongue biting, or postictal confusion. She denies CP or palpitations. No SOA or NV. In ER she was hypotensive and required multiple boluses but was fluid responsive. She has no headache now, vision changes, or weakness/numbness.    History of Present Illness    Past Medical History:   Diagnosis Date   • Anxiety    • Arthritis    • Bipolar depression    • Deep vein thrombosis    • Depression    • Essential hypertension 10/25/2017   • Hyperprolinemia    • Iron deficiency anemia 2017   • Low back pain    • Migraine    • Neuromuscular disorder    • Obesity    • Pituitary adenoma    • Pituitary tumor    • Pulmonary embolism without acute cor pulmonale 2017     Past Surgical History:   Procedure Laterality Date   • BACK SURGERY     • BARIATRIC SURGERY     •  SECTION     • CHOLECYSTECTOMY     • DILATION AND CURETTAGE, DIAGNOSTIC / THERAPEUTIC       Family History   Problem Relation Age of Onset   • Heart disease Mother    • Hypertension Mother    • Bipolar disorder Mother    • Heart disease Father    • Diabetes Father    • Alcohol abuse Brother    • Asthma  Daughter    • Alcohol abuse Brother    • Bipolar disorder Brother      Social History   Substance Use Topics   • Smoking status: Former Smoker   • Smokeless tobacco: Never Used   • Alcohol use Yes      Comment: socially     Prescriptions Prior to Admission   Medication Sig Dispense Refill Last Dose   • Albuterol (PROVENTIL IN) Inhale As Needed.   Past Week at Unknown time   • amphetamine-dextroamphetamine (ADDERALL) 20 MG tablet Take 20 mg by mouth Daily.   10/23/2017 at Unknown time   • budesonide-formoterol (SYMBICORT) 160-4.5 MCG/ACT inhaler Inhale 2 puffs 2 (Two) Times a Day. 1 inhaler 0 Past Week at Unknown time   • clonazePAM (KlonoPIN) 2 MG tablet Take 0.5 mg by mouth 3 (Three) Times a Day As Needed (sometimes takes both at night).   10/23/2017 at Unknown time   • lisdexamfetamine (VYVANSE) 30 MG capsule Take 30 mg by mouth Daily.   10/23/2017 at Unknown time   • lisinopril (PRINIVIL,ZESTRIL) 10 MG tablet TAKE 1 TABLET BY MOUTH DAILY 90 tablet 5 10/24/2017 at Unknown time   • metoprolol tartrate (LOPRESSOR) 25 MG tablet Take 25 mg by mouth 2 (Two) Times a Day.   10/24/2017 at Unknown time   • pregabalin (LYRICA) 75 MG capsule Take 75 mg by mouth 2 (Two) Times a Day.   10/24/2017 at Unknown time   • tiZANidine (ZANAFLEX) 4 MG tablet TAKE 1 TABLET BY MOUTH THREE TIMES DAILY 270 tablet 1 10/24/2017 at Unknown time   • vilazodone (VIIBRYD) 40 MG tablet tablet Take 40 mg by mouth Daily.   10/24/2017 at Unknown time   • ziprasidone (GEODON) 60 MG capsule Take 60 mg by mouth Daily.   10/23/2017 at Unknown time   • cyanocobalamin (VITAMIN B-12) 500 MCG tablet Take 1 tablet by mouth Daily. 30 tablet 0 More than a month at Unknown time   • dabigatran etexilate (PRADAXA) 150 MG capsu Take 1 capsule by mouth Every 12 (Twelve) Hours. 60 capsule 0 Taking   • gabapentin (NEURONTIN) 600 MG tablet TAKE 1 TABLET BY MOUTH THREE TIMES DAILY 270 tablet 0    • HYDROcodone-acetaminophen (NORCO) 5-325 MG per tablet Take 1 tablet by  mouth Every 4 (Four) Hours As Needed for Moderate Pain (4-6). 12 tablet 0    • oxyCODONE-acetaminophen (PERCOCET) 7.5-325 MG per tablet Take 1 tablet by mouth Every 8 (Eight) Hours As Needed for Moderate Pain (4-6). 45 tablet 0      Allergies:  Adhesive tape; Penicillins; Morphine; and Sulfa antibiotics    Review of Systems   Constitutional: Negative for diaphoresis and fever.   HENT: Negative for sore throat and trouble swallowing.    Eyes: Negative for pain and visual disturbance.   Respiratory: Negative for cough and shortness of breath.    Cardiovascular: Negative for chest pain, palpitations and leg swelling.   Gastrointestinal: Positive for diarrhea. Negative for constipation, nausea and vomiting.   Endocrine: Negative for cold intolerance and heat intolerance.   Genitourinary: Negative for difficulty urinating and dysuria.   Musculoskeletal: Negative for neck pain and neck stiffness.   Skin: Negative for pallor and rash.   Allergic/Immunologic: Negative for environmental allergies and food allergies.   Neurological: Positive for syncope. Negative for seizures.   Hematological: Negative for adenopathy.   Psychiatric/Behavioral: Negative for agitation and confusion.        Objective    Vital Signs  Temp:  [97.5 °F (36.4 °C)-97.6 °F (36.4 °C)] 97.5 °F (36.4 °C)  Heart Rate:  [56-66] 66  Resp:  [18] 18  BP: ()/(31-74) 115/57  SpO2:  [95 %-100 %] 100 %  on   ;   O2 Device: room air  Body mass index is 63.06 kg/(m^2).    Physical Exam   Constitutional: She is oriented to person, place, and time. She appears well-developed and well-nourished. No distress.   HENT:   Head: Normocephalic and atraumatic.   Nose: Nose normal.   Eyes: Conjunctivae and EOM are normal. Pupils are equal, round, and reactive to light.   Neck: Neck supple.   Cardiovascular: Normal rate, regular rhythm, normal heart sounds and intact distal pulses.    No murmur heard.  Pulmonary/Chest: Effort normal. She has decreased breath sounds. She  has no wheezes. She has no rhonchi. She has no rales.   Abdominal: Soft. Bowel sounds are normal. She exhibits no distension. There is no tenderness. There is no rebound and no guarding.   Musculoskeletal: Normal range of motion. She exhibits edema.   Neurological: She is alert and oriented to person, place, and time. No cranial nerve deficit. She exhibits normal muscle tone.   Skin: Skin is warm and dry. She is not diaphoretic.   Psychiatric: She has a normal mood and affect. Her behavior is normal.   Nursing note and vitals reviewed.      Results Review:   I reviewed the patient's new clinical results. Reviewed imaging, agree with interpretation. Reviewed EKG, sinus rhythm, no heart block or st changes. Reviewed prior records.    Results from last 7 days  Lab Units 10/24/17  1754   WBC 10*3/mm3 9.39   HEMOGLOBIN g/dL 13.8   PLATELETS 10*3/mm3 379     Results from last 7 days  Lab Units 10/24/17  1754   SODIUM mmol/L 138   POTASSIUM mmol/L 3.8   CHLORIDE mmol/L 98   CO2 mmol/L 27.4   BUN mg/dL 10   CREATININE mg/dL 1.45*   GLUCOSE mg/dL 109*   ALBUMIN g/dL 3.70   BILIRUBIN mg/dL 0.3   ALK PHOS U/L 92   AST (SGOT) U/L 13   ALT (SGPT) U/L 13   Estimated Creatinine Clearance: 71.8 mL/min (by C-G formula based on Cr of 1.45).  Results from last 7 days  Lab Units 10/24/17  1754   INR  1.02   TROPONIN T ng/mL <0.010       Results from last 7 days  Lab Units 10/24/17  1904   NITRITE UA  Negative       CT Head Without Contrast   Final Result   No acute process identified. Further evaluation could be   performed with a MRI examination of the brain as indicated.               Radiation dose reduction techniques were utilized, including automated   exposure control and exposure modulation based on body size.       This report was finalized on 10/24/2017 8:31 PM by Dr. Hugh العلي MD.          XR Chest 1 View   Final Result   No focal pulmonary consolidation. Follow-up as clinical   indications persist.       This report  was finalized on 10/24/2017 6:46 PM by Dr. Bereket Lozano MD.            Assessment/Plan   Assessment:     Active Hospital Problems (** Indicates Principal Problem)    Diagnosis Date Noted   • **Syncope and collapse [R55] 10/24/2017   • Diarrhea [R19.7] 10/25/2017   • Essential hypertension [I10] 10/25/2017   • Medication adverse effect, initial encounter [T88.7XXA] 10/25/2017   • Morbid obesity due to excess calories [E66.01] 02/17/2016      Resolved Hospital Problems    Diagnosis Date Noted Date Resolved   No resolved problems to display.       Plan:     - Syncope: Multifactorial with hypotension related to dehydration from diarrhea and likely orthostasis from straining and accidental overdose of BP medications. She has no EKG changes or focal neurologic deficits. Will continue IVF and repeat orthostatics in AM. PT consultation. Monitor on telemetry. Hold Metoprolol and Lisinopril. She is on multiple medications which could contribute as well with benzodiazepine, opiate, lyrica, neurontin, and adderall.  - Hx PE: Completed Pradaxa treatment per patient.  - PPx Lovenox  - Full Code    I discussed the patients findings and my recommendations with patient, family, nursing staff and consulting provider.    Carlos Zhu MD  [facility]  10/25/17  3:34 AM

## 2017-10-25 NOTE — PROGRESS NOTES
Discharge Planning Assessment  [facility]     Patient Name: Radha Retana  MRN: [id number]  Today's Date: 10/24/2017    Admit Date: 10/24/2017          Discharge Needs Assessment     None            Discharge Plan       10/24/17 2149    Case Management/Social Work Plan    Additional Comments Discussed with Dr. Lin that pt's insurance benefits is out of network with [facility], Dr. Lin stated that pt was too unstable for transfer due to hypotension        Discharge Placement     No information found                Demographic Summary     None            Functional Status     None            Psychosocial     None            Abuse/Neglect     None            Legal     None            Substance Abuse     None            Patient Forms     None          Alva Caraballo, RN

## 2017-10-25 NOTE — PLAN OF CARE
Problem: Patient Care Overview (Adult)  Goal: Plan of Care Review  Outcome: Ongoing (interventions implemented as appropriate)    10/25/17 1844   Coping/Psychosocial Response Interventions   Plan Of Care Reviewed With patient;spouse;daughter   Patient Care Overview   Progress improving         Problem: Pain, Acute (Adult)  Goal: Acceptable Pain Control/Comfort Level  Outcome: Ongoing (interventions implemented as appropriate)    10/25/17 1844   Pain, Acute (Adult)   Acceptable Pain Control/Comfort Level making progress toward outcome         Problem: Fall Risk (Adult)  Goal: Absence of Falls  Outcome: Ongoing (interventions implemented as appropriate)    10/25/17 1844   Fall Risk (Adult)   Absence of Falls making progress toward outcome         Problem: Skin Integrity Impairment, Risk/Actual (Adult)  Goal: Skin Integrity/Wound Healing  Outcome: Ongoing (interventions implemented as appropriate)    10/25/17 1844   Skin Integrity Impairment, Risk/Actual (Adult)   Skin Integrity/Wound Healing achieves out       10/25/17 1844   Skin Integrity Impairment, Risk/Actual (Adult)   Skin Integrity/Wound Healing achieves outcome   come

## 2017-10-25 NOTE — PLAN OF CARE
Problem: Patient Care Overview (Adult)  Goal: Plan of Care Review  Outcome: Ongoing (interventions implemented as appropriate)    10/25/17 0304   Coping/Psychosocial Response Interventions   Plan Of Care Reviewed With patient;spouse   Outcome Evaluation   Outcome Summary/Follow up Plan BP improved, VS stable mildly bradycardic, c/o headache Prn Tylenol given as ordered, will continue to monitor   Patient Care Overview   Progress improving       Goal: Adult Individualization and Mutuality  Outcome: Ongoing (interventions implemented as appropriate)    Problem: Pain, Acute (Adult)  Goal: Identify Related Risk Factors and Signs and Symptoms  Outcome: Ongoing (interventions implemented as appropriate)  Goal: Acceptable Pain Control/Comfort Level  Outcome: Ongoing (interventions implemented as appropriate)    Problem: Fall Risk (Adult)  Goal: Identify Related Risk Factors and Signs and Symptoms  Outcome: Ongoing (interventions implemented as appropriate)  Goal: Absence of Falls  Outcome: Ongoing (interventions implemented as appropriate)    Problem: Skin Integrity Impairment, Risk/Actual (Adult)  Goal: Identify Related Risk Factors and Signs and Symptoms  Outcome: Ongoing (interventions implemented as appropriate)  Goal: Skin Integrity/Wound Healing  Outcome: Ongoing (interventions implemented as appropriate)

## 2017-10-25 NOTE — SIGNIFICANT NOTE
10/25/17 1120   Rehab Treatment   Discipline physical therapist   Rehab Evaluation   Evaluation Not Performed other (see comments)  (Spoke with pt and family, all report pt is doing well, feeling better, and has been ambulating in room with no issues. SKilled PT not indicated, Will sign off. Informed RN, Grace)

## 2017-10-25 NOTE — PLAN OF CARE
Problem: Patient Care Overview (Adult)  Goal: Plan of Care Review  Outcome: Ongoing (interventions implemented as appropriate)    10/25/17 1839   Coping/Psychosocial Response Interventions   Plan Of Care Reviewed With patient   Outcome Evaluation   Outcome Summary/Follow up Plan No dizziness or syncopy, BP stable, 120s/80s. Urine toxicology all came up negative despite Klonopin given X 2 today. Lab supervisor is checking equipment. Continue to monitor.   Patient Care Overview   Progress improving         Problem: Pain, Acute (Adult)  Goal: Acceptable Pain Control/Comfort Level  Outcome: Ongoing (interventions implemented as appropriate)    10/25/17 1839   Pain, Acute (Adult)   Acceptable Pain Control/Comfort Level making progress toward outcome         Problem: Fall Risk (Adult)  Goal: Absence of Falls  Outcome: Ongoing (interventions implemented as appropriate)    10/25/17 1839   Fall Risk (Adult)   Absence of Falls making progress toward outcome         Problem: Skin Integrity Impairment, Risk/Actual (Adult)  Goal: Skin Integrity/Wound Healing  Outcome: Ongoing (interventions implemented as appropriate)    10/25/17 1839   Skin Integrity Impairment, Risk/Actual (Adult)   Skin Integrity/Wound Healing making progress toward outcome

## 2017-10-26 VITALS
WEIGHT: 293 LBS | RESPIRATION RATE: 18 BRPM | HEART RATE: 112 BPM | OXYGEN SATURATION: 99 % | TEMPERATURE: 98.6 F | BODY MASS INDEX: 51.91 KG/M2 | HEIGHT: 63 IN | DIASTOLIC BLOOD PRESSURE: 94 MMHG | SYSTOLIC BLOOD PRESSURE: 165 MMHG

## 2017-10-26 PROBLEM — Z79.899 POLYPHARMACY: Status: ACTIVE | Noted: 2017-10-26

## 2017-10-26 PROBLEM — I95.9 HYPOTENSION: Status: ACTIVE | Noted: 2017-10-26

## 2017-10-26 PROBLEM — E86.0 DEHYDRATION: Status: ACTIVE | Noted: 2017-10-26

## 2017-10-26 PROBLEM — N17.9 AKI (ACUTE KIDNEY INJURY) (HCC): Status: ACTIVE | Noted: 2017-10-26

## 2017-10-26 LAB
ANION GAP SERPL CALCULATED.3IONS-SCNC: 11.2 MMOL/L
BUN BLD-MCNC: 4 MG/DL (ref 6–20)
BUN/CREAT SERPL: 5.9 (ref 7–25)
CALCIUM SPEC-SCNC: 7.7 MG/DL (ref 8.6–10.5)
CHLORIDE SERPL-SCNC: 105 MMOL/L (ref 98–107)
CO2 SERPL-SCNC: 23.8 MMOL/L (ref 22–29)
CREAT BLD-MCNC: 0.68 MG/DL (ref 0.57–1)
GFR SERPL CREATININE-BSD FRML MDRD: 92 ML/MIN/1.73
GLUCOSE BLD-MCNC: 93 MG/DL (ref 65–99)
POTASSIUM BLD-SCNC: 3.7 MMOL/L (ref 3.5–5.2)
SODIUM BLD-SCNC: 140 MMOL/L (ref 136–145)

## 2017-10-26 PROCEDURE — 80048 BASIC METABOLIC PNL TOTAL CA: CPT | Performed by: HOSPITALIST

## 2017-10-26 PROCEDURE — G0378 HOSPITAL OBSERVATION PER HR: HCPCS

## 2017-10-26 PROCEDURE — 96361 HYDRATE IV INFUSION ADD-ON: CPT

## 2017-10-26 RX ORDER — CLONAZEPAM 1 MG/1
0.5 TABLET ORAL 2 TIMES DAILY PRN
Status: DISCONTINUED | OUTPATIENT
Start: 2017-10-26 | End: 2017-10-26 | Stop reason: HOSPADM

## 2017-10-26 RX ORDER — CLONAZEPAM 0.5 MG/1
0.5 TABLET ORAL 2 TIMES DAILY PRN
Start: 2017-10-26 | End: 2019-07-02 | Stop reason: DRUGHIGH

## 2017-10-26 RX ORDER — TIZANIDINE 4 MG/1
4 TABLET ORAL EVERY 8 HOURS PRN
Status: DISCONTINUED | OUTPATIENT
Start: 2017-10-26 | End: 2017-10-26 | Stop reason: HOSPADM

## 2017-10-26 RX ADMIN — PREGABALIN 75 MG: 50 CAPSULE ORAL at 03:26

## 2017-10-26 RX ADMIN — METOPROLOL TARTRATE 25 MG: 25 TABLET ORAL at 12:58

## 2017-10-26 RX ADMIN — CYANOCOBALAMIN TAB 500 MCG 500 MCG: 500 TAB at 09:05

## 2017-10-26 RX ADMIN — CLONAZEPAM 0.5 MG: 1 TABLET ORAL at 03:29

## 2017-10-26 RX ADMIN — VILAZODONE HYDROCHLORIDE 40 MG: 40 TABLET ORAL at 09:05

## 2017-10-26 RX ADMIN — TIZANIDINE 4 MG: 4 TABLET ORAL at 10:54

## 2017-10-26 RX ADMIN — SODIUM CHLORIDE 125 ML/HR: 9 INJECTION, SOLUTION INTRAVENOUS at 06:04

## 2017-10-26 NOTE — DISCHARGE SUMMARY
PHYSICIAN DISCHARGE SUMMARY                                                                        [facility]    Patient Identification:  Name: Radha Retana  Age: 47 y.o.  Sex: female  :  1970  MRN: [id number]  Primary Care Physician: Michelle Escalante MD    Admit date: 10/24/2017  Discharge date and time: 10/26/2017     Discharged Condition: good    Discharge Diagnoses:Principal Problem:    Syncope and collapse  Active Problems:    GENEVA (acute kidney injury)    Dehydration    Hypotension    Morbid obesity due to excess calories    Diarrhea    Essential hypertension    Medication adverse effect, initial encounter    Polypharmacy         Hospital Course:  Pleasant 47-year-old female presents after a syncope episode.  She states she had doubled up her dose of metoprolol.  It had a diarrhea stool after which she passed out.  Please H&P for full details.  On presentation she was mildly hypotensive as well as bradycardic on her initial EKG.  In addition she had an elevated creatinine level of 1.45.  Orthostatic hypotension was also present.  The patient was admitted and hydrated with normal saline, and her lisinopril and metoprolol both put on hold.  She responded very nicely to this with blood pressure levels coming back up within normal limits now been a bit on the high side.  BUN/creatinine of also improved and creatinine is now back to baseline at 0.68.  The patient is feeling well again and there is no further diarrhea after presentation.  I will going ahead and give her her first dose of metoprolol today and she can resume her preadmission dose and resume lisinopril tomorrow.  She did present with quite a long list of medications.  She did note that she was not taking to Pradaxa anymore however.  Is also noted that her calcium levels tend to run on the low side.  I have added Os-Maxx D to her regime on discharge and  follow-up on this in a few weeks to month or so would be beneficial.      Consults:     Consults     Date and Time Order Name Status Description    10/24/2017 1940 [facility] (on-call MD unless specified) Completed             Discharge Exam:  Physical Exam   Afebrile vital signs stable.  Well-developed morbidly obese female in no apparent distress.  Lungs clear to auscultation good air movement.  Heart regular rate and rhythm.  Abdomen with normal bowel sounds no tenderness again made regarding masses.  Extremities no clubbing cyanosis edema.  Alert oriented conversant cooperative and pleasant.       Disposition:  Home    Patient Instructions:    Radha Retana   Home Medication Instructions MARISA:[id number]    Printed on:10/26/17 1100   Medication Information                      Albuterol (PROVENTIL IN)  Inhale As Needed.             amphetamine-dextroamphetamine (ADDERALL) 20 MG tablet  Take 20 mg by mouth Daily.             budesonide-formoterol (SYMBICORT) 160-4.5 MCG/ACT inhaler  Inhale 2 puffs 2 (Two) Times a Day.             calcium-vitamin D (OSCAL 500/200 D-3) 500-200 MG-UNIT per tablet  Take 1 tablet by mouth 2 (Two) Times a Day.             clonazePAM (KlonoPIN) 0.5 MG tablet  Take 1 tablet by mouth 2 (Two) Times a Day As Needed (sometimes takes both at night).             cyanocobalamin (VITAMIN B-12) 500 MCG tablet  Take 1 tablet by mouth Daily.             lisdexamfetamine (VYVANSE) 30 MG capsule  Take 30 mg by mouth Daily.             lisinopril (PRINIVIL,ZESTRIL) 10 MG tablet  TAKE 1 TABLET BY MOUTH DAILY             metoprolol tartrate (LOPRESSOR) 25 MG tablet  Take 25 mg by mouth 2 (Two) Times a Day.             pregabalin (LYRICA) 75 MG capsule  Take 75 mg by mouth 2 (Two) Times a Day.             tiZANidine (ZANAFLEX) 4 MG tablet  TAKE 1 TABLET BY MOUTH THREE TIMES DAILY             vilazodone (VIIBRYD) 40 MG tablet tablet  Take 40 mg by mouth Daily.             ziprasidone (GEODON) 60 MG  capsule  Take 60 mg by mouth Daily.               No future appointments.  Additional Instructions for the Follow-ups that You Need to Schedule     Discharge Follow-up with PCP    As directed    Follow Up Details:  1 week             Follow-up Information     Follow up with Michelle Escalante MD .    Specialty:  Internal Medicine    Why:  1 week    Contact information:    [address]  [place] [id number]  [phone]          Follow up with Dilma Landeros MD .    Specialty:  Family Medicine    Why:  1 week    Contact information:    [address]  [address]  [place] [id number]  [phone]          Discharge Order     Start     Ordered    10/26/17 1059  Discharge patient  Once     Expected Discharge Date:  10/26/17    Discharge Disposition:  Home or Self Care        10/26/17 1100            Total time spent discharging patient including evaluation,post hospitalization follow up,  medication and post hospitalization instructions and education total time exceeds 30 minutes.    Signed:  Donell Bhat MD  10/26/2017  11:01 AM    EMR Dragon/Transcription disclaimer:   Much of this encounter note is an electronic transcription/translation of spoken language to printed text. The electronic translation of spoken language may permit erroneous, or at times, nonsensical words or phrases to be inadvertently transcribed; Although I have reviewed the note for such errors, some may still exist.

## 2017-10-26 NOTE — PLAN OF CARE
Problem: Patient Care Overview (Adult)  Goal: Plan of Care Review  Outcome: Outcome(s) achieved Date Met:  10/26/17    10/26/17 0340   Coping/Psychosocial Response Interventions   Plan Of Care Reviewed With patient;spouse   Outcome Evaluation   Outcome Summary/Follow up Plan Only c/o back pain/continue to monitor BP/given po prn Klonipin/on lycia/no falls/falls protocol maintained/remains on IVF's/Per shift report-possible D/C home 10/26/2017   Patient Care Overview   Progress improving         Problem: Pain, Acute (Adult)  Goal: Identify Related Risk Factors and Signs and Symptoms  Outcome: Outcome(s) achieved Date Met:  10/26/17  Goal: Acceptable Pain Control/Comfort Level  Outcome: Ongoing (interventions implemented as appropriate)    Problem: Fall Risk (Adult)  Goal: Identify Related Risk Factors and Signs and Symptoms  Outcome: Outcome(s) achieved Date Met:  10/26/17  Goal: Absence of Falls  Outcome: Ongoing (interventions implemented as appropriate)    Problem: Skin Integrity Impairment, Risk/Actual (Adult)  Goal: Identify Related Risk Factors and Signs and Symptoms  Outcome: Outcome(s) achieved Date Met:  10/26/17  Goal: Skin Integrity/Wound Healing  Outcome: Ongoing (interventions implemented as appropriate)

## 2017-10-26 NOTE — PROGRESS NOTES
Discharge Planning Assessment  [facility]     Patient Name: Radha Retana  MRN: [id number]  Today's Date: 10/26/2017    Admit Date: 10/24/2017          Discharge Needs Assessment       10/26/17 0823    Living Environment    Lives With spouse;child(kameron), adult;parent(s)    Living Arrangements house    Home Accessibility no concerns    Transportation Available family or friend will provide;car    Living Environment    Quality Of Family Relationships supportive    Able to Return to Prior Living Arrangements yes    Discharge Needs Assessment    Equipment Currently Used at Home none    Discharge Planning Comments Spoke with pt, her , and one of her dtrs at bedside.  Facehseet and pharmacy info confirmed.  Pt lives in a house with her , 3 dtrs and her parents.  Pt is [place]ADLs and can drive, but confirms that her dtr provides most of the transportation.  No hx of DME, HH, or SNF.              Discharge Plan       10/26/17 0887    Case Management/Social Work Plan    Plan Pt plans to return home with her family upon DC.  Denies any DC needs at present.               Demographic Summary       10/26/17 0826    Referral Information    Admission Type observation    Arrived From home or self-care    Referral Source admission list    Reason For Consult discharge planning    Record Reviewed medical record            Functional Status       10/26/17 0840    Functional Status Current    Ambulation 0-->independent    Transferring 0-->independent    Toileting 0-->independent    Bathing 0-->independent    Dressing 0-->independent    Eating 0-->independent    Communication 0-->understands/communicates without difficulty    Swallowing (if score 2 or more for any item, consult Rehab Services) 0-->swallows foods/liquids without difficulty    Functional Status Prior    Ambulation 0-->independent    Transferring 0-->independent    Toileting 0-->independent    Bathing 0-->independent    Dressing 0-->independent    Eating  0-->independent    Communication 0-->understands/communicates without difficulty    Swallowing 0-->swallows foods/liquids without difficulty    IADL    Medications independent    Meal Preparation independent;assistive person    Housekeeping independent;assistive person    Laundry independent;assistive person    Shopping independent;assistive person    Oral Care independent    Cognitive/Perceptual/Developmental    Current Mental Status/Cognitive Functioning no deficits noted             Alva Packer RN

## 2017-10-26 NOTE — PLAN OF CARE
Problem: Patient Care Overview (Adult)  Goal: Plan of Care Review    10/26/17 0340   Outcome Evaluation   Outcome Summary/Follow up Plan Only c/o back pain/continue to monitor BP/given po prn Klonipin/on lycia/no falls/falls protocol maintained/remains on IVF's/Per shift report-possible D/C home 10/26/2017         Problem: Pain, Acute (Adult)  Goal: Acceptable Pain Control/Comfort Level  Outcome: Ongoing (interventions implemented as appropriate)    Problem: Fall Risk (Adult)  Goal: Absence of Falls  Outcome: Ongoing (interventions implemented as appropriate)    Problem: Skin Integrity Impairment, Risk/Actual (Adult)  Goal: Skin Integrity/Wound Healing  Outcome: Ongoing (interventions implemented as appropriate)

## 2017-11-01 RX ORDER — GABAPENTIN 600 MG/1
TABLET ORAL
Qty: 270 TABLET | Refills: 0 | OUTPATIENT
Start: 2017-11-01

## 2019-05-16 ENCOUNTER — TRANSCRIBE ORDERS (OUTPATIENT)
Dept: ADMINISTRATIVE | Facility: HOSPITAL | Age: 49
End: 2019-05-16

## 2019-05-16 DIAGNOSIS — M47.817 LUMBOSACRAL SPONDYLOSIS WITHOUT MYELOPATHY: Primary | ICD-10-CM

## 2019-06-04 ENCOUNTER — APPOINTMENT (OUTPATIENT)
Dept: LAB | Facility: HOSPITAL | Age: 49
End: 2019-06-04

## 2019-06-04 ENCOUNTER — APPOINTMENT (OUTPATIENT)
Dept: ONCOLOGY | Facility: CLINIC | Age: 49
End: 2019-06-04

## 2019-07-02 ENCOUNTER — LAB (OUTPATIENT)
Dept: LAB | Facility: HOSPITAL | Age: 49
End: 2019-07-02

## 2019-07-02 ENCOUNTER — OFFICE VISIT (OUTPATIENT)
Dept: ONCOLOGY | Facility: CLINIC | Age: 49
End: 2019-07-02

## 2019-07-02 VITALS
HEART RATE: 85 BPM | BODY MASS INDEX: 50.02 KG/M2 | DIASTOLIC BLOOD PRESSURE: 95 MMHG | TEMPERATURE: 97.9 F | OXYGEN SATURATION: 100 % | WEIGHT: 293 LBS | RESPIRATION RATE: 12 BRPM | SYSTOLIC BLOOD PRESSURE: 175 MMHG | HEIGHT: 64 IN

## 2019-07-02 DIAGNOSIS — D50.0 IRON DEFICIENCY ANEMIA DUE TO CHRONIC BLOOD LOSS: Primary | ICD-10-CM

## 2019-07-02 DIAGNOSIS — I10 ESSENTIAL HYPERTENSION: Primary | ICD-10-CM

## 2019-07-02 LAB
BASOPHILS # BLD AUTO: 0.08 10*3/MM3 (ref 0–0.2)
BASOPHILS NFR BLD AUTO: 0.9 % (ref 0–1.5)
DEPRECATED RDW RBC AUTO: 45.8 FL (ref 37–54)
EOSINOPHIL # BLD AUTO: 0.39 10*3/MM3 (ref 0–0.4)
EOSINOPHIL NFR BLD AUTO: 4.6 % (ref 0.3–6.2)
ERYTHROCYTE [DISTWIDTH] IN BLOOD BY AUTOMATED COUNT: 13.5 % (ref 12.3–15.4)
HCT VFR BLD AUTO: 39.7 % (ref 34–46.6)
HGB BLD-MCNC: 13.4 G/DL (ref 12–15.9)
HGB RETIC QN AUTO: 34.3 PG (ref 29.8–36.1)
IMM GRANULOCYTES # BLD AUTO: 0.02 10*3/MM3 (ref 0–0.05)
IMM GRANULOCYTES NFR BLD AUTO: 0.2 % (ref 0–0.5)
IMM RETICS NFR: 9.9 % (ref 3–15.8)
LYMPHOCYTES # BLD AUTO: 2.61 10*3/MM3 (ref 0.7–3.1)
LYMPHOCYTES NFR BLD AUTO: 31 % (ref 19.6–45.3)
MCH RBC QN AUTO: 31.2 PG (ref 26.6–33)
MCHC RBC AUTO-ENTMCNC: 33.8 G/DL (ref 31.5–35.7)
MCV RBC AUTO: 92.3 FL (ref 79–97)
MONOCYTES # BLD AUTO: 0.6 10*3/MM3 (ref 0.1–0.9)
MONOCYTES NFR BLD AUTO: 7.1 % (ref 5–12)
NEUTROPHILS # BLD AUTO: 4.73 10*3/MM3 (ref 1.7–7)
NEUTROPHILS NFR BLD AUTO: 56.2 % (ref 42.7–76)
NRBC BLD AUTO-RTO: 0 /100 WBC (ref 0–0.2)
PLATELET # BLD AUTO: 375 10*3/MM3 (ref 140–450)
PMV BLD AUTO: 9.5 FL (ref 6–12)
RBC # BLD AUTO: 4.3 10*6/MM3 (ref 3.77–5.28)
RETICS/RBC NFR AUTO: 2.46 % (ref 0.7–1.9)
WBC NRBC COR # BLD: 8.43 10*3/MM3 (ref 3.4–10.8)

## 2019-07-02 PROCEDURE — 99214 OFFICE O/P EST MOD 30 MIN: CPT | Performed by: INTERNAL MEDICINE

## 2019-07-02 PROCEDURE — 85025 COMPLETE CBC W/AUTO DIFF WBC: CPT | Performed by: INTERNAL MEDICINE

## 2019-07-02 PROCEDURE — 85046 RETICYTE/HGB CONCENTRATE: CPT | Performed by: INTERNAL MEDICINE

## 2019-07-02 PROCEDURE — 36416 COLLJ CAPILLARY BLOOD SPEC: CPT | Performed by: INTERNAL MEDICINE

## 2019-07-02 RX ORDER — HYDROXYZINE HYDROCHLORIDE 25 MG/1
25 TABLET, FILM COATED ORAL DAILY PRN
Refills: 2 | COMMUNITY
Start: 2019-06-21

## 2019-07-02 RX ORDER — HALOBETASOL PROPIONATE 0.05 %
OINTMENT (GRAM) TOPICAL
Refills: 3 | COMMUNITY
Start: 2019-06-23

## 2019-07-02 RX ORDER — SUMATRIPTAN 25 MG/1
TABLET, FILM COATED ORAL
Refills: 0 | COMMUNITY
Start: 2019-04-29

## 2019-07-02 RX ORDER — HYDROCODONE BITARTRATE AND ACETAMINOPHEN 5; 325 MG/1; MG/1
1 TABLET ORAL 2 TIMES DAILY
Refills: 0 | COMMUNITY
Start: 2019-06-23

## 2019-07-02 RX ORDER — LISINOPRIL 20 MG/1
20 TABLET ORAL DAILY
Refills: 1 | COMMUNITY
Start: 2019-04-19

## 2019-07-02 RX ORDER — ERGOCALCIFEROL 1.25 MG/1
50000 CAPSULE ORAL
Refills: 0 | COMMUNITY
Start: 2019-04-25

## 2019-07-02 RX ORDER — METOPROLOL TARTRATE 50 MG/1
50 TABLET, FILM COATED ORAL 2 TIMES DAILY
Refills: 1 | COMMUNITY
Start: 2019-06-01

## 2019-07-02 RX ORDER — LORATADINE 10 MG/1
10 TABLET ORAL DAILY
Refills: 1 | COMMUNITY
Start: 2019-06-24

## 2019-07-02 RX ORDER — GABAPENTIN 400 MG/1
400 CAPSULE ORAL
COMMUNITY

## 2019-07-02 NOTE — PROGRESS NOTES
REASON FOR FOLLOW-UP:   1. Pumonary embolism with tachycardia, morbid obesity is most likely the cause, ; anemia after gastric bypass that needs further work up. Provide an opinion on any further workup or treatment   2.  Anemia in the setting of gastric bypass.  Iron deficiency, vitamin B12 deficiency.  Patient was started on Venofer 300 mg on 01/09/2017, will be repeated for 3 times as inpatient.                 History of Present Illness This patient returns today to the office after 2 years absence. She was treated for iron deficiency anemia in the past and deep vein thrombosis associated with pulmonary embolus. The patient was  lost to followup in the office because she decided on her own that she could be treated at home. In any event, she has been referred back because of laboratory assessment has documented minimal raise in the platelet count and obviously the question is why this is happening. The patient has lost 60 pounds of weight in the last 6 months on a diet and we welcome any further weight loss. Her bowel function is appropriate. She has nausea on regular basis and significant reflux, not surprising. Her bowel activity and urination remain normal. No cardiovascular or respiratory issues. She is no longer taking any anticoagulants.                           Medical History             Past Medical History    Diagnosis  Date    •  Anxiety       •  Arthritis       •  Bipolar depression       •  Depression       •  Hyperprolinemia       •  Low back pain       •  Migraine       •  Neuromuscular disorder       •  Obesity       •  Pituitary tumor       •  Pulmonary embolism without acute cor pulmonale  1/7/2017        History of MRSA infection, after abdomen surgery             Surgical History               Past Surgical History    Procedure  Laterality  Date    •  Bariatric surgery, has sugar bypass in 2005 at [facility], with complication, followed by 2 episodes of bowels resection no details  available          •   section          •  Back surgery          •  Cholecystectomy          •  Dilation and curettage, diagnostic / therapeutic                  Medication.  Reviewed and is see EMR records.         ALLERGIES:            Allergies    Allergen  Reactions    •  Adhesive Tape       •  Penicillins       •  Sulfa Antibiotics       •  Morphine  Hives and Rash                   Social History     Social History                  Social History    •  Marital status:            Spouse name:  N/A    •  Number of children:  N/A    •  Years of education:  N/A                      Social History Main Topics      •  Smoking status:  Former Smoker      •  Smokeless tobacco:  Never Used      •  Alcohol use  Yes             Comment: socially      •  Drug use:  No      •  Sexual activity:  Defer                                   Family History    Problem  Relation  Age of Onset    •  Heart disease  Mother       •  Hypertension  Mother       •  Bipolar disorder  Mother       •  Heart disease  Father       •  Diabetes  Father       •  Alcohol abuse  Brother       •  Asthma  Daughter       •  Alcohol abuse  Brother       •  Bipolar disorder  Brother               Review of Systems        General: no fever, no chills, no fatigue,no weight changes, no lack of appetite.  Eyes: no epiphora, xerophthalmia,conjunctivitis, pain, glaucoma, blurred vision, blindness, secretion, photophobia, proptosis, diplopia.  Ears: no otorrhea, tinnitus, otorrhagia, deafness, pain, vertigo.  Nose: no rhinorrhea, no epistaxis, no alteration in perception of odors, no sinuses pressure.  Mouth: no alteration in gums or teeth,  No ulcers, no difficulty with mastication or deglut ion, no odynophagia.  Neck: no masses or pain, no thyroid alterations, no pain in muscles or arteries, no carotid odynia, no crepitation.  Respiratory: no cough, no sputum production,no dyspnea,no trepopnea, no pleuritic pain,no hemoptysis.  Heart: no  "syncope, no irregularity, no palpitations, no angina,no orthopnea,no paroxysmal nocturnal dyspnea.  Vascular Venous: no tenderness,no edema,no palpable cords,no postphlebitic syndrome, no skin changes no ulcerations.  Vascular Arterial: no distal ischemia, noclaudication, no gangrene, no neuropathic ischemic pain, no skin ulcers, no paleness no cyanosis.  GI: no dysphagia, no odynophagia, no regurgitation, no heartburn,no indigestion, nausea,no vomiting,no hematemesis ,no melena,no jaundice,no distention, no obstipation,no enterorrhagia,no proctalgia,no anal  lesions, no changes in bowel habits.  : no frequency, no hesitancy, no hematuria, no discharge,no  pain.  Musculoskeletal: no muscle or tendon pain or inflammation,no  joint pain, no edema, no functional limitation,no fasciculations, no mass.  Neurologic: no headache, no seizures, noalterations on Craneal nerves, no motor deficit, no sensory deficit, normal coordination, no alteration in memory,normal orientation, calculation,normal writting, verbal and written language.  Skin: no rashes,no pruritus no localized lesions.  Psychiatric: no anxiety, no depression,no agitation, no delusions, proper insight.        Objective   Vitals:    07/02/19 1510   BP: 175/95   Pulse: 85   Resp: 12   Temp: 97.9 °F (36.6 °C)   TempSrc: Oral   SpO2: 100%   Weight: (!) 158 kg (348 lb)   Height: 162 cm (63.78\")   PainSc: 8  Comment: pain in back and both knees            Physical Exam       GENERAL:  Well-developed, well-nourished  Patient  in no acute distress. Morbid obese  SKIN:  Warm, dry ,NO rashes,NO purpura ,NO petechiae.  HEENT:  Pupils were equal and reactive to light and accomodation, conjunctivas non injected, no pterigion, normal extraocular movements, normal visual acuity.   Mouth mucosa was moist, no exudates in oropharynx, normal gum line, normal roof of the mouth and pillars, normal papillations of the tongue.  NECK:  Supple with good range of motion; no " thyromegaly or masses, no JVD or bruits, no cervical adenopathies.No carotid arteries pain, no carotid abnormal pulsation , NO arterial dance.  LYMPHATICS:  No cervical, NO supraclavicular, NO axillary,NO epitrochlear , NO inguinal adenopathy.  CHEST:  Normal excursion of both herve thoraces, normal voice fremitus, no subcutaneous emphysema, normal axillas, no rashes or acanthosis nigricans. Lungs clear to percussion and auscultation, normal breath sounds bilaterally, no wheezing,NO crackles NO ronchi, NO stridor, NO rubs.  CARDIAC AND VASCULAR:  normal rate and regular rhythm, without murmurs,NO rubs NO S3 NO S4 right or left . Normal femoral, popliteal, pedis, brachial and carotid pulses.  ABDOMEN:  Soft, nontender with no organomegaly or masses, no ascites, no collateral circulation,no distention,no Turner sign, no abdominal pain, no inguinal hernias,no umbilical hernia, no abdominal bruits. Normal bowel sounds.  GENITAL: Not  Performed.  EXTREMITIES  AND SPINE:  No clubbing, cyanosis or edema, no deformities or pain .No kyphosis, scoliosis, deformities or pain in spine, ribs or pelvic bone.  NEUROLOGICAL:  Patient was awake, alert, oriented to time, person and place.                RECENT LABS:     Results from last 7 days   Lab Units 07/02/19  1447   WBC 10*3/mm3 8.43   HEMOGLOBIN g/dL 13.4   HEMATOCRIT % 39.7   PLATELETS 10*3/mm3 375              Invalid input(s): LABALBU, PROT      Assessment/Plan         This patient has been referred back because of thrombocytosis. Today the patient has a normal platelet count, normal hemoglobin, normal MCV, normal white count and normal white count differential. The only thing in the physical exam that is abnormal is her excessive weight. The weight is excessive even though she has lost 60 pounds of weight in the last 6 months.     She has not had anything to suggest recurrent iron deficiency and she has stopped her Pradaxa as anticoagulant more than a year ago and this was  stopped by her primary care physician. She has not had any recurrences of blood clots. To me the blood clot was associated with her morbid obesity.     RECOMMENDATIONS:  Given the normal platelet count today, we will wait for a ferritin and iron profile and a CMP and the patient will be informed about this if this is abnormal. Otherwise, I do not think I need to follow her up. I am going to measure a C-reactive protein and share this with her in regard to marker of inflammation and cardiovascular risk. Encouraged her to continue losing weight in any way or form possible. Her weight is very dramatically excessive.     Otherwise, no return appointment was made.

## 2019-07-03 ENCOUNTER — TELEPHONE (OUTPATIENT)
Dept: ONCOLOGY | Facility: CLINIC | Age: 49
End: 2019-07-03

## 2019-07-09 ENCOUNTER — TELEPHONE (OUTPATIENT)
Dept: ONCOLOGY | Facility: CLINIC | Age: 49
End: 2019-07-09

## 2020-05-21 NOTE — PROGRESS NOTES
BLE Venous doppler study complete. Preliminary RLE is neagtive and LLE is positve for subacute calf DVT, no progression  from previous scan of 1/8 and 1/10/17 called to Jero AVILEZ  
English

## 2020-07-01 ENCOUNTER — PROCEDURE VISIT (OUTPATIENT)
Dept: OBSTETRICS AND GYNECOLOGY | Age: 50
End: 2020-07-01

## 2020-07-01 ENCOUNTER — OFFICE VISIT (OUTPATIENT)
Dept: OBSTETRICS AND GYNECOLOGY | Age: 50
End: 2020-07-01

## 2020-07-01 VITALS
DIASTOLIC BLOOD PRESSURE: 70 MMHG | SYSTOLIC BLOOD PRESSURE: 118 MMHG | WEIGHT: 293 LBS | BODY MASS INDEX: 50.02 KG/M2 | HEIGHT: 64 IN

## 2020-07-01 DIAGNOSIS — N93.9 ABNORMAL UTERINE BLEEDING (AUB): ICD-10-CM

## 2020-07-01 DIAGNOSIS — R79.89 ELEVATED PROLACTIN LEVEL: Primary | ICD-10-CM

## 2020-07-01 DIAGNOSIS — Z12.4 SCREENING FOR MALIGNANT NEOPLASM OF CERVIX: ICD-10-CM

## 2020-07-01 DIAGNOSIS — N93.9 ABNORMAL UTERINE BLEEDING (AUB): Primary | ICD-10-CM

## 2020-07-01 PROCEDURE — 99213 OFFICE O/P EST LOW 20 MIN: CPT | Performed by: OBSTETRICS & GYNECOLOGY

## 2020-07-01 PROCEDURE — 99386 PREV VISIT NEW AGE 40-64: CPT | Performed by: OBSTETRICS & GYNECOLOGY

## 2020-07-01 PROCEDURE — 76830 TRANSVAGINAL US NON-OB: CPT | Performed by: OBSTETRICS & GYNECOLOGY

## 2020-07-01 NOTE — PROGRESS NOTES
Routine Annual Visit    2020    Patient: Radha Retana          MR#:[id number]      Chief Complaint   Patient presents with   • Gynecologic Exam     New pt. last pap 4 yrs ago per pt. MG 2019 at [facility]. colonoscopy . pt referred here by PCP for irregular bleeding. pt states she didnt have a menstrual cycle for 6 months and had a cycle in january. no bleeding since then. pt also c/o decreased libido         History of Present Illness    50 y.o. female  who presents for annual exam.   She notes that she hadn't had a period for six months but then she had about 5 days of mild to moderate bleeding in January.  It was not particularly heavy or prolonged.  She feels as though it was like her normal menses.  She has not had any further bleeding or cramping    She has back pain and knee pain, difficulty walking and her daughter cares for her  The last year she has lost 100 lbs, and she is very pleased with this.    She has a history of hyperprolactinemia and was on cabergoline.  Sounds as though she was unsure about this diagnosis and so sought care at the [facility].    Health Maintenance  Last pap: 6-7 years ago, history abnormal: has not had colpo or LEEP  Mammogram: yes yearly, history abnormal: fibrocystic disease.   [facility]  Colonoscopy coming up in September  Family history of Breast, ovarian, uterine, colon, pancreatic cancer: no  PCP Tejal MAS, [facility]    Current contraception PM    Patient's last menstrual period was 2020 (within days).  Obstetric History:  OB History        1    Para   1    Term   1            AB        Living   1       SAB        TAB        Ectopic        Molar        Multiple        Live Births   1          Obstetric Comments   Adopted two other children            Menstrual History:     Patient's last menstrual period was 2020 (within days).       ________________________________________  Patient Active Problem List   Diagnosis   •  Anxiety   • Depression   • Knee pain   • Low back pain   • Psoriasis   • Sciatica   • Morbid obesity due to excess calories (CMS/HCC)   • Disorder of lactation   • Pituitary adenoma (CMS/HCC)   • Chronic pain of both knees   • Pulmonary embolism without acute cor pulmonale (CMS/HCC)   • B12 deficiency   • Iron deficiency anemia   • Syncope and collapse   • Diarrhea   • Essential hypertension   • Medication adverse effect, initial encounter   • GENEVA (acute kidney injury) (CMS/HCC)   • Dehydration   • Hypotension   • Polypharmacy       Past Medical History:   Diagnosis Date   • Anxiety    • Arthritis    • Asthma    • Bipolar depression (CMS/HCC)    • Deep vein thrombosis (CMS/HCC)    • Depression    • Essential hypertension 10/25/2017   • History of infectious mononucleosis    • Hyperprolactinemia (CMS/HCC)    • Hyperprolinemia (CMS/HCC)    • Iron deficiency anemia 2017   • Low back pain    • Migraine    • Neuromuscular disorder (CMS/HCC)    • Obesity    • Pituitary adenoma (CMS/HCC)    • Pituitary tumor    • Pulmonary embolism without acute cor pulmonale (CMS/HCC) 2017   • Thyroid disease        Family History   Problem Relation Age of Onset   • Heart disease Mother    • Hypertension Mother    • Bipolar disorder Mother    • Heart disease Father    • Diabetes Father    • Alcohol abuse Brother    • Asthma Daughter    • Alcohol abuse Brother    • Bipolar disorder Brother    • Breast cancer Neg Hx    • Ovarian cancer Neg Hx    • Uterine cancer Neg Hx    • Colon cancer Neg Hx        Past Surgical History:   Procedure Laterality Date   • BACK SURGERY     • BARIATRIC SURGERY     •  SECTION     • CHOLECYSTECTOMY     • DILATION AND CURETTAGE, DIAGNOSTIC / THERAPEUTIC      for pregnancy loss       Social History     Tobacco Use   Smoking Status Former Smoker   Smokeless Tobacco Never Used       has a current medication list which includes the following prescription(s): albuterol, gabapentin, halobetasol,  "hydrocodone-acetaminophen, lisdexamfetamine, lisinopril, metoprolol tartrate, morphine sulfate er, tizanidine, ziprasidone, amphetamine-dextroamphetamine, hydroxyzine, loratadine, sumatriptan, vitamin d, and vortioxetine hbr.  ________________________________________      The following portions of the patient's history were reviewed and updated as appropriate: allergies, current medications, past family history, past medical history, past social history, past surgical history and problem list.    Review of Systems   Constitutional: Negative for fever and unexpected weight change.   Respiratory: Negative for shortness of breath.    Cardiovascular: Negative for chest pain.   Gastrointestinal: Negative for abdominal pain, constipation and diarrhea.   Genitourinary: Negative for frequency and urgency.   Musculoskeletal: Positive for arthralgias and myalgias.   Hematological: Negative for adenopathy.   Psychiatric/Behavioral: Negative for dysphoric mood.       Objective   Physical Exam    /70   Ht 162 cm (63.78\")   Wt (!) 142 kg (313 lb)   LMP 01/19/2020 (Within Days)   Breastfeeding No   BMI 54.10 kg/m²    BP Readings from Last 3 Encounters:   07/01/20 118/70   07/02/19 175/95   10/26/17 165/94      Wt Readings from Last 3 Encounters:   07/01/20 (!) 142 kg (313 lb)   07/02/19 (!) 158 kg (348 lb)   10/26/17 (!) 154 kg (340 lb)         BMI: Body mass index is 54.1 kg/m².       General:   alert, appears stated age and cooperative   Neck: No thyromegaly or LAD, no carotid bruit noted   Heart:: regular rate and rhythm, S1, S2 normal, no murmur, click, rub or gallop   Lungs: CTAB, no wheezing or crackles   Abdomen: soft, non-tender, without masses or organomegaly and protuberant   Breast: inspection negative, no nipple discharge or bleeding, no masses or nodularity palpable   Urethra and bladder: urethral meatus normal; bladder nontender to palpation;   Vulva: normal, Bartholin's, Urethra, Fairgrove's normal   Vagina: " normal mucosa, normal discharge, no bleeding noted   Cervix: extremely difficult to visualize, is anterior and deep in the pelvic, sharply retroverted   Uterus: normal size or exam is limited habitus    Adnexa: exam limited by habitus     A great attempt was made to complete endometrial biopsy in the office today.  Due to the sharp retroversion of there uterus, patient habitus it was unable to be completed    TVUS with 4.81 mm endometrial stripe, homogenous    Assessment:    Possible postmenopausal bleeding vs perimenopausal bleeding  Morbid obesity  Need for cervical cancer screening    Plan:    Plan     [x]  Mammogram request made  [x]  PAP done  []  Labs:   []  GC/Chl/TV  []  DEXA scan   []  Referral for colonoscopy:     Ordered FSH and estradiol today to determine if she is truly menopausal now, however the bleeding in January may have just still been perimenopausal bleeding as had only been six months since last cycle. Her endometrial stripe is not significantly thickened but EMB was performed today as she has risk factors for endometrial hyperplasia or cancer.  Was unable to be performed due to her habitus, sharp retroversion of her uterus.  I discussed with her the option of repeating ultrasound in a few months and monitor for further bleeding versus proceeding to the OR for D&C and she prefers to monitor for now and repeat an ultrasound.    Counseling  [x]  Nutrition - We discussed diet today and I congratulated her on her weight loss, encouraged her to continue the trend  [x]  Physical activity/regular exercise   [x]  Healthy weight  []  Injury prevention  []  Smoking cessation  []  Substance misuse/abuse  [x]  Sexual behavior  []  STD prevention  []  Contraception  []  Dental health  [x]  Mental health  []  Immunization  [x]  Encouraged SBE      Patient's BMI is Body mass index is 54.1 kg/m²., which is classified as obese. We discussed health consequences of obesity and recommended weight loss. I counseled  regarding diet modifications and exercise in order to reach weight loss goal.     Jeri Galarza MD  07/01/2020  10:39

## 2020-07-02 PROBLEM — N93.9 ABNORMAL UTERINE BLEEDING (AUB): Status: ACTIVE | Noted: 2020-07-02

## 2020-07-02 LAB
ESTRADIOL SERPL-MCNC: <5 PG/ML
FSH SERPL-ACNC: 80.8 MIU/ML
PROLACTIN SERPL-MCNC: 26.8 NG/ML (ref 4.8–23.3)

## 2020-07-02 NOTE — PROGRESS NOTES
Please notify that her labs indicate she is truly in menopause now. If she has any further bleeding always needs to come in right away.  Her prolactin level was only slightly elevated at 26, would recommend repeating in a few weeks and will place an order, she can come to the office to have recollected. If she is seeing new endocrinologist soon, can just be repeated with them    Jeri Galarza MD  7/2/2020  09:36

## 2020-07-08 LAB
CYTOLOGIST CVX/VAG CYTO: NORMAL
CYTOLOGY CVX/VAG DOC CYTO: NORMAL
CYTOLOGY CVX/VAG DOC THIN PREP: NORMAL
DX ICD CODE: NORMAL
HIV 1 & 2 AB SER-IMP: NORMAL
HPV I/H RISK 4 DNA CVX QL PROBE+SIG AMP: NEGATIVE
OTHER STN SPEC: NORMAL
STAT OF ADQ CVX/VAG CYTO-IMP: NORMAL

## 2020-07-09 ENCOUNTER — TELEPHONE (OUTPATIENT)
Dept: OBSTETRICS AND GYNECOLOGY | Age: 50
End: 2020-07-09

## 2020-07-09 NOTE — TELEPHONE ENCOUNTER
----- Message from Jeri Galarza MD sent at 7/2/2020  9:37 AM EDT -----  Please notify that her labs indicate she is truly in menopause now. If she has any further bleeding always needs to come in right away.  Her prolactin level was only slightly elevated at 26, would recommend repeating in a few weeks and will place an order, she can come to the office to have recollected. If she is seeing new endocrinologist soon, can just be repeated with them    Jeri Galarza MD  7/2/2020  09:36

## 2020-07-09 NOTE — TELEPHONE ENCOUNTER
----- Message from Jeri Galarza MD sent at 7/8/2020 12:41 PM EDT -----  Please notify that her Pap was normal and HPV negative    Jeri Galarza MD  7/8/2020  12:41

## 2023-09-27 NOTE — CONSULTS
Subjective     REASON FOR CONSULTATION:  Pumonary embolism with tachycardia, morbid obesity is most likely the cause, ; anemia after gastric bypass that needs further work up.  Provide an opinion on any further workup or treatment                             REQUESTING PHYSICIAN:  DR SOLOMON  RECORDS OBTAINED:  Records of the patients history including those obtained from the referring provider were reviewed and summarized in detail.    HISTORY OF PRESENT ILLNESS:  The patient is a 46 y.o. year old female who is here for an opinion about the above issue.    History of Present Illness  This 46-year-old white female was brought yesterday to the emergency room by the family, complaining of several days history of shortness of breath, coughing, and a sensation of palpitations.  The patient just got over a very severe upper and lower respiratory infection; I wonder if rhinovirus or respiratory syncytial virus.  In any event, at the time of the admission the patient again was tachycardic with no fever.  A D-dimer was done that showed elevation and this triggered a CT scan of the chest that documented pulmonary emboli.  The patient was anticoagulated with Lovenox and she was transferred to the floor and she is ready to go for Doppler studies of the lower extremities as we speak.  The patient denies any swelling in her lower extremities or trauma, erythema, or palpable cords.  She has not had any fevers or chills.  Her sputum at this time is minimal and her shortness of breath is prominent.  She has not had any obvious pleuritic pain or hemoptysis.      The patients weight remains a problem, very elevated, with a BMI of 65.  The patient has not had any modification in her weight lately.  She denies any heartburn or indigestion; no difficulty swallowing; no abdominal pain or cramping.  Bowel activity has been normal with no passage of blood in the stools.  Urination is normal.  No vaginal bleeding or discharge.  She has   [facility] Follow Up    ASSESSMENT/PLAN                                                    1. Opioid use disorder, severe, dependence (H)  Pt wants to remain at stable dose of buprenorphine 2mg/day vs continuing to attempt to taper at this time.  He did not want to increase dose.  Still not interested in XR buprenorphine.    - Drugs of Abuse Screen Urine (POC CUPS) POCT  - buprenorphine HCl-naloxone HCl (SUBOXONE) 2-0.5 MG per film; Place 1 Film under the tongue daily  Dispense: 30 Film; Refill: 0    2. Therapeutic opioid-induced constipation (OIC)  Recommend he take Miralax every day and use senna prn  - SENNA-docusate sodium (SENNA S) 8.6-50 MG tablet; Take 1 tablet by mouth daily as needed (constipation)  Dispense: 30 tablet; Refill: 1    Return in 4 weeks (on 10/25/2023) for Follow up, in person.    Patient counseling completed today:  Discussed mechanism of action, potential risks/benefits/side effects of medications and other recommendations above.    Reviewed directions for initiation of buprenorphine to reduce risk of precipitated withdrawal and maximize efficacy.    Harm reduction counseling including availability of naloxone  Discussed importance of avoiding isolation, building a network of supportive relationships, avoiding people/places/things associated with past use to reduce risk of relapse  SUBJECTIVE                                                          CC/HPI:  Reji Goldstein is a 21 year old male with PMH nicotine use disorder and opioid use disorder on buprenorphine taper per his request who presents to the [facility] for return visit.       Brief History:  Reji Goldstein was first seen in Recovery Clinic on 05/15/23. They were referred by staff from [facility] ED after pt was seen there in 3/2023 requesting rx for buprenorphine.   Patient's reasons for seeking treatment on this date include wanting to continue taper off buprenorphine.     Pt started buprenorphine for  continuous pain in her back and in her knees. She has been seen by pain specialists and local injections have been given.  The patient also has treatment for an adenoma of the hypophysis and she goes to the [facility] every 3 months and has an MRI of the hypophysis twice a year and according to her everything is stable.              Past Medical History   Diagnosis Date   • Anxiety    • Arthritis    • Bipolar depression    • Depression    • Hyperprolinemia    • Low back pain    • Migraine    • Neuromuscular disorder    • Obesity    • Pituitary tumor    • Pulmonary embolism without acute cor pulmonale 2017        Past Surgical History   Procedure Laterality Date   • Bariatric surgery     •  section     • Back surgery     • Cholecystectomy     • Dilation and curettage, diagnostic / therapeutic          No current facility-administered medications on file prior to encounter.      Current Outpatient Prescriptions on File Prior to Encounter   Medication Sig Dispense Refill   • amphetamine-dextroamphetamine (ADDERALL) 20 MG tablet   0   • cabergoline (DOSTINEX) 0.5 MG tablet Take 0.25 mg by mouth 2 (two) times a week.     • clonazePAM (KlonoPIN) 2 MG tablet Take 2 mg by mouth 2 (Two) Times a Day As Needed.     • gabapentin (NEURONTIN) 600 MG tablet TAKE 1 TABLET BY MOUTH THREE TIMES DAILY. 270 tablet 5   • ibuprofen (ADVIL,MOTRIN) 200 MG tablet Take 200 mg by mouth Every 6 (Six) Hours As Needed for mild pain (1-3).     • lisdexamfetamine (VYVANSE) 70 MG capsule Take 70 mg by mouth every morning.     • naproxen sodium (ALEVE) 220 MG tablet Take 220 mg by mouth 2 (Two) Times a Day As Needed for mild pain (1-3).     • oxyCODONE-acetaminophen (PERCOCET) 5-325 MG per tablet Take 1 tablet by mouth 3 (Three) Times a Day As Needed (pain). 90 tablet 0   • tiZANidine (ZANAFLEX) 4 MG tablet TAKE 1 TABLET BY MOUTH THREE TIMES DAILY 270 tablet 0   • VICTOZA 18 MG/3ML solution pen-injector   6   • vilazodone (VIIBRYD)  treatment of OUD through [facility] in 12/2021.  Max dose 16mg/day.  He began tapering dose on his own in Fall, 2022.  He has continued to decrease dose since that time.  He denies return to use of fentanyl since 12/2021.       Substance Use History :  Opioids:   Age at first use: 17  Current use: substance: Perc 30's ; quantity stated never counted but 10 pills would be the max he would use daily ; route: inhalation  ; timing of last use: 12/2021;                 IV drug use: No   History of overdose: Yes: one episode in a vehicle with friends 7/2020 x2  Previous residential or outpatient treatments for addiction : Yes: [facility] in 2021 in patient   Previous medication treatments for addiction: Yes: Currently is taking Suboxone   Longest period of sobriety: 12/2021 to present  Medical complications related to substance use: denies   Hepatitis C: no record of testing  HIV: 8/14/2019 HIV ag/ab nonreactive     Other Addiction History:  Stimulants   Denies   Sedatives/hypnotics/anxiolytics:   Denies   Alcohol:   Denies   Nicotine:   H/o vaping  Cannabis:   Denies   Hallucinogens/Dissociatives:   Denies   Eating disorder:  Denies   Gambling:              Denies         PAST PSYCHIATRIC HISTORY:  Diagnoses- denies   Suicide Attempts: No   Hospitalizations: No         8/28/2023     2:00 PM 9/11/2023     2:00 PM 9/27/2023     2:00 PM   PHQ   PHQ-9 Total Score 0 0 0   Q9: Thoughts of better off dead/self-harm past 2 weeks Not at all Not at all Not at all       Social History  Housing status: with family father and 8 siblings  Employment status: working at [employer] part-time  Relationship status: Single  Children: no children  Legal: denies       Most recent [facility] visit: 9/11/23    A/P last visit:  1. Opioid use disorder, severe, dependence (H)  Stable.  Continue working on self taper.  Currently taking 2/3-3/4 film daily and will continue with this.  Reviewed Sublocade as alternative for final taper, feels comfortable  40 MG tablet tablet Take by mouth daily.     • ziprasidone (GEODON) 60 MG capsule 60 mg 2 (Two) Times a Day.          ALLERGIES:    Allergies   Allergen Reactions   • Adhesive Tape    • Penicillins    • Sulfa Antibiotics    • Morphine Hives and Rash        Social History     Social History   • Marital status:      Spouse name: N/A   • Number of children: N/A   • Years of education: N/A     Social History Main Topics   • Smoking status: Former Smoker   • Smokeless tobacco: Never Used   • Alcohol use Yes      Comment: socially   • Drug use: No   • Sexual activity: Defer     Other Topics Concern   • None     Social History Narrative        Family History   Problem Relation Age of Onset   • Heart disease Mother    • Hypertension Mother    • Bipolar disorder Mother    • Heart disease Father    • Diabetes Father    • Alcohol abuse Brother    • Asthma Daughter    • Alcohol abuse Brother    • Bipolar disorder Brother         Review of Systems   General: no fever, chills,SOME fatigue, NO weight changes, or lack of appetite.  Eyes: no epiphora, xerophthalmia,conjunctivitis, pain, glaucoma, blurred vision, blindness, secretion, photophobia, proptosis, diplopia.  Ears: no otorrhea, tinnitus, otorrhagia, deafness, pain, vertigo.  Nose: no rhinorrhea, epistaxis, alteration in perception of odors, sinuses pressure.  Mouth: no alteration in gums or teeth,  ulcers, no difficulty with mastication or deglut ion, no odynophagia.  Neck: no masses or pain, no thyroid alterations, no pain in muscles or arteries, no carotid odynia, no crepitation.  Respiratory: DRY cough,CLEAR sputum production,SIGNIFICANT dyspnea,NO trepopnea, pleuritic pain, hemoptysis.  Heart: no syncope, irregularity, SIGNIFICANT palpitations,NO angina, orthopnea, paroxysmal nocturnal dyspnea.  Vascular Venous: no tenderness,edema, palpable cords, postphlebitic syndrome, skin changes or ulcerations.  Vascular Arterial: no distal ischemia, claudication, gangrene,  with his current progress.   - Drugs of Abuse Screen Urine (POC CUPS) POCT  - buprenorphine HCl-naloxone HCl (SUBOXONE) 2-0.5 MG per film; Place 1 Film under the tongue daily  Dispense: 15 Film; Refill: 0     2. Nicotine use disorder  Encouraged continued cessation efforts.  Reviewed any decrease in use provides him health benefits.  He is also motivated by financial benefit of not using nicotine.                 Return in about 2 weeks (around 9/25/2023).    09/27/23 visit:  Pt states he has continued to take 2mg buprenorphine SL daily.  Denies withdrawal symptoms when he continues this dose.  No c/o cravings.  Requests refill of senna, has been taking daily for OIC.   Would like to continue 2mg SL buprenorphine/day at this time.    Work has been busier again.  His shift ends at 2am, and he states his sleep has been suffering as a result of his work schedule.  C/o feeling tired as a result.  Otherwise he states his mood has been good.      Labs discussed with patient?  Yes      [place] Prescription Drug Monitoring Program Reviewed:  Yes; as expected  09/11/2023 09/11/2023 2 Suboxone 2 Mg-0.5 Mg Sl Film 15.00 15 Ka Par [id number] Romel ([id number]) 0/0 2.00 mg Medicaid [place]     Medications:  buprenorphine HCl-naloxone HCl (SUBOXONE) 8-2 MG per film, Take 1/4 strip daily.  nicotine polacrilex (NICORETTE) 4 MG gum, Place 1 each (4 mg) inside cheek every hour as needed for smoking cessation  SENNA-docusate sodium (SENNA S) 8.6-50 MG tablet, Take 1 tablet by mouth daily as needed (constipation)  naloxone (NARCAN) 4 MG/0.1ML nasal spray, Spray 1 spray (4 mg) into one nostril alternating nostrils as needed for opioid reversal every 2-3 minutes until assistance arrives (Patient not taking: Reported on 6/14/2023)    [COMPLETED] buprenorphine-naloxone (SUBOXONE) 2-0.5 MG sublingual tablet 1 tablet        No Known Allergies    PMH, PSH, FamHx reviewed      OBJECTIVE                                                      /70 (BP  Location: Left arm, Patient Position: Sitting)   Pulse 84   Temp 98.2  F (36.8  C) (Oral)   SpO2 97%     Physical Exam  Vitals and nursing note reviewed.   Constitutional:       Appearance: Normal appearance.   HENT:      Head: Normocephalic and atraumatic.   Eyes:      General: No scleral icterus.  Pulmonary:      Effort: Pulmonary effort is normal.   Skin:     Coloration: Skin is not jaundiced or pale.   Neurological:      General: No focal deficit present.      Mental Status: He is alert and oriented to person, place, and time.      Coordination: Coordination is intact.      Gait: Gait is intact.   Psychiatric:         Attention and Perception: Attention and perception normal.         Mood and Affect: Mood and affect normal.         Speech: Speech normal.         Behavior: Behavior normal. Behavior is cooperative.         Thought Content: Thought content normal.         Judgment: Judgment normal.         Labs:    UDS:    Lab Results   Component Value Date    BUP Screen Positive (A) 09/27/2023    BZO Negative 09/27/2023    BAR Negative 09/27/2023    RAMON Negative 09/27/2023    MAMP Negative 09/27/2023    AMP Negative 09/27/2023    MDMA Negative 09/27/2023    MTD Negative 09/27/2023    ISN220 Negative 09/27/2023    OXY Negative 09/27/2023    PCP Negative 09/27/2023    THC Negative 09/27/2023    TEMP 92 F 09/27/2023    SGPOCT 1.025 09/27/2023     *POC urine drug screen does not screen for Fentanyl      Recent Results (from the past 240 hour(s))   Drugs of Abuse Screen Urine (POC CUPS) POCT    Collection Time: 09/27/23  2:34 PM   Result Value Ref Range    POCT Kit Lot Number [id number]     POCT Kit Expiration Date 11/20/24     Temperature Urine POCT 92 F 90 F, 92 F, 94 F, 96 F, 98 F, 100 F    Specific [place] POCT 1.025 1.005, 1.015, 1.025    pH Qual Urine POCT 5 pH 4 pH, 5 pH, 7 pH, 9 pH    Creatinine Qual Urine POCT 100 mg/dL 20 mg/dL, 50 mg/dL, 100 mg/dL, 200 mg/dL    Internal QC Qual Urine POCT Valid Valid     neuropathic ischemic pain, skin ulcers, paleness or cyanosis.  GI: no dysphagia, odynophagia, no regurgitation, no heartburn,no indigestion,no nausea,no vomiting,no hematemesis ,no melena,no jaundice,no distention, no obstipation,no enterorrhagia,no proctalgia,no anal  lesions, nochanges in bowel habits.  : no frequency, hesitancy, hematuria, discharge, pain.  Musculoskeletal: CHRONIC muscle AND tendon pain or inflammation, joint pain, edema, SOMEfunctional limitation, NO fasciculations, mass.  Neurologic: CHRONIC headache, NO seizures, alterations on Craneal nerves, no motor or senssory deficit, normal coordination, no alteration in memory, orientation, calculation,writting, verbal or written language.  Skin: no rashes, pruritus or localized lesions.  Psychiatric: SIGNIFICANT anxiety, NO depression, agitation, delusions, proper insight.    Objective     Vitals:    01/07/17 2338 01/08/17 0111 01/08/17 0525 01/08/17 0914   BP: (!) 196/123 (!) 147/102 100/62 135/80   BP Location:  Right arm Left arm Right arm   Patient Position:  Lying Lying Lying   Pulse: 91 89 104    Resp:  16 16    Temp:  97.9 °F (36.6 °C) 97.8 °F (36.6 °C)    TempSrc:  Oral Oral    SpO2:  95% 92%    Weight:       Height:         No flowsheet data found.    Physical Exam    GENERAL:  Well-developed, well-nourished  Patient  in no acute distress. OBESE MILD RESPIRATORY DISTRESS  SKIN:  Warm, dry without rashes, purpura or petechiae.  HEENT:  Pupils were equal and reactive to light and accomodation, conjunctivas non injected, no pterigion, normal extraocular movements, normal visual acuity.   Mouth mucosa was moist, no exudates in oropharynx, normal gum line, normal roof of the mouth and pillars, normal papillations of the tongue.Ear canals were normal, as well tympanic membranes, normal hearing acuity.No pain in mastoid area or erythema.  NECK:  Supple with good range of motion; no thyromegaly or masses, no JVD or bruits, no cervical  Amphetamine Qual Urine POCT Negative Negative    Barbiturate Qual Urine POCT Negative Negative    Buprenorphine Qual Urine POCT Screen Positive (A) Negative    Benzodiazepine Qual Urine POCT Negative Negative    Cocaine Qual Urine POCT Negative Negative    Methamphetamine Qual Urine POCT Negative Negative    MDMA Qual Urine POCT Negative Negative    Methadone Qual Urine POCT Negative Negative    Opiate Qual Urine POCT Negative Negative    Oxycodone Qual Urine POCT Negative Negative    Phencyclidine Qual Urine POCT Negative Negative    THC Qual Urine POCT Negative Negative     At least 30min spent in review of medical record,  review, obtaining histories, discussing recommendations, counseling, coordination of care.     Erica Padron MD  Addiction Medicine  [facility]  [address] [place] [address] [address] [place] [id number] [phone]       adenopathies.No carotid arteries pain, no carotid abnormal pulsation or arterial dance.  LYMPHATICS:  No cervical, supraclavicular, axillary, epitrochlear or inguinal adenopathy.  CHEST:  Normal excursion of both herve thoraces, normal voice fremitus, no subcutaneous emphysema, normal axillas, no rashes or acanthosis nigricans. Lungs clear to percussion and auscultation, normal breath sounds bilaterally, no wheezing, crackles or ronchi, no stridor, no rubs.  CARDIAC AND VASCULAR: PMI not displaced,no thrills, FAST rate and regular rhythm, without murmurs, rubs , TENDENCY TO RIGHT [id number] , NO S4 gallops. Normal femoral, popliteal, pedis, brachial and carotid pulses.NO PAIN OR EDEMA IN LE OR PALPABLE CORDS  ABDOMEN:  Soft, nontender with no organomegaly or masses, no ascites, no collateral circulation,no distention,no Bernabe sign, no abdominal pain, no inguinal hernias,no umbilical hernias, no abdominal bruits. Normal bowel sounds.  GENITAL: Not  Performed.  EXTREMITIES  AND SPINE:  No clubbing, cyanosis or edema, no deformities or pain .No kyphosis, scoliosis, deformities or pain in spine, ribs or pelvic bone.  NEUROLOGICAL:  Patient was awake, alert, oriented to time, person and place    RECENT LABS:  Hematology WBC   Date Value Ref Range Status   01/08/2017 7.86 4.50 - 10.70 10*3/mm3 Final     RBC   Date Value Ref Range Status   01/08/2017 3.86 (L) 3.90 - 5.20 10*6/mm3 Final     HEMOGLOBIN   Date Value Ref Range Status   01/08/2017 11.0 (L) 11.9 - 15.5 g/dL Final     HEMATOCRIT   Date Value Ref Range Status   01/08/2017 35.4 (L) 35.6 - 45.5 % Final     PLATELETS   Date Value Ref Range Status   01/08/2017 299 140 - 500 10*3/mm3 Final          Assessment/Plan   In summary, this patient has developed pulmonary embolism.  I do believe that the reason why she has developed this is the recent respiratory infection.  I will not be surprised if the respiratory infection has triggered a lupus anticoagulant to trigger PE.   The other factor on her though is her morbid obesity.  As we have stated many times before, obesity per se triggers inflammation and propensity for thrombophilia.      There is no family history of thrombophilia in her parents.  Therefore, the need for any other testing in this regard will be limited and I am going to proceed with testing of protein C, protein S with antithrombin III.  I will do a prothrombin gene mutation, Factor V Leiden mutation, a lupus anticoagulant and anti-glycoprotein antibodies.  I am going to measure C-reactive protein to see the degree of inflammation associated with her morbid obesity.  I am going to measure as well B12, folic acid level, ferritin, iron, TIBC, reticulated hemoglobin in the background of anemia associated with previous GI bypass for obesity.      I will make her an appointment to come back to the office in 2-3 weeks to review her laboratory parameters and make any recommendations.  As long as she remains obese she will remain on anticoagulants.    In the future when she returns to the office, I am planning as well to proceed with a repeat CT scan of the chest to be sure that the clots are very much resolved and at the same time I will proceed with a CT scan of the abdomen and pelvis to be sure there is no occult malignancy.  The patient is up-to-date with mammogram, pelvic examination and colonoscopy.  There is no family history of malignancy.      Finally, given her persistent tachycardia at rest in spite of being anticoagulated and having the tendency to listen a right-sided S3 gallop, I am going to pursue an echocardiogram to see if there is any repercussion of pulmonary hypertension in the background of her pulmonary embolism.  As I stated, Doppler studies of the lower extremities will be done in the next few minutes.